# Patient Record
Sex: MALE | Race: WHITE | NOT HISPANIC OR LATINO | Employment: FULL TIME | URBAN - METROPOLITAN AREA
[De-identification: names, ages, dates, MRNs, and addresses within clinical notes are randomized per-mention and may not be internally consistent; named-entity substitution may affect disease eponyms.]

---

## 2017-04-14 ENCOUNTER — OFFICE VISIT (OUTPATIENT)
Dept: ENDOCRINOLOGY | Facility: CLINIC | Age: 57
End: 2017-04-14

## 2017-04-14 VITALS
WEIGHT: 188.3 LBS | HEIGHT: 69 IN | DIASTOLIC BLOOD PRESSURE: 76 MMHG | BODY MASS INDEX: 27.89 KG/M2 | HEART RATE: 84 BPM | SYSTOLIC BLOOD PRESSURE: 126 MMHG

## 2017-04-14 DIAGNOSIS — E11.9 TYPE 2 DIABETES MELLITUS WITHOUT COMPLICATION, WITHOUT LONG-TERM CURRENT USE OF INSULIN (H): ICD-10-CM

## 2017-04-14 DIAGNOSIS — M79.641 PAIN OF RIGHT HAND: Primary | ICD-10-CM

## 2017-04-14 LAB — HBA1C MFR BLD: 8.6 % (ref 4.3–6)

## 2017-04-14 NOTE — LETTER
"4/14/2017       RE: Chema Mccullough  [address] [facility] [address]     Dear Colleague,    Thank you for referring your patient, Chema Mccullough, to the [facility] ENDOCRINOLOGY at [facility]. Please see a copy of my visit note below.    #1 Diabetes.  States that he has been pre-diabetic for since 2004 He presented to his PCP who did routine blood work and found his fasting BS to be 184 with an A1c of 7.5.  This was started in the beginning of February 2009. On Metformin XR. In December 2011 it was 7.5.  In January 2013, it was 10.2. March 2013 is 9.3.  In March of 2013, I had the patient increase his metformin XR to 500 mg twice daily.June 2013 hemoglobin A1c of 8.3. January 2014 hemoglobin A1c of 8.2.  July 2014 hemoglobin A1c of 8.8.  With dietary and lifestyle changes, the patient's November 2014 hemoglobin A1c is 7.7.  April 2015 hemoglobin A1c of 8.7.  November 2015 hemoglobin A1c of 8.8.  November 2015 evaluation significant for detectable C-peptide at 3.2.  February 2016 hemoglobin A1c of 8.6.  June 2016 Hemoglobin A1c has remained stable at 8.6 so I increased the patient to metformin 2000 mg daily. August 2016 hemoglobin A1c of 8.8.  Patient now on metformin at 2000 mg daily as of July 2016.  November 2016 hemoglobin A1c of 7.6.      Review of systems related to diabetes  Cardiovascular: .  Currently on Lipitor.  November 2016 LDL was 57.  patient report, he had a stress test in 2016 that was \"clean\"  Ophthalmology: Eye exam negative in May 2015  Neuro: Patient denies  Nephrology: negative in November 2016  Infection: Patient denies  Immunizations: Patient reports flu shot in 2016  Dental: Patient reports up to date    Interval history since last visit:   November 2016 glucose was 194 with a C-peptide of 4.2.  April 2017 hemoglobin A1c of 8.6.    # 2 hyperlipidemia  On Lipitor 40 mg daily.  November 2015 LDL 75. stress test in 2016 that was " "unremarkable      Interval history since last visit: Patient continues on Lipitor    #3. Interest in weight loss   The patient has been working on lifestyle changes.    Interval history since last visit: The patient continues to exercise.  However he reports dietary indiscretion.  He has lost roughly half pound compared with his previous visit in November 2016.    #4 goiter  As noted during his evaluation in June 2016.  He was noted to have a multinodular goiter with several nodules roughly 1 cm in size. October 2016 formal neck ultrasound showed multinodular goiter, with  largest nodules on the right side at 1.1 cm in size (two nodules) and the largest nodule on the left side at 1.2 cm in size.  The patient had declined ultrasound-guided FNA in October 2016.    Interval history: Pt  denies any interim change.    Past medical history  #1 history of smoking-quit    Family history  Very strong family history of diabetes in mother, father, and sister    ROS:  Answers for HPI/ROS submitted by the patient on 4/14/2017   General Symptoms: No  Skin Symptoms: No  HENT Symptoms: No  EYE SYMPTOMS: No  HEART SYMPTOMS: No  LUNG SYMPTOMS: No  INTESTINAL SYMPTOMS: No  URINARY SYMPTOMS: No  REPRODUCTIVE SYMPTOMS: No  SKELETAL SYMPTOMS: No  BLOOD SYMPTOMS: No  NERVOUS SYSTEM SYMPTOMS: No  MENTAL HEALTH SYMPTOMS: No      EXAM:  Blood pressure 126/76, pulse 84, height 1.753 m (5' 9\"), weight 85.4 kg (188 lb 4.8 oz).     GENERAL APPEARANCE: Alert and no distress  NECK: No lymphadenopathy appreciated  Thyroid: Full thyroid appreciated, roughly 30 g.  Eval with floor ultrasound in April 2017 showed the following: Right anterior nodule 1.0 x 0.6 x 0.6 cm in size, right posterior/inferior nodule at 1.0 x 0.5 x 1.0 cm in size, left inferior nodule at 0.6 x 0.5 x 0.7 cm in size.  Comparison with formal ultrasound in 2016, these appear stable not slightly smaller.  CV: RRR without M/R/G  Lungs: CTA bilaterally  Abdomen: Soft, Nontender, non " distended, positive bowel sounds   Neuro:no focal deficits  Skin: No infection in feet   Mood: Normal   Lymph: neg in neck and supraclavicular area        Office Visit on 04/14/2017   Component Date Value Ref Range Status     Hemoglobin A1C 04/14/2017 8.6* 4.3 - 6.0 % Final         Assessment/Plan  #1 diabetes  His hemoglobin A1c has worsened to 8.6.  He continues on metformin at 2000 mg daily.  Given his November 2016 glucose of 194 and C-peptide of 4.2, I'm concerned that he may have a component of beta cell failure.  However, he does continue to make insulin as indicated by the positive C-peptide.      I discussed with him the possibility of Jardiance or another SGLT-2 inhibitor and he is reluctant to consider these options at this time.  He understands that when I see him again in three months, if his  hemoglobin A1c is not improved, we will need to consider these options.    #2 hyperlipidemia  Recheck lipid profile     #3 interest in weight loss  Pt to continue with dietary lifestyle changes.    #4 multinodular goiter  October 2016 neck ultrasound shows a multinodular goiter with the largest nodule roughly 1.1 cm on the right (two nodules at 1.1 cm)  and the largest nodule on the left side at 1.2 cm.  Patient had declined biopsy.    Repeat ultrasound today does not show any interval change in the nodule.  Will continue to observe for now.  We'll consider formal ultrasound maybe 1-2 years.    #5 right thumb pain  Suspect this is related to overuse.  However, this interferes with his ability to dress himself.  We'll have the patient take Naprosyn 375 mg daily and consider orthopedic evaluation.  He can also consider a wrist splint at the right hand at night.    Return visit planned in 4 months.    25 minutes spent with patient of which 20 minutes was spent in face-to-face discussion coordination of care    Again, thank you for allowing me to participate in the care of your patient.      Sincerely,    Cheri Urena  MD Walter

## 2017-04-14 NOTE — PROGRESS NOTES
"#1 Diabetes.  States that he has been pre-diabetic for since 2004 He presented to his PCP who did routine blood work and found his fasting BS to be 184 with an A1c of 7.5.  This was started in the beginning of February 2009. On Metformin XR. In December 2011 it was 7.5.  In January 2013, it was 10.2. March 2013 is 9.3.  In March of 2013, I had the patient increase his metformin XR to 500 mg twice daily.June 2013 hemoglobin A1c of 8.3. January 2014 hemoglobin A1c of 8.2.  July 2014 hemoglobin A1c of 8.8.  With dietary and lifestyle changes, the patient's November 2014 hemoglobin A1c is 7.7.  April 2015 hemoglobin A1c of 8.7.  November 2015 hemoglobin A1c of 8.8.  November 2015 evaluation significant for detectable C-peptide at 3.2.  February 2016 hemoglobin A1c of 8.6.  June 2016 Hemoglobin A1c has remained stable at 8.6 so I increased the patient to metformin 2000 mg daily. August 2016 hemoglobin A1c of 8.8.  Patient now on metformin at 2000 mg daily as of July 2016.  November 2016 hemoglobin A1c of 7.6.      Review of systems related to diabetes  Cardiovascular: .  Currently on Lipitor.  November 2016 LDL was 57.  patient report, he had a stress test in 2016 that was \"clean\"  Ophthalmology: Eye exam negative in May 2015  Neuro: Patient denies  Nephrology: negative in November 2016  Infection: Patient denies  Immunizations: Patient reports flu shot in 2016  Dental: Patient reports up to date    Interval history since last visit:   November 2016 glucose was 194 with a C-peptide of 4.2.  April 2017 hemoglobin A1c of 8.6.    # 2 hyperlipidemia  On Lipitor 40 mg daily.  November 2015 LDL 75. stress test in 2016 that was unremarkable      Interval history since last visit: Patient continues on Lipitor    #3. Interest in weight loss   The patient has been working on lifestyle changes.    Interval history since last visit: The patient continues to exercise.  However he reports dietary indiscretion.  He has lost roughly " "half pound compared with his previous visit in November 2016.    #4 goiter  As noted during his evaluation in June 2016.  He was noted to have a multinodular goiter with several nodules roughly 1 cm in size. October 2016 formal neck ultrasound showed multinodular goiter, with  largest nodules on the right side at 1.1 cm in size (two nodules) and the largest nodule on the left side at 1.2 cm in size.  The patient had declined ultrasound-guided FNA in October 2016.    Interval history: Pt  denies any interim change.    Past medical history  #1 history of smoking-quit    Family history  Very strong family history of diabetes in mother, father, and sister    ROS:  Answers for HPI/ROS submitted by the patient on 4/14/2017   General Symptoms: No  Skin Symptoms: No  HENT Symptoms: No  EYE SYMPTOMS: No  HEART SYMPTOMS: No  LUNG SYMPTOMS: No  INTESTINAL SYMPTOMS: No  URINARY SYMPTOMS: No  REPRODUCTIVE SYMPTOMS: No  SKELETAL SYMPTOMS: No  BLOOD SYMPTOMS: No  NERVOUS SYSTEM SYMPTOMS: No  MENTAL HEALTH SYMPTOMS: No      EXAM:  Blood pressure 126/76, pulse 84, height 1.753 m (5' 9\"), weight 85.4 kg (188 lb 4.8 oz).     GENERAL APPEARANCE: Alert and no distress  NECK: No lymphadenopathy appreciated  Thyroid: Full thyroid appreciated, roughly 30 g.  Eval with floor ultrasound in April 2017 showed the following: Right anterior nodule 1.0 x 0.6 x 0.6 cm in size, right posterior/inferior nodule at 1.0 x 0.5 x 1.0 cm in size, left inferior nodule at 0.6 x 0.5 x 0.7 cm in size.  Comparison with formal ultrasound in 2016, these appear stable not slightly smaller.  CV: RRR without M/R/G  Lungs: CTA bilaterally  Abdomen: Soft, Nontender, non distended, positive bowel sounds   Neuro:no focal deficits  Skin: No infection in feet   Mood: Normal   Lymph: neg in neck and supraclavicular area        Office Visit on 04/14/2017   Component Date Value Ref Range Status     Hemoglobin A1C 04/14/2017 8.6* 4.3 - 6.0 % Final         Assessment/Plan  #1 " diabetes  His hemoglobin A1c has worsened to 8.6.  He continues on metformin at 2000 mg daily.  Given his November 2016 glucose of 194 and C-peptide of 4.2, I'm concerned that he may have a component of beta cell failure.  However, he does continue to make insulin as indicated by the positive C-peptide.      I discussed with him the possibility of Jardiance or another SGLT-2 inhibitor and he is reluctant to consider these options at this time.  He understands that when I see him again in three months, if his  hemoglobin A1c is not improved, we will need to consider these options.    #2 hyperlipidemia  Recheck lipid profile     #3 interest in weight loss  Pt to continue with dietary lifestyle changes.    #4 multinodular goiter  October 2016 neck ultrasound shows a multinodular goiter with the largest nodule roughly 1.1 cm on the right (two nodules at 1.1 cm)  and the largest nodule on the left side at 1.2 cm.  Patient had declined biopsy.    Repeat ultrasound today does not show any interval change in the nodule.  Will continue to observe for now.  We'll consider formal ultrasound maybe 1-2 years.    #5 right thumb pain  Suspect this is related to overuse.  However, this interferes with his ability to dress himself.  We'll have the patient take Naprosyn 375 mg daily and consider orthopedic evaluation.  He can also consider a wrist splint at the right hand at night.    Return visit planned in 4 months.    25 minutes spent with patient of which 20 minutes was spent in face-to-face discussion coordination of care

## 2017-04-14 NOTE — MR AVS SNAPSHOT
After Visit Summary   4/14/2017    Chema Mccullough    MRN: [id number]           Patient Information     Date Of Birth          1960        Visit Information        Provider Department      4/14/2017 10:30 AM Cheri Watson MD [facility]        Today's Diagnoses     Pain of right hand    -  1      Care Instructions    Eat less carbs    Don't eat after  8 pm     Watch out for drinking the calories    Wrist splint to the right hand    Take naprosyn 375 in the evening can stop after 2 weeks then use as needed            Follow-ups after your visit        Additional Services     ORTHO  REFERRAL       Pt to see hand specialist for right hand pain                  Follow-up notes from your care team     Return in about 3 months (around 7/14/2017).      Your next 10 appointments already scheduled     May 09, 2017  1:00 PM CDT   (Arrive by 12:45 PM)   NEW HAND with Camila Baeza MD   [facility] ([facility])    [address] [place] [address]  [address] [facility] [address] [phone]            Jul 21, 2017  3:00 PM CDT   (Arrive by 2:45 PM)   RETURN DIABETES with Cheri Watson MD   [facility] ([facility])    [address] [place] [address]  [address] [facility] [address] [phone]              Who to contact     Please call your clinic at [phone] to:    Ask questions about your health    Make or cancel appointments    Discuss your medicines    Learn about your test results    Speak to your doctor   If you have compliments or concerns about an experience at your clinic, or if you wish to file a complaint, please contact [facility] Patient Relations at [phone] or email us at [email or web address]         Additional Information About Your Visit        MyChart Information     [email or web address] gives you secure access to your electronic health record. If you see  "a primary care provider, you can also send messages to your care team and make appointments. If you have questions, please call your primary care clinic.  If you do not have a primary care provider, please call [phone] and they will assist you.      [employer] is an electronic gateway that provides easy, online access to your medical records. With [employer], you can request a clinic appointment, read your test results, renew a prescription or communicate with your care team.     To access your existing account, please contact your [facility] or call [phone] for assistance.        Care EveryWhere ID     This is your Care EveryWhere ID. This could be used by other organizations to access your [facility] medical records  [id number]        Your Vitals Were     Pulse Height BMI (Body Mass Index)             84 1.753 m (5' 9\") 27.81 kg/m2          Blood Pressure from Last 3 Encounters:   04/14/17 126/76   11/18/16 109/72   08/19/16 122/67    Weight from Last 3 Encounters:   04/14/17 85.4 kg (188 lb 4.8 oz)   11/18/16 85.7 kg (189 lb)   08/19/16 87.1 kg (192 lb)              We Performed the Following     ORTHO  REFERRAL          Today's Medication Changes          These changes are accurate as of: 4/14/17 11:33 AM.  If you have any questions, ask your nurse or doctor.               Start taking these medicines.        Dose/Directions    naproxen 375 MG tablet   Commonly known as:  NAPROSYN   Used for:  Pain of right hand        1 tablet daily with supper   Quantity:  30 tablet   Refills:  0            Where to get your medicines      These medications were sent to [facility] [address] [place] [facility] [id number] [place] [address]  [id number] [place] [address]     Phone:  [phone]     naproxen 375 MG tablet                Primary Care Provider Office Phone # Fax #    Sharla Wu [phone]       [address]  [facility] [address]        Thank you!     Thank you for choosing [facility] ENDOCRINOLOGY  for your care. Our goal is always to provide you with excellent care. Hearing back from our patients is one way we can continue to improve our services. Please take a few minutes to complete the written survey that you may receive in the mail after your visit with us. Thank you!             Your Updated Medication List - Protect others around you: Learn how to safely use, store and throw away your medicines at www.disposemymeds.org.          This list is accurate as of: 4/14/17 11:33 AM.  Always use your most recent med list.                   Brand Name Dispense Instructions for use    aspirin 81 MG tablet     90 tablet    Take 1 tablet (81 mg) by mouth daily       atorvastatin 40 MG tablet    LIPITOR    90 tablet    Take 1 tablet (40 mg) by mouth daily Take one daily       metFORMIN 1000 MG tablet    GLUCOPHAGE    180 tablet    Take 1 tablet (1,000 mg) by mouth 2 times daily (with meals)       naproxen 375 MG tablet    NAPROSYN    30 tablet    1 tablet daily with supper       OMEGA 3 PO      Take by mouth daily

## 2017-04-14 NOTE — PATIENT INSTRUCTIONS
Eat less carbs    Don't eat after  8 pm     Watch out for drinking the calories    Wrist splint to the right hand    Take naprosyn 375 in the evening can stop after 2 weeks then use as needed

## 2017-04-18 ENCOUNTER — PRE VISIT (OUTPATIENT)
Dept: ORTHOPEDICS | Facility: CLINIC | Age: 57
End: 2017-04-18

## 2017-04-18 NOTE — TELEPHONE ENCOUNTER
1.  Date/reason for appt: 5/9/17 - Rt Hand Pain  2.  Referring provider: Dr. Cheri Watson  3.  Call to patient (Yes / No - short description): no, pt is referred  4.  Previous care at / records requested from: [facility] -- Records and referral in Epic

## 2017-05-02 DIAGNOSIS — M79.641 PAIN OF RIGHT HAND: Primary | ICD-10-CM

## 2017-05-09 ENCOUNTER — OFFICE VISIT (OUTPATIENT)
Dept: ORTHOPEDICS | Facility: CLINIC | Age: 57
End: 2017-05-09

## 2017-05-09 ENCOUNTER — THERAPY VISIT (OUTPATIENT)
Dept: OCCUPATIONAL THERAPY | Facility: CLINIC | Age: 57
End: 2017-05-09
Payer: COMMERCIAL

## 2017-05-09 VITALS — BODY MASS INDEX: 28.29 KG/M2 | WEIGHT: 191 LBS | HEIGHT: 69 IN

## 2017-05-09 DIAGNOSIS — M65.4 RADIAL STYLOID TENOSYNOVITIS: Primary | ICD-10-CM

## 2017-05-09 PROCEDURE — 29130 APPL FINGER SPLINT STATIC: CPT | Mod: GO | Performed by: OCCUPATIONAL THERAPIST

## 2017-05-09 PROCEDURE — 97165 OT EVAL LOW COMPLEX 30 MIN: CPT | Mod: GO | Performed by: OCCUPATIONAL THERAPIST

## 2017-05-09 NOTE — Clinical Note
5/9/2017       RE: Chema Mccullough  [address] [facility] [address]     Dear Colleague,    Thank you for referring your patient, Chema Mccullough, to the [facility] ORTHOPAEDIC CLINIC at [facility]. Please see a copy of my visit note below.    No notes on file    Again, thank you for allowing me to participate in the care of your patient.      Sincerely,    Camila Baeza MD

## 2017-05-09 NOTE — PROGRESS NOTES
Hand Therapy Initial Evaluation    Current Date:  5/9/2017    Diagnosis: R deQervain's Tenosynovitis  DOI:  October '16  Procedure:  none    Precautions: NA    Subjective:  Chema Mccullough is a 57 year old right hand dominant male.    Patient reports symptoms of pain of the right wrist and thumb which occurred due to not sure. Since onset symptoms are improved after injection  Special tests:  x-ray.  Previous treatment: cortisone injection.    General health as reported by patient is good.  Pertinent medical history includes:none  Medical allergies:none.  Surgical history: none.  Medication history: none.    Current occupation is Electric    Currently working in normal job without restrictions  Job Tasks: prolonged sitting, computer work  Barriers include:none  Prior functional level:  no limitations    Additional Occupational Profile Information (patterns of daily living, interests, values and needs): biking    Objective:  Pain Level Report  VAS(0-10) 5/9/2017   At Rest: 3/10   With Use: 7/10     Report of Pain:  Location:  wrist and thumb (radial side)  Pain Quality:  Burning, Dull and Sharp  Frequency: constant    Pain is worst:  daytime  Exacerbated by:  movement  Relieved by:  rest  Progression:  Improved after injection  ROM:  Pain Report:  - none    + mild    ++ moderate    +++ severe    5/9/2017   Thumb AROM  PROM rigt    MP -15/64    IP +/37    Rabd 70++    Pabd 67++   Wrist Ext 65    Flex 66    RD 10    UD 30    Sup 67+    Pro 90     Pain level report on scale of 0-10/10  Resisted Testing 5/9/2017   APL *   EPB *   RD *   UD *   Wrist Ext *   Wrist Flex *   *NOT TESTED, SECONDARY TO INJECTION TODAY  Strength:  Contraindicated, due to injection    Pain Report:  - none    + mild    ++ moderate    +++ severe   Tenderness 5/9/2017   Radial Styloid    SBRN      Special Tests 5/9/2017   Finkelsteins *   Radial Nerve Tinel's *   *NOT TESTED, SECONDARY TO INJECTION  TODAY    Assessment:  Patient presents with symptoms consistent with diagnosis of DeQuervains tenosynovitis, with conservative intervention.   Patient's limitations or Problem List includes:  Pain, Decreased ROM/motion, Weakness, Decreased  and pinch strength and tightness in musculature of the right wrist which interferes with the patient's ability to perform Self Care Tasks (dressing, eating, bathing, hygiene/toileting), Work Tasks, Sleep Patterns, Recreational Activities, Household Chores and Driving  as compared to previous level of function.    Rehab Potential:  Excellent - Return to full activity, no limitations    Patient will benefit from skilled Occupational Therapy to increase wrist and thumb ROM, flexibility,  strength and pinch strength and decrease pain to return to previous activity level and resume normal daily tasks and to reach their rehab potential.    Barriers to Learning:  No barrier    Communication Issues:  Patient appears to be able to clearly communicate and understand verbal and written communication and follow directions correctly.    Assessment of Occupational Performance:  1-3 Performance Deficits  Identified Performance Deficits: work and leisure activities      Clinical Decision Making (Complexity): Low complexity    Treatment Explanation:  The following has been discussed with the patient:    RX ordered/plan of care  Anticipated outcomes  Possible risks and side effects    Treatment Plan:    Frequency:  2 X a month, once daily  Duration:  for 2 months    Therapeutic Exercise:   AROM of wrist and thumb  PROM with stretch to wrist and thumb extensors   Strengthening  Manual Techniques:   Joint mobs  Friction massage over 1st dorsal compartment  MFR  Neuromuscular Reeducation   Nerve Gliding and Kinesiotaping  Orthotic Fabrication:  Forearm based thumb spica orthosis  Activity:   Avoid activities that exacerbate pain in the wrist or thumb.    Avoid  with twist, wide  grasp.    Discharge Plan:    Achieve all LTG.  Independent in home treatment program.  Reach maximal therapeutic benefit.    Home Exercise Program:  [place] orthosis, prn  Limiting radial and ulnar deviation    Next Visit:  PRN

## 2017-05-09 NOTE — MR AVS SNAPSHOT
After Visit Summary   5/9/2017    Chema Mccullough    MRN: [id number]           Patient Information     Date Of Birth          1960        Visit Information        Provider Department      5/9/2017 1:00 PM Camila Baeza MD [facility]        Today's Diagnoses     Radial styloid tenosynovitis    -  1       Follow-ups after your visit        Additional Services     HAND THERAPY       Hand Therapy Referral                  Your next 10 appointments already scheduled     Aug 11, 2017 11:30 AM CDT   (Arrive by 11:15 AM)   RETURN DIABETES with Cheri Watson MD   [facility] ([facility])    [address] [place] [id number] [phone]              Who to contact     Please call your clinic at [phone] to:    Ask questions about your health    Make or cancel appointments    Discuss your medicines    Learn about your test results    Speak to your doctor   If you have compliments or concerns about an experience at your clinic, or if you wish to file a complaint, please contact [facility] Patient Relations at [phone] or email us at [email or web address]         Additional Information About Your Visit        MyChart Information     [employer] gives you secure access to your electronic health record. If you see a primary care provider, you can also send messages to your care team and make appointments. If you have questions, please call your primary care clinic.  If you do not have a primary care provider, please call [phone] and they will assist you.      [employer] is an electronic gateway that provides easy, online access to your medical records. With [employer], you can request a clinic appointment, read your test results, renew a prescription or communicate with your care team.     To access your existing account, please contact your [facility]  "Clinic or call [phone] for assistance.        Care EveryWhere ID     This is your Care EveryWhere ID. This could be used by other organizations to access your [place] medical records  [id number]        Your Vitals Were     Height BMI (Body Mass Index)                1.753 m (5' 9\") 28.21 kg/m2           Blood Pressure from Last 3 Encounters:   04/14/17 126/76   11/18/16 109/72   08/19/16 122/67    Weight from Last 3 Encounters:   05/09/17 86.6 kg (191 lb)   04/14/17 85.4 kg (188 lb 4.8 oz)   11/18/16 85.7 kg (189 lb)              We Performed the Following     HAND THERAPY     INJECTION SINGLE TENDON SHEATH/LIGAMENT        Primary Care Provider Office Phone # Fax #    Sharla Wu [phone]       [place] [address]        Equal Access to Services     [facility] : Hadii aad ku hadasho Soomaali, waaxda luqadaha, qaybta kaalmada adeegyada, waxay idiin hayreymundon kofi mckenzie . So [facility] [phone].    ATENCIÓN: Si josh temple, tiene a castro disposición servicios gratuitos de asistencia lingüística. Llame al [phone].    We comply with applicable federal civil rights laws and [place] laws. We do not discriminate on the basis of race, color, national origin, age, disability sex, sexual orientation or gender identity.            Thank you!     Thank you for choosing [facility]  for your care. Our goal is always to provide you with excellent care. Hearing back from our patients is one way we can continue to improve our services. Please take a few minutes to complete the written survey that you may receive in the mail after your visit with us. Thank you!             Your Updated Medication List - Protect others around you: Learn how to safely use, store and throw away your medicines at www.disposemymeds.org.          This list is accurate as of: 5/9/17 11:59 PM.  Always use your most recent med list.          "          Brand Name Dispense Instructions for use Diagnosis    aspirin 81 MG tablet     90 tablet    Take 1 tablet (81 mg) by mouth daily    Type 2 diabetes mellitus without complication, without long-term current use of insulin (H)       atorvastatin 40 MG tablet    LIPITOR    90 tablet    Take 1 tablet (40 mg) by mouth daily Take one daily    Type 2 diabetes mellitus without complication, without long-term current use of insulin (H)       metFORMIN 1000 MG tablet    GLUCOPHAGE    180 tablet    Take 1 tablet (1,000 mg) by mouth 2 times daily (with meals)    Type 2 diabetes mellitus without complication, without long-term current use of insulin (H)       naproxen 375 MG tablet    NAPROSYN    30 tablet    1 tablet daily with supper    Pain of right hand       OMEGA 3 PO      Take by mouth daily

## 2017-05-09 NOTE — NURSING NOTE
[facility]  [address] [place] [id number]  Dept: [phone]  ______________________________________________________________________________    Patient: Chema Mccullough   : 1960   MRN: [id number]   May 9, 2017    INVASIVE PROCEDURE SAFETY CHECKLIST    Date: 2017   Procedure: Right first dorsal compartment steroid injection  Patient Name: Chema Mccullough  MRN: [id number]  YOB: 1960    Action: Complete sections as appropriate. Any discrepancy results in a HARD COPY until resolved.     PRE PROCEDURE:  Patient ID verified with 2 identifiers (name and  or MRN): Yes  Procedure and site verified with patient/designee (when able): Yes  Accurate consent documentation in medical record: Yes  H&P (or appropriate assessment) documented in medical record: Yes  H&P must be up to 20 days prior to procedure and updates within 24 hours of procedure as applicable: Yes  Relevant diagnostic and radiology test results appropriately labeled and displayed as applicable: Yes  Procedure site(s) marked with provider initials: Yes    TIMEOUT:  Time-Out performed immediately prior to starting procedure, including verbal and active participation of all team members addressing the following:Yes  * Correct patient identify  * Confirmed that the correct side and site are marked  * An accurate procedure consent form  * Agreement on the procedure to be done  * Correct patient position  * Relevant images and results are properly labeled and appropriately displayed  * The need to administer antibiotics or fluids for irrigation purposes during the procedure as applicable   * Safety precautions based on patient history or medication use    DURING PROCEDURE: Verification of correct person, site, and procedures any time the responsibility for care of the patient is transferred to another member of the care team.

## 2017-05-09 NOTE — MR AVS SNAPSHOT
After Visit Summary   5/9/2017    Chema Mccullough    MRN: [id number]           Patient Information     Date Of Birth          1960        Visit Information        Provider Department      5/9/2017 2:00 PM Lola Patel OT [facility] Hand Therapy        Today's Diagnoses     Radial styloid tenosynovitis    -  1       Follow-ups after your visit        Your next 10 appointments already scheduled     Aug 11, 2017 11:30 AM CDT   (Arrive by 11:15 AM)   RETURN DIABETES with Cheri Watson MD   [facility] ([facility])    [address] [place] [id number] [phone]              Who to contact     If you have questions or need follow up information about today's clinic visit or your schedule please contact [facility] HAND [facility] directly at [phone].  Normal or non-critical lab and imaging results will be communicated to you by MyChart, letter or phone within 4 business days after the clinic has received the results. If you do not hear from us within 7 days, please contact the clinic through [email or web address] or phone. If you have a critical or abnormal lab result, we will notify you by phone as soon as possible.  Submit refill requests through [employer] or call your pharmacy and they will forward the refill request to us. Please allow 3 business days for your refill to be completed.          Additional Information About Your Visit        MyChart Information     [employer] gives you secure access to your electronic health record. If you see a primary care provider, you can also send messages to your care team and make appointments. If you have questions, please call your primary care clinic.  If you do not have a primary care provider, please call [phone] and they will assist you.        Care EveryWhere ID     This is your Care EveryWhere ID. This could be used by other organizations to access your [facility]  records  [id number]         Blood Pressure from Last 3 Encounters:   04/14/17 126/76   11/18/16 109/72   08/19/16 122/67    Weight from Last 3 Encounters:   05/09/17 86.6 kg (191 lb)   04/14/17 85.4 kg (188 lb 4.8 oz)   11/18/16 85.7 kg (189 lb)              We Performed the Following     APPLY FINGER SPLINT STATIC     HC OT EVAL, LOW COMPLEXITY        Primary Care Provider Office Phone # Fax #    Sharla Wu [phone]       [place] [address]        Thank you!     Thank you for choosing [facility]  for your care. Our goal is always to provide you with excellent care. Hearing back from our patients is one way we can continue to improve our services. Please take a few minutes to complete the written survey that you may receive in the mail after your visit with us. Thank you!             Your Updated Medication List - Protect others around you: Learn how to safely use, store and throw away your medicines at www.disposemymeds.org.          This list is accurate as of: 5/9/17  8:07 PM.  Always use your most recent med list.                   Brand Name Dispense Instructions for use    aspirin 81 MG tablet     90 tablet    Take 1 tablet (81 mg) by mouth daily       atorvastatin 40 MG tablet    LIPITOR    90 tablet    Take 1 tablet (40 mg) by mouth daily Take one daily       metFORMIN 1000 MG tablet    GLUCOPHAGE    180 tablet    Take 1 tablet (1,000 mg) by mouth 2 times daily (with meals)       naproxen 375 MG tablet    NAPROSYN    30 tablet    1 tablet daily with supper       OMEGA 3 PO      Take by mouth daily

## 2017-05-09 NOTE — PROGRESS NOTES
[facility], Orthopaedic Hand Surgery    Chema Mccullough MRN# [id number]   Age: 57 year old YOB: 1960     Requesting physician: Cheri Watson   Primary care physician: Sharla Wu             History of Present Illness:   Chema Mccullough is a 57 year old year old male who presents today for evaluation and management of R base of thumb pain.     Pain Assessment  Patient Currently in Pain: Yes  0-10 Pain Scale: 3  Primary Pain Location: Finger (Comment which one)  Pain Orientation: Right  Pain Descriptors: Discomfort, Sore  Alleviating Factors: Rest  Aggravating Factors: Movement    Hand Dominance Evaluation  Hand Dominance: Right     The pain localizes to the R thumb base, moreso over 1st dorsal compartment.    The pain has been present for approximately 6mo.    There was not inciting event or trauma.    The pain has been Worsening over the more recent history.  Exacerbated by buttons and self cares. Operating mouse and computer worsen at the end of the day.    Thus far, the patient has tried NSAIDs and offloading brace.  With min relief of symptoms.             Past Medical History:     Patient Active Problem List   Diagnosis     Diabetes mellitus (H)     Goiter     Pain of right hand     Past Medical History:   Diagnosis Date     Diabetes (H)      Prior history of blood clot: none  Prior history of bleeding problems: none  Prior history of anesthetic complications: none  Currently on opioids:  none  History of Diabetes: type 2 DM, no insulin           Past Surgical History:   No past surgical history on file.         Social History:     Social History     Social History     Marital status:      Spouse name: N/A     Number of children: N/A     Years of education: N/A     Occupational History     Not on file.     Social History Main Topics     Smoking status: Former Smoker     Packs/day: 1.00     Types: Cigarettes, Cigars     Start date: 10/10/1978     Quit date: 1/1/2011  "    Smokeless tobacco: Former User     Alcohol use No     Drug use: No     Sexual activity: Yes     Partners: Female     Other Topics Concern     Not on file     Social History Narrative     Professor of electrical engineering  The patient lives locally with wife.         Family History:       Family History   Problem Relation Age of Onset     DIABETES Mother      DIABETES Father      CANCER No family hx of      no skin cancer     Glaucoma No family hx of      Macular Degeneration No family hx of               Medications:     Current Outpatient Prescriptions   Medication Sig     Omega-3 Fatty Acids (OMEGA 3 PO) Take by mouth daily     naproxen (NAPROSYN) 375 MG tablet 1 tablet daily with supper     metFORMIN (GLUCOPHAGE) 1000 MG tablet Take 1 tablet (1,000 mg) by mouth 2 times daily (with meals)     aspirin 81 MG tablet Take 1 tablet (81 mg) by mouth daily     atorvastatin (LIPITOR) 40 MG tablet Take 1 tablet (40 mg) by mouth daily Take one daily     No current facility-administered medications for this visit.             Review of Systems:   A comprehensive 10 point review of systems (constitutional, ENT, cardiac, peripheral vascular, lymphatic, respiratory, GI, , Musculoskeletal, skin, Neurological) was performed and documented in the intake form and at the end of this note      Answers for HPI/ROS submitted by the patient on 5/9/2017   General Symptoms: No  Skin Symptoms: No  HENT Symptoms: No  EYE SYMPTOMS: No  HEART SYMPTOMS: No  LUNG SYMPTOMS: No  INTESTINAL SYMPTOMS: No  URINARY SYMPTOMS: No  REPRODUCTIVE SYMPTOMS: No  SKELETAL SYMPTOMS: No  BLOOD SYMPTOMS: No  NERVOUS SYSTEM SYMPTOMS: No  MENTAL HEALTH SYMPTOMS: No             Physical Exam:     EXAMINATION pertinent findings:   VITAL SIGNS: Height 1.753 m (5' 9\"), weight 86.6 kg (191 lb).  Body mass index is 28.21 kg/(m^2).  GEN: AOx3, appears stated age, cooperative, no distress  HEENT: normocephalic, atraumatic, extraocular movements intact  RESP: non " labored breathing   SKIN: No rashes or open lesions   CV: Non-tachycardic   NEURO: CN2-12 grossly intact   VASCULAR: palpable radial pulse   MUSCULOSKELETAL:       RUE exam:   Tenderness to palpation: 1st dorsal compartment  Warmth and erythema: no  Prior incision: no  Sensation: intact in Med/uln/rad distributions  Motor: Fires EPL/FPL/AIN/IO  Deformity: no  Able to sublux CMC without pain.   + finkelstiens on R         Data:   Imaging:     XR of 3v R hand reviewed and the pertinent findings are as follows:   No degenerative change at CMC notable.     Pertinent Labs:      force  R hand pincher force: 5.443 kg (12 lb)  R hand  level 1 force: 29.5 kg (65 lb)  R hand  level 3 force: 45.4 kg (100 lb)  R hand  level 5 force: 36.3 kg (80 lb)  L hand pincher force: 6.804 kg (15 lb)  L hand   level 1 force: 34 kg (75 lb)  L hand  level 3 force: 45.4 kg (100 lb)  L hand  level 5 force: 36.3 kg (80 lb)      Lab Results   Component Value Date    A1C 8.3 06/07/2013    A1C 7.5 12/23/2011       QuickDASH Assessment  QuickDASH Main 5/9/2017   1.Open a tight or new jar. Moderate difficulty   2. Do heavy household chores (e.g., wash walls, floors) No difficulty   3. Carry a shopping bag or briefcase. No difficulty   4. Wash your back. Severe difficulty   5. Use a knife to cut food. Mild difficulty   6. Recreational activities in which you take some force or impact through your arm, shoulder or hand (e.g., golf, hammering, tennis, etc.). Mild difficulty   7. During the past week, to what extent has your arm, shoulder or hand problem interfered with your normal social activities with family, friends, neighbours or groups? Slightly   8. During the past week, were you limited in your work or other regular daily activities as a result of your arm, shoulder or hand problem? Slightly limited   9. Arm, shoulder or hand pain. Moderate   10.Tingling (pins and needles) in your arm,shoulder or hand. None   11.  During the past week, how much difficulty have you had sleeping because of the pain in your arm, shoulder or hand? ([place] number) Mild difficulty   Quickdash Ability Score 27.27               Assessment and Plan:   Assessment:  1. R [place] tenosynovitis     Plan:  1. R 1st dorsal compartment CSI  2. Thumb spica splint, hand therapy ordered    RTC PRN    After written informed consent was obtained, the patient's right 1st dorsal compartment was prepped with chloraprep.  A combination of 20 mg of Depo-Medrol and 1cc 1% lidocaine were injected into the 1st dorsal compartment through a retrograde approach.  There were no immediate complications.  Band-aid was applied.      The patient plan of care was formulated along with Dr. Felisha Gasca MD  Orthopedic Resident  05/09/2017    I have independently seen and evaluated the patient and agree with the findings and plan of care as documented by the resident and edited by me.

## 2017-05-09 NOTE — NURSING NOTE
"Reason For Visit:   Chief Complaint   Patient presents with     Consult     Right hand thumb pain. Started in october/ November and heard cracking in joint. Referred by Dr. Watson in Endocrinology       Primary MD: Sharla Wu  Ref. MD: Dr. Watson    Age: 57 year old    ?  No      Ht 1.753 m (5' 9\")  Wt 86.6 kg (191 lb)  BMI 28.21 kg/m2      Pain Assessment  Patient Currently in Pain: Yes  0-10 Pain Scale: 3  Primary Pain Location: Finger (Comment which one)  Pain Orientation: Right  Pain Descriptors: Discomfort, Sore  Alleviating Factors: Rest  Aggravating Factors: Movement    Hand Dominance Evaluation  Hand Dominance: Right      force  R hand pincher force: 5.443 kg (12 lb)  R hand  level 1 force: 29.5 kg (65 lb)  R hand  level 3 force: 45.4 kg (100 lb)  R hand  level 5 force: 36.3 kg (80 lb)  L hand pincher force: 6.804 kg (15 lb)  L hand   level 1 force: 34 kg (75 lb)  L hand  level 3 force: 45.4 kg (100 lb)  L hand  level 5 force: 36.3 kg (80 lb)    QuickDASH Assessment  QuickDASH Main 5/9/2017   1.Open a tight or new jar. Moderate difficulty   2. Do heavy household chores (e.g., wash walls, floors) No difficulty   3. Carry a shopping bag or briefcase. No difficulty   4. Wash your back. Severe difficulty   5. Use a knife to cut food. Mild difficulty   6. Recreational activities in which you take some force or impact through your arm, shoulder or hand (e.g., golf, hammering, tennis, etc.). Mild difficulty   7. During the past week, to what extent has your arm, shoulder or hand problem interfered with your normal social activities with family, friends, neighbours or groups? Slightly   8. During the past week, were you limited in your work or other regular daily activities as a result of your arm, shoulder or hand problem? Slightly limited   9. Arm, shoulder or hand pain. Moderate   10.Tingling (pins and needles) in your arm,shoulder or hand. None   11. During the " past week, how much difficulty have you had sleeping because of the pain in your arm, shoulder or hand? ([place] number) Mild difficulty   Quickdash Ability Score 27.27          Current Outpatient Prescriptions   Medication Sig Dispense Refill     Omega-3 Fatty Acids (OMEGA 3 PO) Take by mouth daily       naproxen (NAPROSYN) 375 MG tablet 1 tablet daily with supper 30 tablet 0     metFORMIN (GLUCOPHAGE) 1000 MG tablet Take 1 tablet (1,000 mg) by mouth 2 times daily (with meals) 180 tablet 3     aspirin 81 MG tablet Take 1 tablet (81 mg) by mouth daily 90 tablet 3     atorvastatin (LIPITOR) 40 MG tablet Take 1 tablet (40 mg) by mouth daily Take one daily 90 tablet 3       No Known Allergies

## 2017-06-21 RX ORDER — METHYLPREDNISOLONE ACETATE 40 MG/ML
40 INJECTION, SUSPENSION INTRA-ARTICULAR; INTRALESIONAL; INTRAMUSCULAR; SOFT TISSUE ONCE
Status: DISCONTINUED | OUTPATIENT
Start: 2017-06-21 | End: 2019-08-16

## 2017-06-21 RX ORDER — LIDOCAINE HYDROCHLORIDE 10 MG/ML
2 INJECTION, SOLUTION INFILTRATION; PERINEURAL ONCE
Status: ACTIVE | OUTPATIENT
Start: 2017-06-21

## 2017-08-11 ENCOUNTER — OFFICE VISIT (OUTPATIENT)
Dept: ENDOCRINOLOGY | Facility: CLINIC | Age: 57
End: 2017-08-11

## 2017-08-11 VITALS
HEIGHT: 69 IN | WEIGHT: 191.2 LBS | SYSTOLIC BLOOD PRESSURE: 110 MMHG | DIASTOLIC BLOOD PRESSURE: 73 MMHG | BODY MASS INDEX: 28.32 KG/M2

## 2017-08-11 DIAGNOSIS — E11.9 TYPE 2 DIABETES MELLITUS WITHOUT COMPLICATION, WITHOUT LONG-TERM CURRENT USE OF INSULIN (H): Primary | ICD-10-CM

## 2017-08-11 LAB — HBA1C MFR BLD: 8.6 % (ref 4.3–6)

## 2017-08-11 ASSESSMENT — PAIN SCALES - GENERAL: PAINLEVEL: NO PAIN (0)

## 2017-08-11 NOTE — MR AVS SNAPSHOT
After Visit Summary   8/11/2017    Chema Mccullough    MRN: [id number]           Patient Information     Date Of Birth          1960        Visit Information        Provider Department      8/11/2017 11:30 AM Cheri Watson MD [facility]        Care Instructions    Try concept 2 with goal of about 600 calories/hr - more leg than arm - make sure technique is ok          Follow-ups after your visit        Follow-up notes from your care team     Return in about 3 months (around 11/11/2017).      Your next 10 appointments already scheduled     Aug 23, 2017 10:00 AM CDT   Nurse Visit with  Med Nurse   [facility] ([facility])    [address]  3rd [address]  [facility] [address] [phone]            Dec 01, 2017 10:00 AM CST   (Arrive by 9:45 AM)   RETURN DIABETES with Cheri Watson MD   [facility] ([facility])    [address]  [address] [facility] [address] [phone]              Who to contact     Please call your clinic at [phone] to:    Ask questions about your health    Make or cancel appointments    Discuss your medicines    Learn about your test results    Speak to your doctor   If you have compliments or concerns about an experience at your clinic, or if you wish to file a complaint, please contact [facility] Patient Relations at [phone] or email us at [email or web address]         Additional Information About Your Visit        [employer] Information     [employer] gives you secure access to your electronic health record. If you see a primary care provider, you can also send messages to your care team and make appointments. If you have questions, please call your primary care clinic.  If you do not have a primary care provider, please call [phone] and they will assist you.      GoodPeople is an electronic gateway that provides  "easy, online access to your medical records. With ithinksport, you can request a clinic appointment, read your test results, renew a prescription or communicate with your care team.     To access your existing account, please contact your [facility] or call [phone] for assistance.        Care EveryWhere ID     This is your Care EveryWhere ID. This could be used by other organizations to access your [place] medical records  [id number]        Your Vitals Were     Height BMI (Body Mass Index)                1.753 m (5' 9\") 28.24 kg/m2           Blood Pressure from Last 3 Encounters:   08/11/17 110/73   04/14/17 126/76   11/18/16 109/72    Weight from Last 3 Encounters:   08/11/17 86.7 kg (191 lb 3.2 oz)   05/09/17 86.6 kg (191 lb)   04/14/17 85.4 kg (188 lb 4.8 oz)              Today, you had the following     No orders found for display         Today's Medication Changes          These changes are accurate as of: 8/11/17 12:33 PM.  If you have any questions, ask your nurse or doctor.               Stop taking these medicines if you haven't already. Please contact your care team if you have questions.     naproxen 375 MG tablet   Commonly known as:  NAPROSYN                    Primary Care Provider Office Phone # Fax #    Sharla Wu [phone]       [place] [address]        Equal Access to Services     ADRIENNE RESTREPO : Hadii binu freemano Sorod, waaxda luqadaha, qaybta kaalmada nupur, danny thomas. So [facility] [phone].    ATENCIÓN: Si habla español, tiene a castro disposición servicios gratuitos de asistencia lingüística. Llame al [phone].    We comply with applicable federal civil rights laws and [place] laws. We do not discriminate on the basis of race, color, national origin, age, disability sex, sexual orientation or gender identity.            Thank you!     " Thank you for choosing [facility] ENDOCRINOLOGY  for your care. Our goal is always to provide you with excellent care. Hearing back from our patients is one way we can continue to improve our services. Please take a few minutes to complete the written survey that you may receive in the mail after your visit with us. Thank you!             Your Updated Medication List - Protect others around you: Learn how to safely use, store and throw away your medicines at www.disposemymeds.org.          This list is accurate as of: 8/11/17 12:33 PM.  Always use your most recent med list.                   Brand Name Dispense Instructions for use Diagnosis    aspirin 81 MG tablet     90 tablet    Take 1 tablet (81 mg) by mouth daily    Type 2 diabetes mellitus without complication, without long-term current use of insulin (H)       atorvastatin 40 MG tablet    LIPITOR    90 tablet    Take 1 tablet (40 mg) by mouth daily Take one daily    Type 2 diabetes mellitus without complication, without long-term current use of insulin (H)       metFORMIN 1000 MG tablet    GLUCOPHAGE    180 tablet    Take 1 tablet (1,000 mg) by mouth 2 times daily (with meals)    Type 2 diabetes mellitus without complication, without long-term current use of insulin (H)       OMEGA 3 PO      Take by mouth daily

## 2017-08-11 NOTE — PROGRESS NOTES
"#1 Diabetes.  States that he has been pre-diabetic for since 2004 He presented to his PCP who did routine blood work and found his fasting BS to be 184 with an A1c of 7.5.  This was started in the beginning of February 2009. On Metformin XR. In December 2011 it was 7.5.  In January 2013, it was 10.2. March 2013 is 9.3.  In March of 2013, I had the patient increase his metformin XR to 500 mg twice daily.June 2013 hemoglobin A1c of 8.3. January 2014 hemoglobin A1c of 8.2.  July 2014 hemoglobin A1c of 8.8.  With dietary and lifestyle changes, the patient's November 2014 hemoglobin A1c is 7.7.  April 2015 hemoglobin A1c of 8.7.  November 2015 hemoglobin A1c of 8.8.  November 2015 evaluation significant for detectable C-peptide at 3.2.  February 2016 hemoglobin A1c of 8.6.  June 2016 Hemoglobin A1c has remained stable at 8.6 so I increased the patient to metformin 2000 mg daily. August 2016 hemoglobin A1c of 8.8.  Patient now on metformin at 2000 mg daily as of July 2016.  November 2016 hemoglobin A1c of 7.6. November 2016 glucose was 194 with a C-peptide of 4.2.  April 2017 hemoglobin A1c of 8.6.    Review of systems related to diabetes  Cardiovascular: .  Currently on Lipitor.  November 2016 LDL was 57.  patient report, he had a stress test in 2016 that was \"clean\"  Ophthalmology: Eye exam negative in May 2015  Neuro: Patient denies  Nephrology: negative in November 2016  Infection: Patient denies  Immunizations: Patient reports flu shot in 2016  Dental: Patient reports up to date    Interval history since last visit:   August 2017 hemoglobin A1c 8.6. The patient reports his weight is stable.  Continues to have some dietary indiscretion.  Continues on metformin 2000 mg daily.    # 2 hyperlipidemia  On Lipitor 40 mg daily.  November 2015 LDL 75. stress test in 2016 that was unremarkable      Interval history since last visit: Patient continues on Lipitor. November 2016 LDL was 57.    #3. Interest in weight loss   The " "patient has been working on lifestyle changes.    Interval history since last visit: The patient has now bought at  rowing machine and is exercising.    #4 goiter  As noted during his evaluation in June 2016.  He was noted to have a multinodular goiter with several nodules roughly 1 cm in size. October 2016 formal neck ultrasound showed multinodular goiter, with  largest nodules on the right side at 1.1 cm in size (two nodules) and the largest nodule on the left side at 1.2 cm in size.  The patient had declined ultrasound-guided FNA in October 2016.    Interval history: Pt  denies any interim change. Eval with floor ultrasound in April 2017 showed the following: Right anterior nodule 1.0 x 0.6 x 0.6 cm in size, right posterior/inferior nodule at 1.0 x 0.5 x 1.0 cm in size, left inferior nodule at 0.6 x 0.5 x 0.7 cm in size.     Past medical history  #1 history of smoking-quit  #2 de Quervain's tenosynovitis - improved with steroid injection in May 2017    Family history  Very strong family history of diabetes in mother, father, and sister    ROS:  Per HPI      EXAM:  Blood pressure 110/73, height 1.753 m (5' 9\"), weight 86.7 kg (191 lb 3.2 oz).     GENERAL APPEARANCE: Alert and no distress  NECK: No lymphadenopathy appreciated  Thyroid: Full thyroid appreciated, roughly 30 g.  Eval with floor ultrasound in April 2017 showed the following: Right anterior nodule 1.0 x 0.6 x 0.6 cm in size, right posterior/inferior nodule at 1.0 x 0.5 x 1.0 cm in size, left inferior nodule at 0.6 x 0.5 x 0.7 cm in size.  Comparison with formal ultrasound in 2016, these appear stable not slightly smaller.        August 2017 hemoglobin A1c of 8.6    Assessment/Plan  #1 diabetes  He continues on metformin at 2000 mg daily.  Given his November 2016 glucose of 194 and C-peptide of 4.2, I'm concerned that he may have a component of beta cell failure.  However, he does continue to make insulin as indicated by the positive C-peptide.     His " hemoglobin A1c continues to be higher at 8.6.  He may be amenable to the idea of Jardiance.  However, he would like to try the FreeStyle Amilcar Pro to identify his timing of hyperglycemia as he does not usually check his blood sugars. We will apply this today and will let him know the results.    I will remind him to repeat ophthalmology exam, if this is not done so    #2 hyperlipidemia  November 2016 LDL is 57.  Patient on Lipitor at 40 mg daily.     #3 interest in weight loss  Patient still working on dietary lifestyle changes    #4 multinodular goiter  October 2016 neck ultrasound shows a multinodular goiter with the largest nodule roughly 1.1 cm on the right (two nodules at 1.1 cm)  and the largest nodule on the left side at 1.2 cm.  Patient had declined biopsy.    We'll consider repeat formal neck ultrasound in October 2017 or October 2018    #5 right thumb pain due to de Quervain's tenosynovitis  Now resolved with recent steroid injection in May 2017.    Return visit planned in 3 months.    25 minutes spent with patient of which 20 minutes was spent in face-to-face discussion about diabetes mgmt and coordination of care

## 2017-08-11 NOTE — NURSING NOTE
"Chief Complaint   Patient presents with     RECHECK     DIABETES AND GOITER F/U        Initial /73 (BP Location: Right arm, Patient Position: Sitting, Cuff Size: Adult Regular)  Ht 1.753 m (5' 9\")  Wt 86.7 kg (191 lb 3.2 oz)  BMI 28.24 kg/m2 Estimated body mass index is 28.24 kg/(m^2) as calculated from the following:    Height as of this encounter: 1.753 m (5' 9\").    Weight as of this encounter: 86.7 kg (191 lb 3.2 oz).  Medication Reconciliation: complete       Performed A1C test - patient tolerated well.    Katerina Bautista, [facility]       "

## 2017-08-11 NOTE — NURSING NOTE
Placed Amilcar Sensor per . Patient identified by name and . Patient tolerated placement well.  Sensor was placed on patient's LEFT upper back part of arm. Patient was given an appointment to come back in 2 weeks for download and given instructions on how to care for the sensor.    AMILCAR PRO:  CODE: H76  SN: [id number]  EXP: 2018  LOT: [id number]  NDC: [address]-37    Katerina Bautista [facility]

## 2017-08-11 NOTE — LETTER
"8/11/2017       RE: Chema Mccullough  [address] [place] [address]     Dear Colleague,    Thank you for referring your patient, Chema Mccullough, to the [facility] ENDOCRINOLOGY at [facility]. Please see a copy of my visit note below.    #1 Diabetes.  States that he has been pre-diabetic for since 2004 He presented to his PCP who did routine blood work and found his fasting BS to be 184 with an A1c of 7.5.  This was started in the beginning of February 2009. On Metformin XR. In December 2011 it was 7.5.  In January 2013, it was 10.2. March 2013 is 9.3.  In March of 2013, I had the patient increase his metformin XR to 500 mg twice daily.June 2013 hemoglobin A1c of 8.3. January 2014 hemoglobin A1c of 8.2.  July 2014 hemoglobin A1c of 8.8.  With dietary and lifestyle changes, the patient's November 2014 hemoglobin A1c is 7.7.  April 2015 hemoglobin A1c of 8.7.  November 2015 hemoglobin A1c of 8.8.  November 2015 evaluation significant for detectable C-peptide at 3.2.  February 2016 hemoglobin A1c of 8.6.  June 2016 Hemoglobin A1c has remained stable at 8.6 so I increased the patient to metformin 2000 mg daily. August 2016 hemoglobin A1c of 8.8.  Patient now on metformin at 2000 mg daily as of July 2016.  November 2016 hemoglobin A1c of 7.6. November 2016 glucose was 194 with a C-peptide of 4.2.  April 2017 hemoglobin A1c of 8.6.    Review of systems related to diabetes  Cardiovascular: .  Currently on Lipitor.  November 2016 LDL was 57.  patient report, he had a stress test in 2016 that was \"clean\"  Ophthalmology: Eye exam negative in May 2015  Neuro: Patient denies  Nephrology: negative in November 2016  Infection: Patient denies  Immunizations: Patient reports flu shot in 2016  Dental: Patient reports up to date    Interval history since last visit:   August 2017 hemoglobin A1c 8.6. The patient reports his weight is stable.  Continues to have some dietary " "indiscretion.  Continues on metformin 2000 mg daily.    # 2 hyperlipidemia  On Lipitor 40 mg daily.  November 2015 LDL 75. stress test in 2016 that was unremarkable      Interval history since last visit: Patient continues on Lipitor. November 2016 LDL was 57.    #3. Interest in weight loss   The patient has been working on lifestyle changes.    Interval history since last visit: The patient has now bought at  rowing machine and is exercising.    #4 goiter  As noted during his evaluation in June 2016.  He was noted to have a multinodular goiter with several nodules roughly 1 cm in size. October 2016 formal neck ultrasound showed multinodular goiter, with  largest nodules on the right side at 1.1 cm in size (two nodules) and the largest nodule on the left side at 1.2 cm in size.  The patient had declined ultrasound-guided FNA in October 2016.    Interval history: Pt  denies any interim change. Eval with floor ultrasound in April 2017 showed the following: Right anterior nodule 1.0 x 0.6 x 0.6 cm in size, right posterior/inferior nodule at 1.0 x 0.5 x 1.0 cm in size, left inferior nodule at 0.6 x 0.5 x 0.7 cm in size.     Past medical history  #1 history of smoking-quit  #2 de Quervain's tenosynovitis - improved with steroid injection in May 2017    Family history  Very strong family history of diabetes in mother, father, and sister    ROS:  Per HPI      EXAM:  Blood pressure 110/73, height 1.753 m (5' 9\"), weight 86.7 kg (191 lb 3.2 oz).     GENERAL APPEARANCE: Alert and no distress  NECK: No lymphadenopathy appreciated  Thyroid: Full thyroid appreciated, roughly 30 g.  Eval with floor ultrasound in April 2017 showed the following: Right anterior nodule 1.0 x 0.6 x 0.6 cm in size, right posterior/inferior nodule at 1.0 x 0.5 x 1.0 cm in size, left inferior nodule at 0.6 x 0.5 x 0.7 cm in size.  Comparison with formal ultrasound in 2016, these appear stable not slightly smaller.        August 2017 hemoglobin A1c of " 8.6    Assessment/Plan  #1 diabetes  He continues on metformin at 2000 mg daily.  Given his November 2016 glucose of 194 and C-peptide of 4.2, I'm concerned that he may have a component of beta cell failure.  However, he does continue to make insulin as indicated by the positive C-peptide.     His hemoglobin A1c continues to be higher at 8.6.  He may be amenable to the idea of Jardiance.  However, he would like to try the FreeStyle Amilcar Pro to identify his timing of hyperglycemia as he does not usually check his blood sugars. We will apply this today and will let him know the results.    I will remind him to repeat ophthalmology exam, if this is not done so    #2 hyperlipidemia  November 2016 LDL is 57.  Patient on Lipitor at 40 mg daily.     #3 interest in weight loss  Patient still working on dietary lifestyle changes    #4 multinodular goiter  October 2016 neck ultrasound shows a multinodular goiter with the largest nodule roughly 1.1 cm on the right (two nodules at 1.1 cm)  and the largest nodule on the left side at 1.2 cm.  Patient had declined biopsy.    We'll consider repeat formal neck ultrasound in October 2017 or October 2018    #5 right thumb pain due to de Quervain's tenosynovitis  Now resolved with recent steroid injection in May 2017.    Return visit planned in 3 months.    25 minutes spent with patient of which 20 minutes was spent in face-to-face discussion about diabetes mgmt and coordination of care    Again, thank you for allowing me to participate in the care of your patient.      Sincerely,    Cheri Watson MD

## 2017-08-23 ENCOUNTER — ALLIED HEALTH/NURSE VISIT (OUTPATIENT)
Dept: ENDOCRINOLOGY | Facility: CLINIC | Age: 57
End: 2017-08-23

## 2017-08-23 DIAGNOSIS — E11.9 TYPE 2 DIABETES MELLITUS WITHOUT COMPLICATION, WITHOUT LONG-TERM CURRENT USE OF INSULIN (H): Primary | ICD-10-CM

## 2017-08-23 NOTE — MR AVS SNAPSHOT
After Visit Summary   8/23/2017    Chema Mccullough    MRN: [id number]           Patient Information     Date Of Birth          1960        Visit Information        Provider Department      8/23/2017 10:00 AM Nurse, [facility] End[facility]        Today's Diagnoses     Type 2 diabetes mellitus without complication, without long-term current use of insulin (H)    -  1       Follow-ups after your visit        Your next 10 appointments already scheduled     Dec 01, 2017 10:00 AM CST   (Arrive by 9:45 AM)   RETURN DIABETES with MD BELKIS Moran [facility] ([facility] [place])    [address] [place] [address] [phone]              Who to contact     Please call your clinic at [phone] to:    Ask questions about your health    Make or cancel appointments    Discuss your medicines    Learn about your test results    Speak to your doctor   If you have compliments or concerns about an experience at your clinic, or if you wish to file a complaint, please contact [facility] Patient Relations at [phone] or email us at [email or web address]         Additional Information About Your Visit        MyChart Information     [employer] gives you secure access to your electronic health record. If you see a primary care provider, you can also send messages to your care team and make appointments. If you have questions, please call your primary care clinic.  If you do not have a primary care provider, please call [phone] and they will assist you.      Needium is an electronic gateway that provides easy, online access to your medical records. With [employer], you can request a clinic appointment, read your test results, renew a prescription or communicate with your care team.     To access your existing account, please contact your [facility] or call [phone] for  assistance.        Care EveryWhere ID     This is your Care EveryWhere ID. This could be used by other organizations to access your [facility] medical records  [id number]         Blood Pressure from Last 3 Encounters:   08/11/17 110/73   04/14/17 126/76   11/18/16 109/72    Weight from Last 3 Encounters:   08/11/17 86.7 kg (191 lb 3.2 oz)   05/09/17 86.6 kg (191 lb)   04/14/17 85.4 kg (188 lb 4.8 oz)              Today, you had the following     No orders found for display       Primary Care Provider Office Phone # Fax #    Sharla Wu [phone]       [place] [address]        Equal Access to Services     ADRIENNE RESTREPO : Hadii binu woods hadasho Soomaali, waaxda luqadaha, qaybta kaalmada adeegyada, danny mckenzie . So [facility] [phone].    ATENCIÓN: Si habla español, tiene a castro disposición servicios gratuitos de asistencia lingüística. Llame al [phone].    We comply with applicable federal civil rights laws and [place] laws. We do not discriminate on the basis of race, color, national origin, age, disability sex, sexual orientation or gender identity.            Thank you!     Thank you for choosing [facility]  for your care. Our goal is always to provide you with excellent care. Hearing back from our patients is one way we can continue to improve our services. Please take a few minutes to complete the written survey that you may receive in the mail after your visit with us. Thank you!             Your Updated Medication List - Protect others around you: Learn how to safely use, store and throw away your medicines at www.disposemymeds.org.          This list is accurate as of: 8/23/17 10:42 AM.  Always use your most recent med list.                   Brand Name Dispense Instructions for use Diagnosis    aspirin 81 MG tablet     90 tablet    Take 1 tablet (81 mg) by mouth daily    Type 2 diabetes  mellitus without complication, without long-term current use of insulin (H)       atorvastatin 40 MG tablet    LIPITOR    90 tablet    Take 1 tablet (40 mg) by mouth daily Take one daily    Type 2 diabetes mellitus without complication, without long-term current use of insulin (H)       metFORMIN 1000 MG tablet    GLUCOPHAGE    180 tablet    Take 1 tablet (1,000 mg) by mouth 2 times daily (with meals)    Type 2 diabetes mellitus without complication, without long-term current use of insulin (H)       OMEGA 3 PO      Take by mouth daily

## 2017-08-23 NOTE — NURSING NOTE
REMOVED MAXIM SENSOR AND DOWNLOADED.  PATIENT DID NOT BRING HIS GLUCOSE METER. REPORT WAS PRINTED AND PLACED IN DR. COUCH'S BOX.      Katerina Bautista CMA

## 2017-08-25 PROBLEM — M65.4 RADIAL STYLOID TENOSYNOVITIS: Status: RESOLVED | Noted: 2017-05-09 | Resolved: 2017-08-25

## 2017-08-29 ENCOUNTER — TELEPHONE (OUTPATIENT)
Dept: ENDOCRINOLOGY | Facility: CLINIC | Age: 57
End: 2017-08-29

## 2017-08-29 NOTE — TELEPHONE ENCOUNTER
CGMS results reviewed. Fasting blood sugar is generally in the 180s.  The patient has a peak in his blood sugar around 11 AM, 3 PM, and midnight. These blood sugar peaks are easily in the 250s. Average blood sugar 218. Patient away on a trip. We would discuss when he returns. We will scan in.

## 2017-12-04 ENCOUNTER — TELEPHONE (OUTPATIENT)
Dept: ENDOCRINOLOGY | Facility: CLINIC | Age: 57
End: 2017-12-04

## 2017-12-04 NOTE — TELEPHONE ENCOUNTER
----- Message from Jordyn Luque RN sent at 12/4/2017 12:55 PM CST -----  Regarding: FW: Pt of Dr. Watson, pt missed appt on 12/01/17 and per wife Theoni he needs to get in  Contact: [phone]  He wants to see you sooner and you have nothing open until March, he will be unavailable  12/06/17 until 12/15/17  ----- Message -----     From: Yady Golden     Sent: 12/4/2017  12:43 PM       To: [facility] Triage-  Subject: Pt of Dr. Watson, pt missed appt on 12/01/17 a#    Pt of Dr. Watson, pt missed appt on 12/01/17 and per wife Theoni he needs to get in soon.  Pt will be leaving 12/06/17 until 12/15/17, Dr. Watson's next available is not until March.  Pt's wife would like a call today at the number listed above or her cell phone at [phone] please.    Thank you,    Silvia PINA  [facility]    Please DO NOT send this message and/or reply back to sender.  Call Center Representatives DO NOT respond to messages.

## 2017-12-04 NOTE — TELEPHONE ENCOUNTER
APPT INFO    Date /Time: 12/18/17 10AM   Reason for Appt: Rt Hand Pain   Ref Provider/Clinic: Dr. Baeza   Are there internal records? If yes, list: YES -     [facility] -- Dr. Cheri Watson  [facility]   Patient Contact (Y/N) & Call Details: No - internal referral   Action: --

## 2017-12-08 DIAGNOSIS — Z53.9 ERRONEOUS ENCOUNTER--DISREGARD: Primary | ICD-10-CM

## 2017-12-18 ENCOUNTER — OFFICE VISIT (OUTPATIENT)
Dept: ORTHOPEDICS | Facility: CLINIC | Age: 57
End: 2017-12-18
Payer: COMMERCIAL

## 2017-12-18 ENCOUNTER — PRE VISIT (OUTPATIENT)
Dept: ORTHOPEDICS | Facility: CLINIC | Age: 57
End: 2017-12-18

## 2017-12-18 VITALS — BODY MASS INDEX: 27.06 KG/M2 | HEIGHT: 70 IN | WEIGHT: 189 LBS

## 2017-12-18 DIAGNOSIS — M65.4 RADIAL STYLOID TENOSYNOVITIS: Primary | ICD-10-CM

## 2017-12-18 NOTE — MR AVS SNAPSHOT
After Visit Summary   12/18/2017    Chema Mccullough    MRN: [id number]           Patient Information     Date Of Birth          1960        Visit Information        Provider Department      12/18/2017 10:00 AM Jason Narvaez MD [facility]        Today's Diagnoses     Radial styloid tenosynovitis    -  1       Follow-ups after your visit        Your next 10 appointments already scheduled     Dec 22, 2017  4:30 PM CST   (Arrive by 4:15 PM)   RETURN DIABETES with Cheri Watson MD   [facility] ([facility])    [address] [place] [id number] [phone]            Feb 28, 2018  9:45 AM CST   (Arrive by 9:30 AM)   New Patient Visit with Cassandra Garcia MD   [facility] ([facility])    [address] [place] [id number] [phone]              Who to contact     Please call your clinic at [phone] to:    Ask questions about your health    Make or cancel appointments    Discuss your medicines    Learn about your test results    Speak to your doctor   If you have compliments or concerns about an experience at your clinic, or if you wish to file a complaint, please contact [facility] Patient Relations at [phone] or email us at [email or web address]         Additional Information About Your Visit        MyChart Information     Before the Callt gives you secure access to your electronic health record. If you see a primary care provider, you can also send messages to your care team and make appointments. If you have questions, please call your primary care clinic.  If you do not have a primary care provider, please call [phone] and they will assist you.      ClipCard is an electronic gateway that provides easy, online access to your medical records. With ClipCard, you can request a clinic appointment, read  "your test results, renew a prescription or communicate with your care team.     To access your existing account, please contact your [facility] Clinic or call [phone] for assistance.        Care EveryWhere ID     This is your Care EveryWhere ID. This could be used by other organizations to access your [place] medical records  [id number]        Your Vitals Were     Height BMI (Body Mass Index)                1.778 m (5' 10\") 27.12 kg/m2           Blood Pressure from Last 3 Encounters:   08/11/17 110/73   04/14/17 126/76   11/18/16 109/72    Weight from Last 3 Encounters:   12/18/17 85.7 kg (189 lb)   08/11/17 86.7 kg (191 lb 3.2 oz)   05/09/17 86.6 kg (191 lb)              Today, you had the following     No orders found for display       Primary Care Provider Office Phone # Fax #    Sharla Wu [phone]       [place] [address]        Equal Access to Services     [facility]: Hadii aad ku hadasho Soomaali, waaxda luqadaha, qaybta kaalmada adeegyalubna, danny mckenzie . So [facility] [phone].    ATENCIÓN: Si habla español, tiene a castro disposición servicios gratuitos de asistencia lingüística. [facility] [phone].    We comply with applicable federal civil rights laws and [place] laws. We do not discriminate on the basis of race, color, national origin, age, disability, sex, sexual orientation, or gender identity.            Thank you!     Thank you for choosing [facility]  for your care. Our goal is always to provide you with excellent care. Hearing back from our patients is one way we can continue to improve our services. Please take a few minutes to complete the written survey that you may receive in the mail after your visit with us. Thank you!             Your Updated Medication List - Protect others around you: Learn how to safely use, store and throw away " your medicines at www.disposemymeds.org.          This list is accurate as of: 12/18/17 10:58 AM.  Always use your most recent med list.                   Brand Name Dispense Instructions for use Diagnosis    aspirin 81 MG tablet     90 tablet    Take 1 tablet (81 mg) by mouth daily    Type 2 diabetes mellitus without complication, without long-term current use of insulin (H)       atorvastatin 40 MG tablet    LIPITOR    90 tablet    Take 1 tablet (40 mg) by mouth daily Take one daily    Type 2 diabetes mellitus without complication, without long-term current use of insulin (H)       metFORMIN 1000 MG tablet    GLUCOPHAGE    180 tablet    Take 1 tablet (1,000 mg) by mouth 2 times daily (with meals)    Type 2 diabetes mellitus without complication, without long-term current use of insulin (H)       OMEGA 3 PO      Take by mouth daily

## 2017-12-18 NOTE — NURSING NOTE
"Reason For Visit:   Chief Complaint   Patient presents with     Hand Pain     pt states he is here to follow up on his right De Quarvains tenosynovitis he states there is very minor pain from time to time       Primary MD: Sharla Wu  Ref. 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  Work status?  Full time.  Smoker: No    Ht 1.778 m (5' 10\")  Wt 85.7 kg (189 lb)  BMI 27.12 kg/m2      Pain Assessment  Patient Currently in Pain: No    Hand Dominance Evaluation  Hand Dominance: Right  Pinch force  R hand key force: 7.711 kg (17 lb)  L hand key force: 13.6 kg (30 lb)   force  R hand  level 2 force: 45.4 kg (100 lb)  L hand  level  2 force: 40.8 kg (90 lb)    QuickDASH Assessment  QuickDASH Main 5/9/2017   1.Open a tight or new jar. Moderate difficulty   2. Do heavy household chores (e.g., wash walls, floors) No difficulty   3. Carry a shopping bag or briefcase. No difficulty   4. Wash your back. Severe difficulty   5. Use a knife to cut food. Mild difficulty   6. Recreational activities in which you take some force or impact through your arm, shoulder or hand (e.g., golf, hammering, tennis, etc.). Mild difficulty   7. During the past week, to what extent has your arm, shoulder or hand problem interfered with your normal social activities with family, friends, neighbours or groups? Slightly   8. During the past week, were you limited in your work or other regular daily activities as a result of your arm, shoulder or hand problem? Slightly limited   9. Arm, shoulder or hand pain. Moderate   10.Tingling (pins and needles) in your arm,shoulder or hand. None   11. During the past week, how much difficulty have you had sleeping because of the pain in your arm, shoulder or hand? ([place] number) Mild difficulty   Quickdash Ability Score 27.27          No Known Allergies    Pete Stanford, THUAN    "

## 2017-12-18 NOTE — LETTER
"12/18/2017       RE: Chema Mccullough  [address] [place] [address]     Dear Colleague,    Thank you for referring your patient, Chema Mccullough, to the [facility] at [facility]. Please see a copy of my visit note below.    Date of Service: Dec 18, 2017    Chief Complaint:   Chief Complaint   Patient presents with     Hand Pain     pt states he is here to follow up on his right De Quarvains tenosynovitis he states there is very minor pain from time to time       History of Present Illness: Chema Mccullough is a 57 year old, male who presents today for further evaluation of right  DeQuervain tenosynovitis. He is seen by Dr. Baeza on May 9, at which point he received an injection. He reports that the injection was very helpful. It improved his symptoms markedly and they have not recurred. At this point he just has very intermittent symptoms, only once or twice a month. The pain is not interfering with his activities at all. He has a brace but he has not been wearing it because he doesn't feel it is necessary at this point.    Review of Systems: Positive for intermittent right wrist pain. No numbness and tingling. No pain in the left hand or wrist.    Physical examination:  Height 1.778 m (5' 10\"), weight 85.7 kg (189 lb).     Well-developed, well-nourished and in no acute distress.  Alert and oriented to surroundings.    On examination of the right upper extremity, there is mild tenderness and swelling in the first dorsal compartment. There is a negative Finkelstein's test. There is no pain with thumb extension against resistance. Sensation is intact in median, radial, and ulnar nerve distributions. No tenderness of some CMC joint.    Assessment: Improving right thumb DeQuervain's tenosynovitis following injection in May.    Plan:   There is some residual inflammation and soreness on my exam. However, the patient reports that the pain only " occurs once or twice a month and is not interfering with his activities whatsoever. Therefore I recommend continued conservative management. We discussed that if his symptoms do worsen, he should come back and we can discuss either repeat injection, resumption of splinting, or surgery.      Sincerely,    Jason Narvaez MD

## 2017-12-18 NOTE — PROGRESS NOTES
"Date of Service: Dec 18, 2017    Chief Complaint:   Chief Complaint   Patient presents with     Hand Pain     pt states he is here to follow up on his right De Quarvains tenosynovitis he states there is very minor pain from time to time       History of Present Illness: Chema Mccullough is a 57 year old, male who presents today for further evaluation of right  DeQuervain tenosynovitis. He is seen by Dr. Baeza on May 9, at which point he received an injection. He reports that the injection was very helpful. It improved his symptoms markedly and they have not recurred. At this point he just has very intermittent symptoms, only once or twice a month. The pain is not interfering with his activities at all. He has a brace but he has not been wearing it because he doesn't feel it is necessary at this point.    Review of Systems: Positive for intermittent right wrist pain. No numbness and tingling. No pain in the left hand or wrist.    Physical examination:  Height 1.778 m (5' 10\"), weight 85.7 kg (189 lb).     Well-developed, well-nourished and in no acute distress.  Alert and oriented to surroundings.    On examination of the right upper extremity, there is mild tenderness and swelling in the first dorsal compartment. There is a negative Finkelstein's test. There is no pain with thumb extension against resistance. Sensation is intact in median, radial, and ulnar nerve distributions. No tenderness of some CMC joint.    Assessment: Improving right thumb DeQuervain's tenosynovitis following injection in May.    Plan:   There is some residual inflammation and soreness on my exam. However, the patient reports that the pain only occurs once or twice a month and is not interfering with his activities whatsoever. Therefore I recommend continued conservative management. We discussed that if his symptoms do worsen, he should come back and we can discuss either repeat injection, resumption of splinting, or surgery.  "

## 2017-12-22 ENCOUNTER — OFFICE VISIT (OUTPATIENT)
Dept: ENDOCRINOLOGY | Facility: CLINIC | Age: 57
End: 2017-12-22
Payer: COMMERCIAL

## 2017-12-22 VITALS
SYSTOLIC BLOOD PRESSURE: 109 MMHG | BODY MASS INDEX: 27.96 KG/M2 | DIASTOLIC BLOOD PRESSURE: 72 MMHG | HEART RATE: 80 BPM | WEIGHT: 188.8 LBS | HEIGHT: 69 IN

## 2017-12-22 DIAGNOSIS — E11.9 TYPE 2 DIABETES MELLITUS WITHOUT COMPLICATION, WITHOUT LONG-TERM CURRENT USE OF INSULIN (H): ICD-10-CM

## 2017-12-22 DIAGNOSIS — E04.9 GOITER: ICD-10-CM

## 2017-12-22 DIAGNOSIS — E11.9 TYPE 2 DIABETES MELLITUS WITHOUT COMPLICATION, WITHOUT LONG-TERM CURRENT USE OF INSULIN (H): Primary | ICD-10-CM

## 2017-12-22 LAB
ANION GAP SERPL CALCULATED.3IONS-SCNC: 8 MMOL/L (ref 3–14)
BUN SERPL-MCNC: 10 MG/DL (ref 7–30)
CALCIUM SERPL-MCNC: 9.3 MG/DL (ref 8.5–10.1)
CHLORIDE SERPL-SCNC: 104 MMOL/L (ref 94–109)
CHOLEST SERPL-MCNC: 120 MG/DL
CO2 SERPL-SCNC: 27 MMOL/L (ref 20–32)
CREAT SERPL-MCNC: 0.71 MG/DL (ref 0.66–1.25)
CREAT UR-MCNC: 35 MG/DL
GFR SERPL CREATININE-BSD FRML MDRD: >90 ML/MIN/1.7M2
GLUCOSE SERPL-MCNC: 204 MG/DL (ref 70–99)
HBA1C MFR BLD: 7.8 % (ref 4.3–6)
HDLC SERPL-MCNC: 37 MG/DL
LDLC SERPL CALC-MCNC: 44 MG/DL
MICROALBUMIN UR-MCNC: <5 MG/L
MICROALBUMIN/CREAT UR: NORMAL MG/G CR (ref 0–17)
NONHDLC SERPL-MCNC: 84 MG/DL
POTASSIUM SERPL-SCNC: 4.1 MMOL/L (ref 3.4–5.3)
SODIUM SERPL-SCNC: 138 MMOL/L (ref 133–144)
TRIGL SERPL-MCNC: 197 MG/DL
TSH SERPL DL<=0.005 MIU/L-ACNC: 0.72 MU/L (ref 0.4–4)

## 2017-12-22 RX ORDER — GLIMEPIRIDE 1 MG/1
1 TABLET ORAL
Qty: 90 TABLET | Refills: 3 | Status: SHIPPED | OUTPATIENT
Start: 2017-12-22 | End: 2018-10-26

## 2017-12-22 NOTE — PROGRESS NOTES
Dear Chema    Here are your labs so far.  The triglycerides and glucose will improve when we start the glimepiride.  Your thyroid function is normal.     If you have any questions, please feel free to contact my nurse at [phone] select option #3 for triage nurse  or  option #1 for scheduling related questions.    Regards    Cheri Watson MD

## 2017-12-22 NOTE — LETTER
Patient:  Chema Mccullough  :   1960  MRN:     [id number]        Mr.Georgios Mccullough  [address] [place] [address]        2017    Dear Chema    Here are your labs. We will work on reducing your sugar which will help. Your thyroid, urine tests, and vitamin D were all normal.    If you have any questions, please feel free to contact my nurse at [phone] select option #3 for triage nurse  or  option #1 for scheduling related questions.    Regards    Cheri Watson MD        Resulted Orders   25 Hydroxyvitamin D2 and D3   Result Value Ref Range    25 OH Vit D2 <5 ug/L    25 OH Vit D3 20 ug/L    25 OH Vit D total <25 20 - 75 ug/L      Comment:      Season, race, dietary intake, and treatment affect the concentration of   25-hydroxy-Vitamin D. Values may decrease during winter months and increase   during summer months. Values 20-29 ug/L may indicate Vitamin D insufficiency   and values <20 ug/L may indicate Vitamin D deficiency.  This test was developed and its performance characteristics determined by the   [facility],  Special [facility] Laboratory. It has   not been cleared or approved by the FDA. The laboratory is regulated under   CLIA as qualified to perform high-complexity testing. This test is used for   clinical purposes. It should not be regarded as investigational or for   research.     TSH   Result Value Ref Range    TSH 0.72 0.40 - 4.00 mU/L   Basic metabolic panel   Result Value Ref Range    Sodium 138 133 - 144 mmol/L    Potassium 4.1 3.4 - 5.3 mmol/L    Chloride 104 94 - 109 mmol/L    Carbon Dioxide 27 20 - 32 mmol/L    Anion Gap 8 3 - 14 mmol/L    Glucose 204 (H) 70 - 99 mg/dL    Urea Nitrogen 10 7 - 30 mg/dL    Creatinine 0.71 0.66 - 1.25 mg/dL    GFR Estimate >90 >60 mL/min/1.7m2      Comment:      Non  GFR Calc    GFR Estimate If Black >90 >60 mL/min/1.7m2      Comment:       GFR Calc    Calcium 9.3  8.5 - 10.1 mg/dL   Albumin Random Urine Quantitative with Creat Ratio   Result Value Ref Range    Creatinine Urine 35 mg/dL    Albumin Urine mg/L <5 mg/L    Albumin Urine mg/g Cr Unable to calculate due to low value 0 - 17 mg/g Cr   Lipid Profile   Result Value Ref Range    Cholesterol 120 <200 mg/dL    Triglycerides 197 (H) <150 mg/dL      Comment:      Borderline high:  150-199 mg/dl  High:             200-499 mg/dl  Very high:       >499 mg/dl      HDL Cholesterol 37 (L) >39 mg/dL    LDL Cholesterol Calculated 44 <100 mg/dL      Comment:      Desirable:       <100 mg/dl    Non HDL Cholesterol 84 <130 mg/dL       Lab

## 2017-12-22 NOTE — LETTER
"12/22/2017       RE: Chema Mccullough  [address] [place] [address]     Dear Colleague,    Thank you for referring your patient, Chema Mccullough, to the [facility] at [facility]. Please see a copy of my visit note below.    #1 Diabetes.  States that he has been pre-diabetic for since 2004 He presented to his PCP who did routine blood work and found his fasting BS to be 184 with an A1c of 7.5.  This was started in the beginning of February 2009. On Metformin XR. In December 2011 it was 7.5.  In January 2013, it was 10.2. March 2013 is 9.3.  In March of 2013, I had the patient increase his metformin XR to 500 mg twice daily.June 2013 hemoglobin A1c of 8.3. January 2014 hemoglobin A1c of 8.2.  July 2014 hemoglobin A1c of 8.8.  With dietary and lifestyle changes, the patient's November 2014 hemoglobin A1c is 7.7.  April 2015 hemoglobin A1c of 8.7.  November 2015 hemoglobin A1c of 8.8.  November 2015 evaluation significant for detectable C-peptide at 3.2.  February 2016 hemoglobin A1c of 8.6.  June 2016 Hemoglobin A1c has remained stable at 8.6 so I increased the patient to metformin 2000 mg daily. August 2016 hemoglobin A1c of 8.8.  Patient now on metformin at 2000 mg daily as of July 2016.  November 2016 hemoglobin A1c of 7.6. November 2016 glucose was 194 with a C-peptide of 4.2.  April 2017 hemoglobin A1c of 8.6. August 2017 hemoglobin A1c 8.6.    Review of systems related to diabetes  Cardiovascular: .  Currently on Lipitor.   patient report, he had a stress test in 2016 that was \"clean\" December 2017 LDL is 44.  Ophthalmology: Eye exam negative in May 2015  Neuro: Patient denies  Nephrology: negative in November 2016  Infection: Patient denies  Immunizations: Patient reports flu shot in 2016  Dental: Patient reports up to date    Interval history since last visit: Continues on metformin 2000 mg daily.  Reviewing his CG MS from August 2017, the " "patient is noted to have particularly high blood sugars associated postprandially.    # 2 hyperlipidemia  On Lipitor 40 mg daily.  November 2015 LDL 75. stress test in 2016 that was unremarkable. November 2016 LDL was 57.    Interval history since last visit: Patient continues on Lipitor.     #3. Interest in weight loss   The patient has been working on lifestyle changes.    Interval history since last visit: The patient has now bought at  rowing machine and is exercising.    #4 goiter  As noted during his evaluation in June 2016.  He was noted to have a multinodular goiter with several nodules roughly 1 cm in size. October 2016 formal neck ultrasound showed multinodular goiter, with  largest nodules on the right side at 1.1 cm in size (two nodules) and the largest nodule on the left side at 1.2 cm in size.  The patient had declined ultrasound-guided FNA in October 2016.Eval with floor ultrasound in April 2017 showed the following: Right anterior nodule 1.0 x 0.6 x 0.6 cm in size, right posterior/inferior nodule at 1.0 x 0.5 x 1.0 cm in size, left inferior nodule at 0.6 x 0.5 x 0.7 cm in size.     Interval history: Pt  denies any interim change.     Past medical history  #1 history of smoking-quit  #2 de Quervain's tenosynovitis - improved with steroid injection in May 2017    Family history  Very strong family history of diabetes in mother, father, and sister    ROS:  Per HPI    EXAM:  Blood pressure 109/72, pulse 80, height 1.753 m (5' 9\"), weight 85.6 kg (188 lb 12.8 oz).     GENERAL APPEARANCE: Alert and no distress  Thyroid: Eval with floor ultrasound in April 2017 showed the following: Right anterior nodule 1.0 x 0.6 x 0.6 cm in size, right posterior/inferior nodule at 1.0 x 0.5 x 1.0 cm in size, left inferior nodule at 0.6 x 0.5 x 0.7 cm in size.  Comparison with formal ultrasound in 2016, these appear stable not slightly smaller.    December 2017 hemoglobin A1c of 7.8.    Assessment/Plan  #1 diabetes  He " continues on metformin at 2000 mg daily.  Given his November 2016 glucose of 194 and C-peptide of 4.2, I'm concerned that he may have a component of beta cell failure.  However, he does continue to make insulin as indicated by the positive C-peptide.  We will schedule patient for eye exam.    Of note, his  Hgba1c is better at 7.8. Reviewing his August 2017 CGMS results, it is clear that he has high blood sugars postprandially.  However the patient is not interested in any injectable medication including insulin or GLP ones.  He is also not very interested in SGLT2 inhibitors.  Given that much of his blood sugars is higher postprandially, we will have the patient try a sulfonylurea in addition to the Metformin.  Will have pt add glimepiride at 1 mg daily.  He will continue with dietary lifestyle changes.    We will check vitamin D, TSH, basic metabolic panel, urine for microalbumin, lipid profile today.    Orders Only on 12/22/2017   Component Date Value Ref Range Status     TSH 12/22/2017 0.72  0.40 - 4.00 mU/L Final     Sodium 12/22/2017 138  133 - 144 mmol/L Final     Potassium 12/22/2017 4.1  3.4 - 5.3 mmol/L Final     Chloride 12/22/2017 104  94 - 109 mmol/L Final     Carbon Dioxide 12/22/2017 27  20 - 32 mmol/L Final     Anion Gap 12/22/2017 8  3 - 14 mmol/L Final     Glucose 12/22/2017 204* 70 - 99 mg/dL Final     Urea Nitrogen 12/22/2017 10  7 - 30 mg/dL Final     Creatinine 12/22/2017 0.71  0.66 - 1.25 mg/dL Final     GFR Estimate 12/22/2017 >90  >60 mL/min/1.7m2 Final    Non  GFR Calc     GFR Estimate If Black 12/22/2017 >90  >60 mL/min/1.7m2 Final    African American GFR Calc     Calcium 12/22/2017 9.3  8.5 - 10.1 mg/dL Final     Creatinine Urine 12/22/2017 35  mg/dL Final     Albumin Urine mg/L 12/22/2017 <5  mg/L Final     Albumin Urine mg/g Cr 12/22/2017 Unable to calculate due to low value  0 - 17 mg/g Cr Final     Cholesterol 12/22/2017 120  <200 mg/dL Final     Triglycerides  12/22/2017 197* <150 mg/dL Final    Comment: Borderline high:  150-199 mg/dl  High:             200-499 mg/dl  Very high:       >499 mg/dl       HDL Cholesterol 12/22/2017 37* >39 mg/dL Final     LDL Cholesterol Calculated 12/22/2017 44  <100 mg/dL Final    Desirable:       <100 mg/dl     Non HDL Cholesterol 12/22/2017 84  <130 mg/dL Final   Office Visit on 12/22/2017   Component Date Value Ref Range Status     Hemoglobin A1C 12/22/2017 7.8* 4.3 - 6.0 % Final     #2 hyperlipidemia  Patient on Lipitor at 40 mg daily. Will check lipid profile.      #3 interest in weight loss  Patient still working on dietary lifestyle changes    #4 multinodular goiter  October 2016 neck ultrasound shows a multinodular goiter with the largest nodule roughly 1.1 cm on the right (two nodules at 1.1 cm)  and the largest nodule on the left side at 1.2 cm.  Patient had declined biopsy.    Will repeat US in April 2018 - ordered    #5 right thumb pain due to de Quervain's tenosynovitis  Now resolved with recent steroid injection in May 2017.    Return visit planned in 3 months.    25 minutes spent with patient of which 20 minutes was spent in face-to-face discussion about diabetes mgmt and coordination of care    Again, thank you for allowing me to participate in the care of your patient.      Sincerely,  Cheri Watson MD

## 2017-12-22 NOTE — NURSING NOTE
"Chief Complaint   Patient presents with     RECHECK     DIABETES F/U        Initial /72 (BP Location: Right arm, Patient Position: Sitting, Cuff Size: Adult Large)  Pulse 80  Ht 1.753 m (5' 9\")  Wt 85.6 kg (188 lb 12.8 oz)  BMI 27.88 kg/m2 Estimated body mass index is 27.88 kg/(m^2) as calculated from the following:    Height as of this encounter: 1.753 m (5' 9\").    Weight as of this encounter: 85.6 kg (188 lb 12.8 oz).  Medication Reconciliation: complete     Performed A1C test - patient tolerated well.    Katerina Bautista, [facility]       "

## 2017-12-22 NOTE — PATIENT INSTRUCTIONS
Less carbs, less portion side    Do just rowing on alternate days      [facility]: [phone]

## 2017-12-22 NOTE — MR AVS SNAPSHOT
After Visit Summary   12/22/2017    Chema Mccullough    MRN: [id number]           Patient Information     Date Of Birth          1960        Visit Information        Provider Department      12/22/2017 4:30 PM Cheri Watson MD [facility]        Today's Diagnoses     Type 2 diabetes mellitus without complication, without long-term current use of insulin (H)    -  1    Goiter          Care Instructions    Less carbs, less portion side    Do just rowing on alternate days      [facility]: [phone]          Follow-ups after your visit        Additional Services     OPHTHALMOLOGY ADULT REFERRAL       Pt has diabetes                  Follow-up notes from your care team     Return in about 4 months (around 4/22/2018).      Your next 10 appointments already scheduled     Dec 22, 2017  5:00 PM CST   LAB with  LAB   [facility] ([facility])    [address] [place] [address] [id number] [phone]           Please do not eat 10-12 hours before your appointment if you are coming in fasting for labs on lipids, cholesterol, or glucose (sugar). This does not apply to pregnant women. Water, hot tea and black coffee (with nothing added) are okay. Do not drink other fluids, diet soda or chew gum.            Feb 28, 2018  9:45 AM CST   (Arrive by 9:30 AM)   New Patient Visit with Cassandra Garcia MD   [facility] ([facility])    [address] [place] [address] [id number] [phone]            Apr 27, 2018 11:00 AM CDT   (Arrive by 10:45 AM)   RETURN DIABETES with Cheri Watson MD   [facility] ([facility])    [address] [place] [address] [id number] [phone]              Future tests that were ordered for you today     Open Future Orders        Priority Expected Expires Ordered    US Thyroid Routine  7/20/2018  "12/22/2017    25 Hydroxyvitamin D2 and D3 Routine  12/22/2018 12/22/2017    TSH Routine  12/22/2018 12/22/2017    Basic metabolic panel Routine  12/22/2018 12/22/2017    Albumin Random Urine Quantitative with Creat Ratio Routine  12/22/2018 12/22/2017    Lipid Profile Routine  12/22/2018 12/22/2017            Who to contact     Please call your clinic at [phone] to:    Ask questions about your health    Make or cancel appointments    Discuss your medicines    Learn about your test results    Speak to your doctor   If you have compliments or concerns about an experience at your clinic, or if you wish to file a complaint, please contact [facility] Patient Relations at [phone] or email us at [email or web address]         Additional Information About Your Visit        InternetVistahart Information     [email or web address] gives you secure access to your electronic health record. If you see a primary care provider, you can also send messages to your care team and make appointments. If you have questions, please call your primary care clinic.  If you do not have a primary care provider, please call [phone] and they will assist you.      Payveris is an electronic gateway that provides easy, online access to your medical records. With Payveris, you can request a clinic appointment, read your test results, renew a prescription or communicate with your care team.     To access your existing account, please contact your [facility] or call [phone] for assistance.        Care EveryWhere ID     This is your Care EveryWhere ID. This could be used by other organizations to access your [place] medical records  [id number]        Your Vitals Were     Pulse Height BMI (Body Mass Index)             80 1.753 m (5' 9\") 27.88 kg/m2          Blood Pressure from Last 3 Encounters:   12/22/17 109/72   08/11/17 110/73   04/14/17 126/76    Weight from Last 3 Encounters: "   12/22/17 85.6 kg (188 lb 12.8 oz)   12/18/17 85.7 kg (189 lb)   08/11/17 86.7 kg (191 lb 3.2 oz)              We Performed the Following     Hemoglobin A1c POCT     OPHTHALMOLOGY ADULT REFERRAL          Today's Medication Changes          These changes are accurate as of: 12/22/17  4:53 PM.  If you have any questions, ask your nurse or doctor.               Start taking these medicines.        Dose/Directions    glimepiride 1 MG tablet   Commonly known as:  AMARYL   Used for:  Type 2 diabetes mellitus without complication, without long-term current use of insulin (H)   Started by:  Cheri Watson MD        Dose:  1 mg   Take 1 tablet (1 mg) by mouth every morning (before breakfast)   Quantity:  90 tablet   Refills:  3            Where to get your medicines      These medications were sent to [place] [address] [place] [facility] [place], [place] - [address]  [address] [facility] [address]     Phone:  [phone]     glimepiride 1 MG tablet                Primary Care Provider Office Phone # Fax #    Sharla Wu [phone]       [address] [address]        Equal Access to Services     ADRIENNE RESTREPO AH: Hadii binu woods hadasho Soomaali, waaxda luqadaha, qaybta kaalmada adeegyada, danny thomas. So [facility] [phone].    ATENCIÓN: Si habla español, tiene a castro disposición servicios gratuitos de asistencia lingüística. Llame al [phone].    We comply with applicable federal civil rights laws and [place] laws. We do not discriminate on the basis of race, color, national origin, age, disability, sex, sexual orientation, or gender identity.            Thank you!     Thank you for choosing [facility]  for your care. Our goal is always to provide you with excellent care. Hearing back from our patients is one way we can continue to improve our services. Please take a few minutes to complete the written  survey that you may receive in the mail after your visit with us. Thank you!             Your Updated Medication List - Protect others around you: Learn how to safely use, store and throw away your medicines at www.disposemymeds.org.          This list is accurate as of: 12/22/17  4:53 PM.  Always use your most recent med list.                   Brand Name Dispense Instructions for use Diagnosis    aspirin 81 MG tablet     90 tablet    Take 1 tablet (81 mg) by mouth daily    Type 2 diabetes mellitus without complication, without long-term current use of insulin (H)       atorvastatin 40 MG tablet    LIPITOR    90 tablet    Take 1 tablet (40 mg) by mouth daily Take one daily    Type 2 diabetes mellitus without complication, without long-term current use of insulin (H)       glimepiride 1 MG tablet    AMARYL    90 tablet    Take 1 tablet (1 mg) by mouth every morning (before breakfast)    Type 2 diabetes mellitus without complication, without long-term current use of insulin (H)       metFORMIN 1000 MG tablet    GLUCOPHAGE    180 tablet    Take 1 tablet (1,000 mg) by mouth 2 times daily (with meals)    Type 2 diabetes mellitus without complication, without long-term current use of insulin (H)       OMEGA 3 PO      Take by mouth daily

## 2017-12-22 NOTE — PROGRESS NOTES
"#1 Diabetes.  States that he has been pre-diabetic for since 2004 He presented to his PCP who did routine blood work and found his fasting BS to be 184 with an A1c of 7.5.  This was started in the beginning of February 2009. On Metformin XR. In December 2011 it was 7.5.  In January 2013, it was 10.2. March 2013 is 9.3.  In March of 2013, I had the patient increase his metformin XR to 500 mg twice daily.June 2013 hemoglobin A1c of 8.3. January 2014 hemoglobin A1c of 8.2.  July 2014 hemoglobin A1c of 8.8.  With dietary and lifestyle changes, the patient's November 2014 hemoglobin A1c is 7.7.  April 2015 hemoglobin A1c of 8.7.  November 2015 hemoglobin A1c of 8.8.  November 2015 evaluation significant for detectable C-peptide at 3.2.  February 2016 hemoglobin A1c of 8.6.  June 2016 Hemoglobin A1c has remained stable at 8.6 so I increased the patient to metformin 2000 mg daily. August 2016 hemoglobin A1c of 8.8.  Patient now on metformin at 2000 mg daily as of July 2016.  November 2016 hemoglobin A1c of 7.6. November 2016 glucose was 194 with a C-peptide of 4.2.  April 2017 hemoglobin A1c of 8.6. August 2017 hemoglobin A1c 8.6.    Review of systems related to diabetes  Cardiovascular: .  Currently on Lipitor.   patient report, he had a stress test in 2016 that was \"clean\" December 2017 LDL is 44.  Ophthalmology: Eye exam negative in May 2015  Neuro: Patient denies  Nephrology: negative in November 2016  Infection: Patient denies  Immunizations: Patient reports flu shot in 2016  Dental: Patient reports up to date    Interval history since last visit: Continues on metformin 2000 mg daily.  Reviewing his CG MS from August 2017, the patient is noted to have particularly high blood sugars associated postprandially.    # 2 hyperlipidemia  On Lipitor 40 mg daily.  November 2015 LDL 75. stress test in 2016 that was unremarkable. November 2016 LDL was 57.    Interval history since last visit: Patient continues on Lipitor. " "    #3. Interest in weight loss   The patient has been working on lifestyle changes.    Interval history since last visit: The patient has now bought at  rowing machine and is exercising.    #4 goiter  As noted during his evaluation in June 2016.  He was noted to have a multinodular goiter with several nodules roughly 1 cm in size. October 2016 formal neck ultrasound showed multinodular goiter, with  largest nodules on the right side at 1.1 cm in size (two nodules) and the largest nodule on the left side at 1.2 cm in size.  The patient had declined ultrasound-guided FNA in October 2016.Eval with floor ultrasound in April 2017 showed the following: Right anterior nodule 1.0 x 0.6 x 0.6 cm in size, right posterior/inferior nodule at 1.0 x 0.5 x 1.0 cm in size, left inferior nodule at 0.6 x 0.5 x 0.7 cm in size.     Interval history: Pt  denies any interim change.     Past medical history  #1 history of smoking-quit  #2 de Quervain's tenosynovitis - improved with steroid injection in May 2017    Family history  Very strong family history of diabetes in mother, father, and sister    ROS:  Per HPI      EXAM:  Blood pressure 109/72, pulse 80, height 1.753 m (5' 9\"), weight 85.6 kg (188 lb 12.8 oz).     GENERAL APPEARANCE: Alert and no distress  Thyroid: Eval with floor ultrasound in April 2017 showed the following: Right anterior nodule 1.0 x 0.6 x 0.6 cm in size, right posterior/inferior nodule at 1.0 x 0.5 x 1.0 cm in size, left inferior nodule at 0.6 x 0.5 x 0.7 cm in size.  Comparison with formal ultrasound in 2016, these appear stable not slightly smaller.    December 2017 hemoglobin A1c of 7.8.    Assessment/Plan  #1 diabetes  He continues on metformin at 2000 mg daily.  Given his November 2016 glucose of 194 and C-peptide of 4.2, I'm concerned that he may have a component of beta cell failure.  However, he does continue to make insulin as indicated by the positive C-peptide.  We will schedule patient for eye " exam.    Of note, his  Hgba1c is better at 7.8. Reviewing his August 2017 CGMS results, it is clear that he has high blood sugars postprandially.  However the patient is not interested in any injectable medication including insulin or GLP ones.  He is also not very interested in SGLT2 inhibitors.  Given that much of his blood sugars is higher postprandially, we will have the patient try a sulfonylurea in addition to the Metformin.  Will have pt add glimepiride at 1 mg daily.  He will continue with dietary lifestyle changes.    We will check vitamin D, TSH, basic metabolic panel, urine for microalbumin, lipid profile today.    Orders Only on 12/22/2017   Component Date Value Ref Range Status     TSH 12/22/2017 0.72  0.40 - 4.00 mU/L Final     Sodium 12/22/2017 138  133 - 144 mmol/L Final     Potassium 12/22/2017 4.1  3.4 - 5.3 mmol/L Final     Chloride 12/22/2017 104  94 - 109 mmol/L Final     Carbon Dioxide 12/22/2017 27  20 - 32 mmol/L Final     Anion Gap 12/22/2017 8  3 - 14 mmol/L Final     Glucose 12/22/2017 204* 70 - 99 mg/dL Final     Urea Nitrogen 12/22/2017 10  7 - 30 mg/dL Final     Creatinine 12/22/2017 0.71  0.66 - 1.25 mg/dL Final     GFR Estimate 12/22/2017 >90  >60 mL/min/1.7m2 Final    Non  GFR Calc     GFR Estimate If Black 12/22/2017 >90  >60 mL/min/1.7m2 Final    African American GFR Calc     Calcium 12/22/2017 9.3  8.5 - 10.1 mg/dL Final     Creatinine Urine 12/22/2017 35  mg/dL Final     Albumin Urine mg/L 12/22/2017 <5  mg/L Final     Albumin Urine mg/g Cr 12/22/2017 Unable to calculate due to low value  0 - 17 mg/g Cr Final     Cholesterol 12/22/2017 120  <200 mg/dL Final     Triglycerides 12/22/2017 197* <150 mg/dL Final    Comment: Borderline high:  150-199 mg/dl  High:             200-499 mg/dl  Very high:       >499 mg/dl       HDL Cholesterol 12/22/2017 37* >39 mg/dL Final     LDL Cholesterol Calculated 12/22/2017 44  <100 mg/dL Final    Desirable:       <100 mg/dl     Non  HDL Cholesterol 12/22/2017 84  <130 mg/dL Final   Office Visit on 12/22/2017   Component Date Value Ref Range Status     Hemoglobin A1C 12/22/2017 7.8* 4.3 - 6.0 % Final           #2 hyperlipidemia  Patient on Lipitor at 40 mg daily. Will check lipid profile.      #3 interest in weight loss  Patient still working on dietary lifestyle changes    #4 multinodular goiter  October 2016 neck ultrasound shows a multinodular goiter with the largest nodule roughly 1.1 cm on the right (two nodules at 1.1 cm)  and the largest nodule on the left side at 1.2 cm.  Patient had declined biopsy.    Will repeat US in April 2018 - ordered    #5 right thumb pain due to de Quervain's tenosynovitis  Now resolved with recent steroid injection in May 2017.    Return visit planned in 3 months.    25 minutes spent with patient of which 20 minutes was spent in face-to-face discussion about diabetes mgmt and coordination of care

## 2017-12-26 LAB
DEPRECATED CALCIDIOL+CALCIFEROL SERPL-MC: <25 UG/L (ref 20–75)
VITAMIN D2 SERPL-MCNC: <5 UG/L
VITAMIN D3 SERPL-MCNC: 20 UG/L

## 2017-12-28 NOTE — PROGRESS NOTES
Dear Chema    Here are your labs. We will work on reducing your sugar which will help. Your thyroid, urine tests, and vitamin D were all normal.    If you have any questions, please feel free to contact my nurse at [phone] select option #3 for triage nurse  or  option #1 for scheduling related questions.    Regards    Cheri Watson MD

## 2018-01-03 DIAGNOSIS — E11.9 TYPE 2 DIABETES MELLITUS WITHOUT COMPLICATION, WITHOUT LONG-TERM CURRENT USE OF INSULIN (H): ICD-10-CM

## 2018-01-04 ENCOUNTER — OFFICE VISIT (OUTPATIENT)
Dept: OPHTHALMOLOGY | Facility: CLINIC | Age: 58
End: 2018-01-04
Payer: COMMERCIAL

## 2018-01-04 DIAGNOSIS — H52.4 MYOPIA OF BOTH EYES WITH ASTIGMATISM AND PRESBYOPIA: ICD-10-CM

## 2018-01-04 DIAGNOSIS — H52.13 MYOPIA OF BOTH EYES WITH ASTIGMATISM AND PRESBYOPIA: ICD-10-CM

## 2018-01-04 DIAGNOSIS — H52.203 MYOPIA OF BOTH EYES WITH ASTIGMATISM AND PRESBYOPIA: ICD-10-CM

## 2018-01-04 DIAGNOSIS — E11.9 TYPE 2 DIABETES MELLITUS WITHOUT COMPLICATION, WITHOUT LONG-TERM CURRENT USE OF INSULIN (H): Primary | ICD-10-CM

## 2018-01-04 ASSESSMENT — REFRACTION_MANIFEST
OS_SPHERE: -9.00
OS_CYLINDER: +1.50
OD_SPHERE: -5.00
OD_CYLINDER: +1.25
OD_AXIS: 125
OS_AXIS: 070

## 2018-01-04 ASSESSMENT — TONOMETRY
OD_IOP_MMHG: 18
OS_IOP_MMHG: 16
IOP_METHOD: ICARE

## 2018-01-04 ASSESSMENT — REFRACTION_WEARINGRX
OS_AXIS: 080
OD_ADD: +2.50
OD_AXIS: 118
OS_CYLINDER: +1.00
OS_ADD: +2.50
OS_SPHERE: -8.75
OD_SPHERE: -5.00
OD_CYLINDER: +1.00

## 2018-01-04 ASSESSMENT — VISUAL ACUITY
OD_CC: 20/20-2
CORRECTION_TYPE: GLASSES
OS_CC: 20/20-1
METHOD: SNELLEN - LINEAR

## 2018-01-04 ASSESSMENT — EXTERNAL EXAM - RIGHT EYE: OD_EXAM: NORMAL

## 2018-01-04 ASSESSMENT — CUP TO DISC RATIO
OS_RATIO: 0.3
OD_RATIO: 0.4

## 2018-01-04 ASSESSMENT — CONF VISUAL FIELD: OS_NORMAL: 1

## 2018-01-04 ASSESSMENT — SLIT LAMP EXAM - LIDS
COMMENTS: MILD DERMATOCHALASIS
COMMENTS: MILD DERMATOCHALASIS

## 2018-01-04 ASSESSMENT — EXTERNAL EXAM - LEFT EYE: OS_EXAM: NORMAL

## 2018-01-04 NOTE — LETTER
January 5, 2018       DIABETIC EYE EXAMINATION REPORT:    Cheri Watson MD  [address] [facility] [address]  [place] [address]   Sharla Wu       Name: Chema Mccullough   MRN: [id number]   Date of Service: 1/4/2018       Dear Dr. Watson,    Thank you for referring your patient Chema for his diabetic eye exam. He has type 2 diabetes with a recent A1c of 7.8. He does not check his blood sugar. He has no known history of diabetic retinopathy.    VISUAL ACUITY:    Visual Acuity (Snellen - Linear)      Right Left   Dist cc 20/20-2 20/20-1       Correction:  Glasses           Detailed Retinal Examination:  250.00 NIDDM without Eye Complications - No diabetic retinopathy either eye.     Follow up recommendations: in 1 year for reevaluation.    Thank you again for the kind referral.  If I can provide you with any additional information or be of further assistance in the future, please feel free to contact me at any time.    Sincerely,    THEODORE RichardsonO  Instructor of Ophthalmology  Dept of Ophthalmology & Visual Neuroscience  [facility]  P: [phone]  F: [phone]

## 2018-01-04 NOTE — MR AVS SNAPSHOT
After Visit Summary   1/4/2018    Chema Mccullough    MRN: [id number]           Patient Information     Date Of Birth          1960        Visit Information        Provider Department      1/4/2018 5:00 PM Lashae Hansen OD [facility]        Today's Diagnoses     Type 2 diabetes mellitus without complication, without long-term current use of insulin (H)    -  1    Myopia of both eyes with astigmatism and presbyopia           Follow-ups after your visit        Your next 10 appointments already scheduled     Feb 28, 2018  9:45 AM CST   (Arrive by 9:30 AM)   New Patient Visit with Cassandra Garcia MD   [facility] ([facility])    [address] [place] [id number] [phone]            Apr 27, 2018  9:45 AM CDT   US THYROID with UCUS2   [facility] ([facility])    [address] [place] [id number] [phone]           Please bring a list of your medicines (including vitamins, minerals and over-the-counter drugs). Also, tell your doctor about any allergies you may have. Wear comfortable clothes and leave your valuables at home.  You do not need to do anything special to prepare for your exam.  Please call the Imaging Department at your exam site with any questions.            Apr 27, 2018 11:00 AM CDT   (Arrive by 10:45 AM)   RETURN DIABETES with Cheri Watson MD   [facility] End[facility] ([facility])    [address] [place] [id number] [phone]              Who to contact     Please call your clinic at [phone] to:    Ask questions about your health    Make or cancel appointments    Discuss your medicines    Learn about your test results    Speak to your doctor   If you have compliments or concerns about an experience at your clinic, or if you wish to file a complaint, please  contact [facility] Patient Relations at [phone] or email us at [email or web address]         Additional Information About Your Visit        Sun[employer] Infoharbrianna Information     [employer] Infohart gives you secure access to your electronic health record. If you see a primary care provider, you can also send messages to your care team and make appointments. If you have questions, please call your primary care clinic.  If you do not have a primary care provider, please call [phone] and they will assist you.      Floodlight is an electronic gateway that provides easy, online access to your medical records. With Floodlight, you can request a clinic appointment, read your test results, renew a prescription or communicate with your care team.     To access your existing account, please contact your [facility] or call [phone] for assistance.        Care EveryWhere ID     This is your Care EveryWhere ID. This could be used by other organizations to access your [place] medical records  [id number]         Blood Pressure from Last 3 Encounters:   12/22/17 109/72   08/11/17 110/73   04/14/17 126/76    Weight from Last 3 Encounters:   12/22/17 85.6 kg (188 lb 12.8 oz)   12/18/17 85.7 kg (189 lb)   08/11/17 86.7 kg (191 lb 3.2 oz)              We Performed the Following     REFRACTION [[id number]]        Primary Care Provider Office Phone # Fax #    Sharla Wu [phone]       [place] [address]        Equal Access to Services     [facility] : Hadii aad ku hadasho Soomaali, waaxda luqadaha, qaybta kaalmada adeegyalubna, danny mckenzie . So [facility] [phone].    ATENCIÓN: Si habla español, tiene a castro disposición servicios gratuitos de asistencia lingüística. Llame al [phone].    We comply with applicable federal civil rights laws and [place] laws. We do not discriminate  on the basis of race, color, national origin, age, disability, sex, sexual orientation, or gender identity.            Thank you!     Thank you for choosing [facility] OPHTHALMOLOG[facility]  for your care. Our goal is always to provide you with excellent care. Hearing back from our patients is one way we can continue to improve our services. Please take a few minutes to complete the written survey that you may receive in the mail after your visit with us. Thank you!             Your Updated Medication List - Protect others around you: Learn how to safely use, store and throw away your medicines at www.disposemymeds.org.          This list is accurate as of: 1/4/18 11:59 PM.  Always use your most recent med list.                   Brand Name Dispense Instructions for use Diagnosis    aspirin 81 MG tablet     90 tablet    Take 1 tablet (81 mg) by mouth daily    Type 2 diabetes mellitus without complication, without long-term current use of insulin (H)       atorvastatin 40 MG tablet    LIPITOR    90 tablet    Take 1 tablet (40 mg) by mouth daily Take one daily    Type 2 diabetes mellitus without complication, without long-term current use of insulin (H)       glimepiride 1 MG tablet    AMARYL    90 tablet    Take 1 tablet (1 mg) by mouth every morning (before breakfast)    Type 2 diabetes mellitus without complication, without long-term current use of insulin (H)       metFORMIN 1000 MG tablet    GLUCOPHAGE    180 tablet    Take 1 tablet (1,000 mg) by mouth 2 times daily (with meals)    Type 2 diabetes mellitus without complication, without long-term current use of insulin (H)       OMEGA 3 PO      Take by mouth daily

## 2018-01-05 RX ORDER — ATORVASTATIN CALCIUM 40 MG/1
40 TABLET, FILM COATED ORAL DAILY
Qty: 90 TABLET | Refills: 3 | Status: SHIPPED | OUTPATIENT
Start: 2018-01-05 | End: 2018-04-27

## 2018-01-05 NOTE — PROGRESS NOTES
A/P  1.) Type 2 DM without ophthalmic manifestation  -Last A1c 7.8  -No previous history of retinopathy  -Reviewed effects of DM on the eyes and importance of good blood sugar control  -Monitor annually    2.) Myopia/Astig/Presbyopia OU  -Minor change in spec Rx, updated today    Monitor 1 year diabetic eye exam    I have confirmed the patient's CC, HPI and reviewed Past Medical History, Past Surgical History, Social History, Family History, Problem List, Medication List and agree with Tech note.     Lashae Hansen, THEODORE FAAO

## 2018-01-05 NOTE — TELEPHONE ENCOUNTER
metformin  Last Written Prescription Date:  11/18/16  Last Fill Quantity: 180,   # refills: 3  lipitor      Last Written Prescription Date:  11/18/16  Last Fill Quantity: 90,   # refills: 3  asa      Last Written Prescription Date:  11/18/19  Last Fill Quantity: 90,   # refills: 3  Last Office Visit : 12/22/17  Future Office visit:  4/27/18    Creatinine   Date Value Ref Range Status   12/22/2017 0.71 0.66 - 1.25 mg/dL Final   ]

## 2018-02-21 ENCOUNTER — TELEPHONE (OUTPATIENT)
Dept: DERMATOLOGY | Facility: CLINIC | Age: 58
End: 2018-02-21

## 2018-02-21 NOTE — TELEPHONE ENCOUNTER
Left message for Pt regarding upcoming ppointment on 2/28 at 9:45 am with Dr. Garcia .Reminded Pt of appointment time, and location. Also, gave Pt our clinic number to call in case he needs to cancel.

## 2018-04-27 ENCOUNTER — OFFICE VISIT (OUTPATIENT)
Dept: ENDOCRINOLOGY | Facility: CLINIC | Age: 58
End: 2018-04-27
Payer: COMMERCIAL

## 2018-04-27 ENCOUNTER — RADIANT APPOINTMENT (OUTPATIENT)
Dept: ULTRASOUND IMAGING | Facility: CLINIC | Age: 58
End: 2018-04-27
Attending: INTERNAL MEDICINE
Payer: COMMERCIAL

## 2018-04-27 VITALS
DIASTOLIC BLOOD PRESSURE: 79 MMHG | BODY MASS INDEX: 28.32 KG/M2 | SYSTOLIC BLOOD PRESSURE: 129 MMHG | WEIGHT: 191.2 LBS | HEIGHT: 69 IN | HEART RATE: 102 BPM

## 2018-04-27 DIAGNOSIS — J30.0 CHRONIC VASOMOTOR RHINITIS: Primary | ICD-10-CM

## 2018-04-27 DIAGNOSIS — E04.9 GOITER: ICD-10-CM

## 2018-04-27 DIAGNOSIS — E11.9 TYPE 2 DIABETES MELLITUS WITHOUT COMPLICATION, WITHOUT LONG-TERM CURRENT USE OF INSULIN (H): ICD-10-CM

## 2018-04-27 LAB — HBA1C MFR BLD: 6.5 % (ref 4.3–6)

## 2018-04-27 RX ORDER — ATORVASTATIN CALCIUM 40 MG/1
40 TABLET, FILM COATED ORAL DAILY
Qty: 90 TABLET | Refills: 3 | Status: SHIPPED | OUTPATIENT
Start: 2018-04-27 | End: 2018-10-26

## 2018-04-27 RX ORDER — IPRATROPIUM BROMIDE 21 UG/1
2 SPRAY, METERED NASAL 2 TIMES DAILY
Qty: 1 BOX | Refills: 3 | Status: SHIPPED | OUTPATIENT
Start: 2018-04-27 | End: 2019-08-16

## 2018-04-27 NOTE — LETTER
"4/27/2018       RE: Chema Mccullough  [address] [place] [address]     Dear Colleague,    Thank you for referring your patient, Chema Mccullough, to the [facility] ENDOCRINOLOGY at [facility]. Please see a copy of my visit note below.    #1 Diabetes.  States that he has been pre-diabetic for since 2004 He presented to his PCP who did routine blood work and found his fasting BS to be 184 with an A1c of 7.5.  This was started in the beginning of February 2009. On Metformin XR. In December 2011 it was 7.5.  In January 2013, it was 10.2. March 2013 is 9.3.  In March of 2013, I had the patient increase his metformin XR to 500 mg twice daily.June 2013 hemoglobin A1c of 8.3. January 2014 hemoglobin A1c of 8.2.  July 2014 hemoglobin A1c of 8.8.  With dietary and lifestyle changes, the patient's November 2014 hemoglobin A1c is 7.7.  April 2015 hemoglobin A1c of 8.7.  November 2015 hemoglobin A1c of 8.8.  November 2015 evaluation significant for detectable C-peptide at 3.2.  February 2016 hemoglobin A1c of 8.6.  June 2016 Hemoglobin A1c has remained stable at 8.6 so I increased the patient to metformin 2000 mg daily. August 2016 hemoglobin A1c of 8.8.  Patient now on metformin at 2000 mg daily as of July 2016.  November 2016 hemoglobin A1c of 7.6. November 2016 glucose was 194 with a C-peptide of 4.2.  April 2017 hemoglobin A1c of 8.6. August 2017 hemoglobin A1c 8.6. August 2017, the patient is noted to have particularly high blood sugars associated postprandially.    Review of systems related to diabetes  Cardiovascular: .  Currently on Lipitor.   patient report, he had a stress test in 2016 that was \"clean\" December 2017 LDL is 44.  Ophthalmology: Negative in January 2018  Neuro: Patient denies  Nephrology: Negative in December 2017  Infection: Patient denies  Immunizations: Did not discuss at current visit  Dental: Did not discuss at current visit    Interval history " "since last visit: In December 2017, because he was noted to have high blood sugars postprandially, I started the patient on Amaryl 1 mg daily in addition to his metformin 1000 milligrams twice daily.  The patient reports feeling overall better.  April 2018 hemoglobin A1c is 6.5.  He reports tolerance of the Amaryl.  January 2089 exam was unremarkable.    # 2 hyperlipidemia  On Lipitor 40 mg daily.  November 2015 LDL 75. stress test in 2016 that was unremarkable. November 2016 LDL was 57.    Interval history since last visit: Patient on Lipitor 40 mg daily.  December 2017 LDL was 44.    #3. Interest in weight loss   The patient has been working on lifestyle changes.    Interval history since last visit: Patient continuing to exercise.    #4 goiter  As noted during his evaluation in June 2016.  He was noted to have a multinodular goiter with several nodules roughly 1 cm in size. October 2016 formal neck ultrasound showed multinodular goiter, with  largest nodules on the right side at 1.1 cm in size (two nodules) and the largest nodule on the left side at 1.2 cm in size.  The patient had declined ultrasound-guided FNA in October 2016.Eval with floor ultrasound in April 2017 showed the following: Right anterior nodule 1.0 x 0.6 x 0.6 cm in size, right posterior/inferior nodule at 1.0 x 0.5 x 1.0 cm in size, left inferior nodule at 0.6 x 0.5 x 0.7 cm in size.     Interval history: April 2018 neck ultrasound shows no interval change in thyroid nodules.    #5 rhinitis  The patient reports \"runny nose' especially during the spring and summer.  He denies any fever or chills.  He reports that Sudafed helps but is interested in alternative options.    Past medical history  #1 history of smoking-quit  #2 de Quervain's tenosynovitis - improved with steroid injection in May 2017    Family history  Very strong family history of diabetes in mother, father, and sister    ROS:  General: no change in weight, noted rhinitis  CV: no " "complaints  Pulmonary: No complaints  GI: No complaints  : Reports recent rash for which he is working with urology  MsK: No complaints  Infection: Pt denies  Skin: Reports dry skin  Heme: Pt denies  Neuro: Pt denies        EXAM:  Blood pressure 129/79, pulse 102, height 1.753 m (5' 9\"), weight 86.7 kg (191 lb 3.2 oz).     GENERAL APPEARANCE: Alert and no distress  Thyroid: Palpation shows a slightly enlarged thyroid on the right side without discrete nodules.      Eval with floor ultrasound in April 2017 showed the following: Right anterior nodule 1.0 x 0.6 x 0.6 cm in size, right posterior/inferior nodule at 1.0 x 0.5 x 1.0 cm in size, left inferior nodule at 0.6 x 0.5 x 0.7 cm in size.  Comparison with formal ultrasound in 2016, these appear stable not slightly smaller.  CV: RRR without M/R/G  Lungs: CTA bilaterally  Abdomen: Soft, Nontender, non distended, positive bowel sounds   Neuro:  no focal deficits  Skin: No infection in feet   Mood: Normal   Lymph: neg in neck and supraclavicular area       EXAMINATION: Thyroid ultrasound, 4/27/2018 10:27 AM     COMPARISON: 10/10/2016.    HISTORY: Goiter    Technique: Grayscale and color ultrasound imaging of the thyroid was  performed.    Findings:    Thyroid parenchyma: Slightly heterogeneous throughout.    The right lobe of the thyroid measures: 2.5 x 2.0 x 5.9 cm     The thyroid isthmus measures: 0.3 cm     The left lobe of the thyroid measures: 2.0 x 1.9 x 5.5 cm     Right lobe:  Nodule 1:  Nodule measurement: 0.9 x 0.6 x 1.5 cm , mid pole, previously  measuring 1.0 x 0.7 x 1.2 cm  Echogenicity: Isoechoic  Consistency: Solid/spongiform  Calcifications: no  Hypervascular: Peripheral vascularity  Interval growth (>20%): no    Nodule 2:  Nodule measurement: 0.8 x 0.5 x 0.9 cm , mid pole, previously  measuring 0.6 x 0.4 x 0.9 cm  Echogenicity: Hypoechoic  Consistency: Solid/spongiform  Calcifications: no  Hypervascular: Peripheral vascularity  Interval growth (>20%): " no    Nodule 3:  Nodule measurement: 0.9 x 0.7 x 0.8 , mid/inferior pole, previously  measuring 1.1 x 0.6 x 0.8 cm  Echogenicity: Isoechoic  Consistency: Solid/spongiform  Calcifications: no  Hypervascular: no  Interval growth (>20%): no    Isthmus: No nodules.    Left Lobe:   Nodule 1:  Nodule measurement: 0.8 x 0.5 x 0.8 cm , mid pole, previously  measuring 0.8 x 0.5 x 0.8 cm  Echogenicity: Hypoechoic  Consistency: spongiform  Calcifications: no  Hypervascular: Mild, predominantly peripheral  Interval growth (>20%): no    Nodule 2:  Nodule measurement: 0.8 x 0.6 x 1.0 cm , mid pole, previously  measuring 0.6 x 0.4 x 0.9 cm  Echogenicity: Hypoechoic  Consistency: spongiform  Calcifications: no  Hypervascular: no  Interval growth (>20%): no    Nodule 3:  Nodule measurement: 0.8 x 0.6 x 0.9 cm , inferior pole, previously  measuring 1.2 x 0.6 x 1.2 cm  Echogenicity: Hypoechoic  Consistency: Solid/spongiform  Calcifications: no  Hypervascular: no  Interval growth (>20%): no             Impression             Impression: No significant change in small bilateral predominantly  spongiform thyroid nodules.    I have personally reviewed the examination and initial interpretation  and I agree with the findings.    DERRICK ADLER MD      EXAMINATION: Thyroid ultrasound, 4/27/2018 10:27 AM     COMPARISON: 10/10/2016.    HISTORY: Goiter    Technique: Grayscale and color ultrasound imaging of the thyroid was  performed.    Findings:    Thyroid parenchyma: Slightly heterogeneous throughout.    The right lobe of the thyroid measures: 2.5 x 2.0 x 5.9 cm     The thyroid isthmus measures: 0.3 cm     The left lobe of the thyroid measures: 2.0 x 1.9 x 5.5 cm     Right lobe:  Nodule 1:  Nodule measurement: 0.9 x 0.6 x 1.5 cm , mid pole, previously  measuring 1.0 x 0.7 x 1.2 cm  Echogenicity: Isoechoic  Consistency: Solid/spongiform  Calcifications: no  Hypervascular: Peripheral vascularity  Interval growth (>20%): no    Nodule  2:  Nodule measurement: 0.8 x 0.5 x 0.9 cm , mid pole, previously  measuring 0.6 x 0.4 x 0.9 cm  Echogenicity: Hypoechoic  Consistency: Solid/spongiform  Calcifications: no  Hypervascular: Peripheral vascularity  Interval growth (>20%): no    Nodule 3:  Nodule measurement: 0.9 x 0.7 x 0.8 , mid/inferior pole, previously  measuring 1.1 x 0.6 x 0.8 cm  Echogenicity: Isoechoic  Consistency: Solid/spongiform  Calcifications: no  Hypervascular: no  Interval growth (>20%): no    Isthmus: No nodules.    Left Lobe:   Nodule 1:  Nodule measurement: 0.8 x 0.5 x 0.8 cm , mid pole, previously  measuring 0.8 x 0.5 x 0.8 cm  Echogenicity: Hypoechoic  Consistency: spongiform  Calcifications: no  Hypervascular: Mild, predominantly peripheral  Interval growth (>20%): no    Nodule 2:  Nodule measurement: 0.8 x 0.6 x 1.0 cm , mid pole, previously  measuring 0.6 x 0.4 x 0.9 cm  Echogenicity: Hypoechoic  Consistency: spongiform  Calcifications: no  Hypervascular: no  Interval growth (>20%): no    Nodule 3:  Nodule measurement: 0.8 x 0.6 x 0.9 cm , inferior pole, previously  measuring 1.2 x 0.6 x 1.2 cm  Echogenicity: Hypoechoic  Consistency: Solid/spongiform  Calcifications: no  Hypervascular: no  Interval growth (>20%): no             Impression             Impression: No significant change in small bilateral predominantly  spongiform thyroid nodules.    I have personally reviewed the examination and initial interpretation  and I agree with the findings.    DERRICK ADLER MD          Assessment/Plan  #1 diabetes  He continues on metformin at 2000 mg daily.  Given his November 2016 glucose of 194 and C-peptide of 4.2, I'm concerned that he may have a component of beta cell failure.  However, he does continue to make insulin as indicated by the positive C-peptide.  Due to August 2017 CG MS results showing high sugars postprandially, I started the patient on Amaryl 1 mg daily in December 2017.    Patient is currently on Amaryl 1 mg daily  along with metformin 1000 mg twice daily.  HbA1c has dramatically improved to 6.5.  He feels fantastic.  We will continue with current program.    #2 hyperlipidemia  Patient on Lipitor at 40 mg daily.      #3 interest in weight loss  Patient to continue with dietary lifestyle changes.    #4 multinodular goiter  October 2016 neck ultrasound shows a multinodular goiter with the largest nodule roughly 1.1 cm on the right (two nodules at 1.1 cm)  and the largest nodule on the left side at 1.2 cm.  April 2018 ultrasound of neck personally reviewed with patient.  This essentially show no interval change.    #5 rhinitis  Patient given prescription for Atrovent nasal spray.    Patient doing well.  Return visit planned in 6 months.      Sincerely,    Cheri Watson MD

## 2018-04-27 NOTE — LETTER
Patient:  Chema Mccullough  :   1960  MRN:     [id number]        Mr.Georgios Mccullough  [address] [place] [address]        2018    Dear Chema    Here is your neck US. You continue to have some small nodules which are unchanged in size compared with the previous imaging. We will continue to watch.    If you have any questions, please feel free to contact my nurse at [phone] select option #3 for triage nurse  or  option #1 for scheduling related questions.    Regards    Cheri Watson MD        Resulted Orders   US Thyroid    Narrative    EXAMINATION: Thyroid ultrasound, 2018 10:27 AM     COMPARISON: 10/10/2016.    HISTORY: Goiter    Technique: Grayscale and color ultrasound imaging of the thyroid was  performed.    Findings:    Thyroid parenchyma: Slightly heterogeneous throughout.    The right lobe of the thyroid measures: 2.5 x 2.0 x 5.9 cm     The thyroid isthmus measures: 0.3 cm     The left lobe of the thyroid measures: 2.0 x 1.9 x 5.5 cm     Right lobe:  Nodule 1:  Nodule measurement: 0.9 x 0.6 x 1.5 cm , mid pole, previously  measuring 1.0 x 0.7 x 1.2 cm  Echogenicity: Isoechoic  Consistency: Solid/spongiform  Calcifications: no  Hypervascular: Peripheral vascularity  Interval growth (>20%): no    Nodule 2:  Nodule measurement: 0.8 x 0.5 x 0.9 cm , mid pole, previously  measuring 0.6 x 0.4 x 0.9 cm  Echogenicity: Hypoechoic  Consistency: Solid/spongiform  Calcifications: no  Hypervascular: Peripheral vascularity  Interval growth (>20%): no    Nodule 3:  Nodule measurement: 0.9 x 0.7 x 0.8 , mid/inferior pole, previously  measuring 1.1 x 0.6 x 0.8 cm  Echogenicity: Isoechoic  Consistency: Solid/spongiform  Calcifications: no  Hypervascular: no  Interval growth (>20%): no    Isthmus: No nodules.    Left Lobe:   Nodule 1:  Nodule measurement: 0.8 x 0.5 x 0.8 cm , mid pole, previously  measuring 0.8 x 0.5 x 0.8 cm  Echogenicity: Hypoechoic  Consistency:  spongiform  Calcifications: no  Hypervascular: Mild, predominantly peripheral  Interval growth (>20%): no    Nodule 2:  Nodule measurement: 0.8 x 0.6 x 1.0 cm , mid pole, previously  measuring 0.6 x 0.4 x 0.9 cm  Echogenicity: Hypoechoic  Consistency: spongiform  Calcifications: no  Hypervascular: no  Interval growth (>20%): no    Nodule 3:  Nodule measurement: 0.8 x 0.6 x 0.9 cm , inferior pole, previously  measuring 1.2 x 0.6 x 1.2 cm  Echogenicity: Hypoechoic  Consistency: Solid/spongiform  Calcifications: no  Hypervascular: no  Interval growth (>20%): no      Impression    Impression: No significant change in small bilateral predominantly  spongiform thyroid nodules.    I have personally reviewed the examination and initial interpretation  and I agree with the findings.    MD BELKIS JOHNSON [facility] IMAGING [place] ULTRASOUND ROOM 2

## 2018-04-27 NOTE — NURSING NOTE
"Chief Complaint   Patient presents with     RECHECK     DIABETES F/U        Initial /79 (BP Location: Right arm)  Pulse 102  Ht 1.753 m (5' 9\")  Wt 86.7 kg (191 lb 3.2 oz)  BMI 28.24 kg/m2 Estimated body mass index is 28.24 kg/(m^2) as calculated from the following:    Height as of this encounter: 1.753 m (5' 9\").    Weight as of this encounter: 86.7 kg (191 lb 3.2 oz).  Medication Reconciliation: completecomplete    Performed A1C test - patient tolerated well.    Katerina Bautista, CMA       "

## 2018-04-27 NOTE — MR AVS SNAPSHOT
After Visit Summary   4/27/2018    Chema Mccullough    MRN: [id number]           Patient Information     Date Of Birth          1960        Visit Information        Provider Department      4/27/2018 11:00 AM Cheri Watson MD [facility]        Today's Diagnoses     Chronic vasomotor rhinitis    -  1    Type 2 diabetes mellitus without complication, without long-term current use of insulin (H)          Care Instructions    Pt to consider vanicream lite to skin for itching    Consider rowing          Follow-ups after your visit        Follow-up notes from your care team     Return in about 6 months (around 10/27/2018).      Your next 10 appointments already scheduled     Apr 30, 2018  8:30 AM CDT   (Arrive by 8:15 AM)   New Patient Visit with Aida Coelho PA-C   [facility] ([facility])    [address]  [address] [place] [address] [phone]            Oct 26, 2018  9:30 AM CDT   (Arrive by 9:15 AM)   RETURN DIABETES with Cheri Watson MD   [facility] ([facility])    [address]  [address] [place] [address] [phone]              Who to contact     Please call your clinic at [phone] to:    Ask questions about your health    Make or cancel appointments    Discuss your medicines    Learn about your test results    Speak to your doctor            Additional Information About Your Visit        MyChart Information     [employer] gives you secure access to your electronic health record. If you see a primary care provider, you can also send messages to your care team and make appointments. If you have questions, please call your primary care clinic.  If you do not have a primary care provider, please call [phone] and they will assist you.      FAD ? IO is an electronic gateway that provides easy, online access to your  "medical records. With Spring, you can request a clinic appointment, read your test results, renew a prescription or communicate with your care team.     To access your existing account, please contact your [facility] or call [phone] for assistance.        Care EveryWhere ID     This is your Care EveryWhere ID. This could be used by other organizations to access your [facility] medical records  [id number]        Your Vitals Were     Pulse Height BMI (Body Mass Index)             102 1.753 m (5' 9\") 28.24 kg/m2          Blood Pressure from Last 3 Encounters:   04/27/18 129/79   12/22/17 109/72   08/11/17 110/73    Weight from Last 3 Encounters:   04/27/18 86.7 kg (191 lb 3.2 oz)   12/22/17 85.6 kg (188 lb 12.8 oz)   12/18/17 85.7 kg (189 lb)              Today, you had the following     No orders found for display         Today's Medication Changes          These changes are accurate as of 4/27/18  2:06 PM.  If you have any questions, ask your nurse or doctor.               Start taking these medicines.        Dose/Directions    ipratropium 0.03 % spray   Commonly known as:  ATROVENT   Used for:  Chronic vasomotor rhinitis   Started by:  Cheri Watson MD        Dose:  2 spray   Spray 2 sprays into both nostrils 2 times daily   Quantity:  1 Box   Refills:  3         These medicines have changed or have updated prescriptions.        Dose/Directions    metFORMIN 1000 MG tablet   Commonly known as:  GLUCOPHAGE   This may have changed:  See the new instructions.   Used for:  Type 2 diabetes mellitus without complication, without long-term current use of insulin (H)   Changed by:  Cheri Watson MD        Dose:  1000 mg   Take 1 tablet (1,000 mg) by mouth 2 times daily (with meals)   Quantity:  180 tablet   Refills:  3            Where to get your medicines      These medications were sent to [facility] [address] [place], [place] - [address]  [address] " [place] [address]     Phone:  [phone]     aspirin 81 MG tablet    atorvastatin 40 MG tablet    ipratropium 0.03 % spray    metFORMIN 1000 MG tablet                Primary Care Provider Office Phone # Fax #    Sharla Wu [phone]       [address] [address]        Equal Access to Services     [facility]: Hadii aad ku hadasho Soomaali, waaxda luqadaha, qaybta kaalmada adeegyada, waxay idiin hayaan adeeg khflorecitash la'aan ah. So [facility] [phone].    ATENCIÓN: Si habla español, tiene a castro disposición servicios gratuitos de asistencia lingüística. Kaleb al [phone].    We comply with applicable federal civil rights laws and [place] laws. We do not discriminate on the basis of race, color, national origin, age, disability, sex, sexual orientation, or gender identity.            Thank you!     Thank you for choosing [facility] ENDOCRINOLOGY  for your care. Our goal is always to provide you with excellent care. Hearing back from our patients is one way we can continue to improve our services. Please take a few minutes to complete the written survey that you may receive in the mail after your visit with us. Thank you!             Your Updated Medication List - Protect others around you: Learn how to safely use, store and throw away your medicines at www.disposemymeds.org.          This list is accurate as of 4/27/18  2:06 PM.  Always use your most recent med list.                   Brand Name Dispense Instructions for use Diagnosis    aspirin 81 MG tablet     90 tablet    Take 1 tablet (81 mg) by mouth daily    Type 2 diabetes mellitus without complication, without long-term current use of insulin (H)       atorvastatin 40 MG tablet    LIPITOR    90 tablet    Take 1 tablet (40 mg) by mouth daily Take one daily    Type 2 diabetes mellitus without complication, without long-term current use of insulin (H)       glimepiride 1 MG tablet    AMARYL     90 tablet    Take 1 tablet (1 mg) by mouth every morning (before breakfast)    Type 2 diabetes mellitus without complication, without long-term current use of insulin (H)       ipratropium 0.03 % spray    ATROVENT    1 Box    Spray 2 sprays into both nostrils 2 times daily    Chronic vasomotor rhinitis       MAGNESIUM PO      Pt. Unsure of dosage        metFORMIN 1000 MG tablet    GLUCOPHAGE    180 tablet    Take 1 tablet (1,000 mg) by mouth 2 times daily (with meals)    Type 2 diabetes mellitus without complication, without long-term current use of insulin (H)       OMEGA 3 PO      Take by mouth daily

## 2018-04-27 NOTE — PROGRESS NOTES
Dear Chema    Here is your neck US. You continue to have some small nodules which are unchanged in size compared with the previous imaging. We will continue to watch.    If you have any questions, please feel free to contact my nurse at [phone] select option #3 for triage nurse  or  option #1 for scheduling related questions.    Regards    Cheri Watson MD

## 2018-04-27 NOTE — PROGRESS NOTES
"#1 Diabetes.  States that he has been pre-diabetic for since 2004 He presented to his PCP who did routine blood work and found his fasting BS to be 184 with an A1c of 7.5.  This was started in the beginning of February 2009. On Metformin XR. In December 2011 it was 7.5.  In January 2013, it was 10.2. March 2013 is 9.3.  In March of 2013, I had the patient increase his metformin XR to 500 mg twice daily.June 2013 hemoglobin A1c of 8.3. January 2014 hemoglobin A1c of 8.2.  July 2014 hemoglobin A1c of 8.8.  With dietary and lifestyle changes, the patient's November 2014 hemoglobin A1c is 7.7.  April 2015 hemoglobin A1c of 8.7.  November 2015 hemoglobin A1c of 8.8.  November 2015 evaluation significant for detectable C-peptide at 3.2.  February 2016 hemoglobin A1c of 8.6.  June 2016 Hemoglobin A1c has remained stable at 8.6 so I increased the patient to metformin 2000 mg daily. August 2016 hemoglobin A1c of 8.8.  Patient now on metformin at 2000 mg daily as of July 2016.  November 2016 hemoglobin A1c of 7.6. November 2016 glucose was 194 with a C-peptide of 4.2.  April 2017 hemoglobin A1c of 8.6. August 2017 hemoglobin A1c 8.6. August 2017, the patient is noted to have particularly high blood sugars associated postprandially.    Review of systems related to diabetes  Cardiovascular: .  Currently on Lipitor.   patient report, he had a stress test in 2016 that was \"clean\" December 2017 LDL is 44.  Ophthalmology: Negative in January 2018  Neuro: Patient denies  Nephrology: Negative in December 2017  Infection: Patient denies  Immunizations: Did not discuss at current visit  Dental: Did not discuss at current visit    Interval history since last visit: In December 2017, because he was noted to have high blood sugars postprandially, I started the patient on Amaryl 1 mg daily in addition to his metformin 1000 milligrams twice daily.  The patient reports feeling overall better.  April 2018 hemoglobin A1c is 6.5.  He reports " "tolerance of the Amaryl.  January 2089 exam was unremarkable.    # 2 hyperlipidemia  On Lipitor 40 mg daily.  November 2015 LDL 75. stress test in 2016 that was unremarkable. November 2016 LDL was 57.    Interval history since last visit: Patient on Lipitor 40 mg daily.  December 2017 LDL was 44.    #3. Interest in weight loss   The patient has been working on lifestyle changes.    Interval history since last visit: Patient continuing to exercise.    #4 goiter  As noted during his evaluation in June 2016.  He was noted to have a multinodular goiter with several nodules roughly 1 cm in size. October 2016 formal neck ultrasound showed multinodular goiter, with  largest nodules on the right side at 1.1 cm in size (two nodules) and the largest nodule on the left side at 1.2 cm in size.  The patient had declined ultrasound-guided FNA in October 2016.Eval with floor ultrasound in April 2017 showed the following: Right anterior nodule 1.0 x 0.6 x 0.6 cm in size, right posterior/inferior nodule at 1.0 x 0.5 x 1.0 cm in size, left inferior nodule at 0.6 x 0.5 x 0.7 cm in size.     Interval history: April 2018 neck ultrasound shows no interval change in thyroid nodules.    #5 rhinitis  The patient reports \"runny nose' especially during the spring and summer.  He denies any fever or chills.  He reports that Sudafed helps but is interested in alternative options.    Past medical history  #1 history of smoking-quit  #2 de Quervain's tenosynovitis - improved with steroid injection in May 2017    Family history  Very strong family history of diabetes in mother, father, and sister    ROS:  General: no change in weight, noted rhinitis  CV: no complaints  Pulmonary: No complaints  GI: No complaints  : Reports recent rash for which he is working with urology  MsK: No complaints  Infection: Pt denies  Skin: Reports dry skin  Heme: Pt denies  Neuro: Pt denies        EXAM:  Blood pressure 129/79, pulse 102, height 1.753 m (5' 9\"), " weight 86.7 kg (191 lb 3.2 oz).     GENERAL APPEARANCE: Alert and no distress  Thyroid: Palpation shows a slightly enlarged thyroid on the right side without discrete nodules.      Eval with floor ultrasound in April 2017 showed the following: Right anterior nodule 1.0 x 0.6 x 0.6 cm in size, right posterior/inferior nodule at 1.0 x 0.5 x 1.0 cm in size, left inferior nodule at 0.6 x 0.5 x 0.7 cm in size.  Comparison with formal ultrasound in 2016, these appear stable not slightly smaller.  CV: RRR without M/R/G  Lungs: CTA bilaterally  Abdomen: Soft, Nontender, non distended, positive bowel sounds   Neuro:  no focal deficits  Skin: No infection in feet   Mood: Normal   Lymph: neg in neck and supraclavicular area       EXAMINATION: Thyroid ultrasound, 4/27/2018 10:27 AM     COMPARISON: 10/10/2016.    HISTORY: Goiter    Technique: Grayscale and color ultrasound imaging of the thyroid was  performed.    Findings:    Thyroid parenchyma: Slightly heterogeneous throughout.    The right lobe of the thyroid measures: 2.5 x 2.0 x 5.9 cm     The thyroid isthmus measures: 0.3 cm     The left lobe of the thyroid measures: 2.0 x 1.9 x 5.5 cm     Right lobe:  Nodule 1:  Nodule measurement: 0.9 x 0.6 x 1.5 cm , mid pole, previously  measuring 1.0 x 0.7 x 1.2 cm  Echogenicity: Isoechoic  Consistency: Solid/spongiform  Calcifications: no  Hypervascular: Peripheral vascularity  Interval growth (>20%): no    Nodule 2:  Nodule measurement: 0.8 x 0.5 x 0.9 cm , mid pole, previously  measuring 0.6 x 0.4 x 0.9 cm  Echogenicity: Hypoechoic  Consistency: Solid/spongiform  Calcifications: no  Hypervascular: Peripheral vascularity  Interval growth (>20%): no    Nodule 3:  Nodule measurement: 0.9 x 0.7 x 0.8 , mid/inferior pole, previously  measuring 1.1 x 0.6 x 0.8 cm  Echogenicity: Isoechoic  Consistency: Solid/spongiform  Calcifications: no  Hypervascular: no  Interval growth (>20%): no    Isthmus: No nodules.    Left Lobe:   Nodule  1:  Nodule measurement: 0.8 x 0.5 x 0.8 cm , mid pole, previously  measuring 0.8 x 0.5 x 0.8 cm  Echogenicity: Hypoechoic  Consistency: spongiform  Calcifications: no  Hypervascular: Mild, predominantly peripheral  Interval growth (>20%): no    Nodule 2:  Nodule measurement: 0.8 x 0.6 x 1.0 cm , mid pole, previously  measuring 0.6 x 0.4 x 0.9 cm  Echogenicity: Hypoechoic  Consistency: spongiform  Calcifications: no  Hypervascular: no  Interval growth (>20%): no    Nodule 3:  Nodule measurement: 0.8 x 0.6 x 0.9 cm , inferior pole, previously  measuring 1.2 x 0.6 x 1.2 cm  Echogenicity: Hypoechoic  Consistency: Solid/spongiform  Calcifications: no  Hypervascular: no  Interval growth (>20%): no             Impression             Impression: No significant change in small bilateral predominantly  spongiform thyroid nodules.    I have personally reviewed the examination and initial interpretation  and I agree with the findings.    DERRICK ADLER MD      EXAMINATION: Thyroid ultrasound, 4/27/2018 10:27 AM     COMPARISON: 10/10/2016.    HISTORY: Goiter    Technique: Grayscale and color ultrasound imaging of the thyroid was  performed.    Findings:    Thyroid parenchyma: Slightly heterogeneous throughout.    The right lobe of the thyroid measures: 2.5 x 2.0 x 5.9 cm     The thyroid isthmus measures: 0.3 cm     The left lobe of the thyroid measures: 2.0 x 1.9 x 5.5 cm     Right lobe:  Nodule 1:  Nodule measurement: 0.9 x 0.6 x 1.5 cm , mid pole, previously  measuring 1.0 x 0.7 x 1.2 cm  Echogenicity: Isoechoic  Consistency: Solid/spongiform  Calcifications: no  Hypervascular: Peripheral vascularity  Interval growth (>20%): no    Nodule 2:  Nodule measurement: 0.8 x 0.5 x 0.9 cm , mid pole, previously  measuring 0.6 x 0.4 x 0.9 cm  Echogenicity: Hypoechoic  Consistency: Solid/spongiform  Calcifications: no  Hypervascular: Peripheral vascularity  Interval growth (>20%): no    Nodule 3:  Nodule measurement: 0.9 x 0.7 x 0.8 ,  mid/inferior pole, previously  measuring 1.1 x 0.6 x 0.8 cm  Echogenicity: Isoechoic  Consistency: Solid/spongiform  Calcifications: no  Hypervascular: no  Interval growth (>20%): no    Isthmus: No nodules.    Left Lobe:   Nodule 1:  Nodule measurement: 0.8 x 0.5 x 0.8 cm , mid pole, previously  measuring 0.8 x 0.5 x 0.8 cm  Echogenicity: Hypoechoic  Consistency: spongiform  Calcifications: no  Hypervascular: Mild, predominantly peripheral  Interval growth (>20%): no    Nodule 2:  Nodule measurement: 0.8 x 0.6 x 1.0 cm , mid pole, previously  measuring 0.6 x 0.4 x 0.9 cm  Echogenicity: Hypoechoic  Consistency: spongiform  Calcifications: no  Hypervascular: no  Interval growth (>20%): no    Nodule 3:  Nodule measurement: 0.8 x 0.6 x 0.9 cm , inferior pole, previously  measuring 1.2 x 0.6 x 1.2 cm  Echogenicity: Hypoechoic  Consistency: Solid/spongiform  Calcifications: no  Hypervascular: no  Interval growth (>20%): no             Impression             Impression: No significant change in small bilateral predominantly  spongiform thyroid nodules.    I have personally reviewed the examination and initial interpretation  and I agree with the findings.    DERRICK ADLER MD          Assessment/Plan  #1 diabetes  He continues on metformin at 2000 mg daily.  Given his November 2016 glucose of 194 and C-peptide of 4.2, I'm concerned that he may have a component of beta cell failure.  However, he does continue to make insulin as indicated by the positive C-peptide.  Due to August 2017 CG MS results showing high sugars postprandially, I started the patient on Amaryl 1 mg daily in December 2017.    Patient is currently on Amaryl 1 mg daily along with metformin 1000 mg twice daily.  HbA1c has dramatically improved to 6.5.  He feels fantastic.  We will continue with current program.    #2 hyperlipidemia  Patient on Lipitor at 40 mg daily.      #3 interest in weight loss  Patient to continue with dietary lifestyle changes.    #4  multinodular goiter  October 2016 neck ultrasound shows a multinodular goiter with the largest nodule roughly 1.1 cm on the right (two nodules at 1.1 cm)  and the largest nodule on the left side at 1.2 cm.  April 2018 ultrasound of neck personally reviewed with patient.  This essentially show no interval change.    #5 rhinitis  Patient given prescription for Atrovent nasal spray.    Patient doing well.  Return visit planned in 6 months.

## 2018-10-23 ENCOUNTER — PATIENT OUTREACH (OUTPATIENT)
Dept: CARE COORDINATION | Facility: CLINIC | Age: 58
End: 2018-10-23

## 2018-10-26 ENCOUNTER — OFFICE VISIT (OUTPATIENT)
Dept: ENDOCRINOLOGY | Facility: CLINIC | Age: 58
End: 2018-10-26
Payer: COMMERCIAL

## 2018-10-26 VITALS
DIASTOLIC BLOOD PRESSURE: 74 MMHG | HEIGHT: 69 IN | SYSTOLIC BLOOD PRESSURE: 115 MMHG | BODY MASS INDEX: 28.29 KG/M2 | HEART RATE: 88 BPM | WEIGHT: 191 LBS

## 2018-10-26 DIAGNOSIS — E11.9 TYPE 2 DIABETES MELLITUS WITHOUT COMPLICATION, WITHOUT LONG-TERM CURRENT USE OF INSULIN (H): Primary | ICD-10-CM

## 2018-10-26 LAB — HBA1C MFR BLD: 6.4 % (ref 4.3–6)

## 2018-10-26 RX ORDER — GLIMEPIRIDE 1 MG/1
1 TABLET ORAL
Qty: 90 TABLET | Refills: 3 | Status: SHIPPED | OUTPATIENT
Start: 2018-10-26 | End: 2019-08-16

## 2018-10-26 RX ORDER — ATORVASTATIN CALCIUM 40 MG/1
TABLET, FILM COATED ORAL
Qty: 90 TABLET | Refills: 3 | COMMUNITY
Start: 2018-10-26 | End: 2019-08-16

## 2018-10-26 ASSESSMENT — PAIN SCALES - GENERAL: PAINLEVEL: NO PAIN (0)

## 2018-10-26 NOTE — LETTER
10/26/2018       RE: Chema Mccullough  [address] [place] [address]     Dear Colleague,    Thank you for referring your patient, Chema Mccullough, to the [facility] ENDOCRINOLOGY at [facility]. Please see a copy of my visit note below.    [facility]  Endocrinology  Cheri Watson MD  10/26/2018      Chief Complaint:   Diabetes    History of Present Illness:   Chema Mccullough is a 58 year old male with a history of type 2 diabetes who presents for follow up.    Type 2 diabetes  The patient was diagnosed with prediabetes in 2004 and then in 2009 found to have fasting blood sugar of 184 with A1c of 7.5%.  He was started on Metformin XR in February 2009, dose increased to 500 mg twice daily in March 2013.  A1c from 2013 - 2016 was consistently in the 8% range.  November 2015 evaluation significant for detectable C-peptide of 3.2.  June 2016 his Metformin was increased to 2000 mg daily.  December 2017 he was started on Amaryl 1 mg daily as he was experiencing postprandial hyperglycemia.  April 2018 hemoglobin A1c was 6.5%.    Interval history:  August 2018 hemoglobin A1c at his primary care physicians office was 6.6%.  October 2018 hemoglobin A1c here is 6.4%.  He feels well.  He has stopped eating later a night and is exercising 35 - 50 minutes per day, either treadmill, reclining bike, or rowing.  He consistently hits 10,000 steps in the summer.  In winter months, he usually gets around 8,000 steps.  He is often traveling and will be leaving for [place] soon.  August 2018 PSA was 0.8, creatinine was 0.75, hemoglobin A1c of 6.6, urine for microalbumin was negative, and LDL was 78.    Diabetes monitoring and complications:  CAD: No, on Lipitor, stress test in 2016 was unremarkable   Last eye exam results: 01/04/2018, no diabetic retinopathy   Last dental exam: not discussed  Microalbuminuria: negative in August 2018  HTN: No  On Statin: Yes  On Aspirin: Yes    #2  Hyperlipidemia  On Lipitor 40 mg daily.  Stress test in 2016 was unremarkable.  December 2017 LDL was 44.    Interval history:  He has switched his Lipitor to every other day.  August 2018 LDL was 78    #3 Goiter  Patient was noted to have a multinodular goiter with several nodules roughly 1 cm in size during his evaluation in June 2016.  Formal neck ultrasound October 2016 showed multinodular goiter with largest nodules on the right side at 1.1 in size (2 nodules) and the largest nodule on the left side at 1.2 cm.  Patient declined ultrasound-guided FNA in October 2016.  Floor ultrasound in April 2017 showed right anterior nodule 1 x 0.6 x 0.6 cm in size, right posterior/inferior nodule at 1 x 0.5 x 1 cm in size, left inferior nodule 0.6 x 0.5 x 0.7 cm in size.  April 2018 neck ultrasound showed no interval change.  December 2017 TSH was 0.72    Review of Systems:   Pertinent items are noted in HPI.  All other systems are negative.    Active Medications:      aspirin 81 MG tablet, Take 1 tablet (81 mg) by mouth daily, Disp: 90 tablet, Rfl: 3     atorvastatin (LIPITOR) 40 MG tablet, Take 1 tablet (40 mg) by mouth daily Take one daily, Disp: 90 tablet, Rfl: 3     glimepiride (AMARYL) 1 MG tablet, Take 1 tablet (1 mg) by mouth every morning (before breakfast), Disp: 90 tablet, Rfl: 3     ipratropium (ATROVENT) 0.03 % spray, Spray 2 sprays into both nostrils 2 times daily, Disp: 1 Box, Rfl: 3     MAGNESIUM PO, Pt. Unsure of dosage, Disp: , Rfl:      metFORMIN (GLUCOPHAGE) 1000 MG tablet, Take 1 tablet (1,000 mg) by mouth 2 times daily (with meals), Disp: 180 tablet, Rfl: 3     Omega-3 Fatty Acids (OMEGA 3 PO), Take by mouth daily, Disp: , Rfl:      Allergies:   No known drug allergies.       Past Medical History:  Diabetes mellitus   Goiter      Past Surgical History:  History reviewed. No pertinent past surgical history.     Family History:   Diabetes - mother, father, sister     Social History:   Marital Status:  "   Presents to clinic alone  Tobacco Use: Former smoker, quit 1978.   Alcohol Use: No alcohol use.   PCP: Sharla Wu      Physical Exam:   /74  Pulse 88  Ht 1.753 m (5' 9\")  Wt 86.6 kg (191 lb)  BMI 28.21 kg/m2     Wt Readings from Last 10 Encounters:   10/26/18 86.6 kg (191 lb)   04/27/18 86.7 kg (191 lb 3.2 oz)   12/22/17 85.6 kg (188 lb 12.8 oz)   12/18/17 85.7 kg (189 lb)   08/11/17 86.7 kg (191 lb 3.2 oz)   05/09/17 86.6 kg (191 lb)   04/14/17 85.4 kg (188 lb 4.8 oz)   11/18/16 85.7 kg (189 lb)   08/19/16 87.1 kg (192 lb)   06/02/16 87.5 kg (193 lb)      GENERAL APPEARANCE: Alert and no distress  NECK: No lymphadenopathy appreciated  Thyroid: No obvious nodules palpated   CV: RRR without M/R/G  Lungs: CTA bilaterally  Abdomen: Soft, Nontender, non distended, positive bowel sounds   Neuro: no focal deficits  Skin: No infection in feet   Diabetic foot exam: normal DP and PT pulses, no trophic changes or ulcerative lesions and normal monofilament exam  Mood: Normal   Lymph: neg in neck and supraclavicular area       Data:  Lab Results   Component Value Date     12/22/2017    POTASSIUM 4.1 12/22/2017    CHLORIDE 104 12/22/2017    CO2 27 12/22/2017    ANIONGAP 8 12/22/2017     (H) 12/22/2017    BUN 10 12/22/2017    CR 0.71 12/22/2017    MOUNIKA 9.3 12/22/2017     Lab Results   Component Value Date    GFRESTIMATED >90 12/22/2017    GFRESTIMATED >90  Non  GFR Calc   11/18/2016    GFRESTIMATED >90  Non  GFR Calc   11/12/2015    GFRESTBLACK >90 12/22/2017    GFRESTBLACK >90   GFR Calc   11/18/2016    GFRESTBLACK >90   GFR Calc   11/12/2015      Lab Results   Component Value Date    MICROL <5 12/22/2017    UMALCR Unable to calculate due to low value 12/22/2017      Lab Results   Component Value Date    A1C 8.3 (A) 06/07/2013    A1C 7.5 (A) 12/23/2011    HEMOGLOBINA1 6.4 (A) 10/26/2018    HEMOGLOBINA1 6.5 (A) 04/27/2018    " HEMOGLOBINA1 7.8 (A) 12/22/2017    HEMOGLOBINA1 8.6 (A) 08/11/2017    HEMOGLOBINA1 8.6 (A) 04/14/2017     Lab Results   Component Value Date    CPEPT 4.2 11/18/2016     Lab Results   Component Value Date    CHOL 120 12/22/2017    CHOL 118 11/18/2016    TRIG 197 (H) 12/22/2017    TRIG 129 11/18/2016    HDL 37 (L) 12/22/2017    HDL 35 (L) 11/18/2016    LDL 44 12/22/2017    LDL 57 11/18/2016    NHDL 84 12/22/2017    NHDL 83 11/18/2016     Assessment and Plan:  #1 Type 2 diabetes mellitus without complication, without long-term current use of insulin (H)  Hemoglobin A1c now excellent at 6.4%.  Continue Metformin 2000 mg daily and Glimepiride 1 mg daily.  He has made good lifestyle changes.  Discussed intermittent fasting when traveling for continued weight loss.  He is due for an eye exam in February 2019 and this has been ordered.  - Hemoglobin A1c POCT  - atorvastatin (LIPITOR) 40 MG tablet  Dispense: 90 tablet; Refill: 3  - glimepiride (AMARYL) 1 MG tablet  Dispense: 90 tablet; Refill: 3  - metFORMIN (GLUCOPHAGE) 1000 MG tablet  Dispense: 180 tablet; Refill: 3  - aspirin 81 MG tablet  Dispense: 90 tablet; Refill: 3      #2 Hyperlipidemia   Patient is on Lipitor 40 mg every other day.  Refills given.  August 2018 LDL was reasonable at 78.  Patient is not interested in Lipitor 20 mg daily.    #3 Multinodular Goiter  No obvious nodules appreciated today.  December 2017 TSH was normal.  Most recent neck ultrasound in April 2018 showed no significant interval change.  Will plan to repeat this next year.     Follow up: in 6 months    >50% of 30 minute visit spent in face to face counseling, education and coordination of care related to options for better glycemic control as well as preventing, detecting, and treating hypoglycemia.         Scribe Disclosure:   I, Lulu Ibarra, am serving as a scribe to document services personally performed by Cheri Watson MD at this visit, based upon the provider's statements to me. All  documentation has been reviewed by the aforementioned provider prior to being entered into the official medical record.     Portions of this medical record were completed by a scribe. UPON MY REVIEW AND AUTHENTICATION BY ELECTRONIC SIGNATURE, this confirms (a) I performed the applicable clinical services, and (b) the record is accurate.       Again, thank you for allowing me to participate in the care of your patient.      Sincerely,    Cheri Watson MD

## 2018-10-26 NOTE — PROGRESS NOTES
[facility]  Endocrinology  Cheri Watson MD  10/26/2018      Chief Complaint:   Diabetes    History of Present Illness:   Chema Mccullough is a 58 year old male with a history of type 2 diabetes who presents for follow up.    Type 2 diabetes  The patient was diagnosed with prediabetes in 2004 and then in 2009 found to have fasting blood sugar of 184 with A1c of 7.5%.  He was started on Metformin XR in February 2009, dose increased to 500 mg twice daily in March 2013.  A1c from 2013 - 2016 was consistently in the 8% range.  November 2015 evaluation significant for detectable C-peptide of 3.2.  June 2016 his Metformin was increased to 2000 mg daily.  December 2017 he was started on Amaryl 1 mg daily as he was experiencing postprandial hyperglycemia.  April 2018 hemoglobin A1c was 6.5%.    Interval history:  August 2018 hemoglobin A1c at his primary care physicians office was 6.6%.  October 2018 hemoglobin A1c here is 6.4%.  He feels well.  He has stopped eating later a night and is exercising 35 - 50 minutes per day, either treadmill, reclining bike, or rowing.  He consistently hits 10,000 steps in the summer.  In winter months, he usually gets around 8,000 steps.  He is often traveling and will be leaving for [place] [employer] soon.  August 2018 PSA was 0.8, creatinine was 0.75, hemoglobin A1c of 6.6, urine for microalbumin was negative, and LDL was 78.    Diabetes monitoring and complications:  CAD: No, on Lipitor, stress test in 2016 was unremarkable   Last eye exam results: 01/04/2018, no diabetic retinopathy   Last dental exam: not discussed  Microalbuminuria: negative in August 2018  HTN: No  On Statin: Yes  On Aspirin: Yes    #2 Hyperlipidemia  On Lipitor 40 mg daily.  Stress test in 2016 was unremarkable.  December 2017 LDL was 44.    Interval history:  He has switched his Lipitor to every other day.  August 2018 LDL was 78    #3 Goiter  Patient was noted to have a multinodular goiter with several nodules roughly 1 cm  "in size during his evaluation in June 2016.  Formal neck ultrasound October 2016 showed multinodular goiter with largest nodules on the right side at 1.1 in size (2 nodules) and the largest nodule on the left side at 1.2 cm.  Patient declined ultrasound-guided FNA in October 2016.  Floor ultrasound in April 2017 showed right anterior nodule 1 x 0.6 x 0.6 cm in size, right posterior/inferior nodule at 1 x 0.5 x 1 cm in size, left inferior nodule 0.6 x 0.5 x 0.7 cm in size.  April 2018 neck ultrasound showed no interval change.  December 2017 TSH was 0.72    Review of Systems:   Pertinent items are noted in HPI.  All other systems are negative.    Active Medications:      aspirin 81 MG tablet, Take 1 tablet (81 mg) by mouth daily, Disp: 90 tablet, Rfl: 3     atorvastatin (LIPITOR) 40 MG tablet, Take 1 tablet (40 mg) by mouth daily Take one daily, Disp: 90 tablet, Rfl: 3     glimepiride (AMARYL) 1 MG tablet, Take 1 tablet (1 mg) by mouth every morning (before breakfast), Disp: 90 tablet, Rfl: 3     ipratropium (ATROVENT) 0.03 % spray, Spray 2 sprays into both nostrils 2 times daily, Disp: 1 Box, Rfl: 3     MAGNESIUM PO, Pt. Unsure of dosage, Disp: , Rfl:      metFORMIN (GLUCOPHAGE) 1000 MG tablet, Take 1 tablet (1,000 mg) by mouth 2 times daily (with meals), Disp: 180 tablet, Rfl: 3     Omega-3 Fatty Acids (OMEGA 3 PO), Take by mouth daily, Disp: , Rfl:      Allergies:   No known drug allergies.       Past Medical History:  Diabetes mellitus   Goiter      Past Surgical History:  History reviewed. No pertinent past surgical history.     Family History:   Diabetes - mother, father, sister     Social History:   Marital Status:    Presents to clinic alone  Tobacco Use: Former smoker, quit 1978.   Alcohol Use: No alcohol use.   PCP: Sharla Wu      Physical Exam:   /74  Pulse 88  Ht 1.753 m (5' 9\")  Wt 86.6 kg (191 lb)  BMI 28.21 kg/m2     Wt Readings from Last 10 Encounters:   10/26/18 86.6 kg " (191 lb)   04/27/18 86.7 kg (191 lb 3.2 oz)   12/22/17 85.6 kg (188 lb 12.8 oz)   12/18/17 85.7 kg (189 lb)   08/11/17 86.7 kg (191 lb 3.2 oz)   05/09/17 86.6 kg (191 lb)   04/14/17 85.4 kg (188 lb 4.8 oz)   11/18/16 85.7 kg (189 lb)   08/19/16 87.1 kg (192 lb)   06/02/16 87.5 kg (193 lb)      GENERAL APPEARANCE: Alert and no distress  NECK: No lymphadenopathy appreciated  Thyroid: No obvious nodules palpated   CV: RRR without M/R/G  Lungs: CTA bilaterally  Abdomen: Soft, Nontender, non distended, positive bowel sounds   Neuro: no focal deficits  Skin: No infection in feet   Diabetic foot exam: normal DP and PT pulses, no trophic changes or ulcerative lesions and normal monofilament exam  Mood: Normal   Lymph: neg in neck and supraclavicular area       Data:  Lab Results   Component Value Date     12/22/2017    POTASSIUM 4.1 12/22/2017    CHLORIDE 104 12/22/2017    CO2 27 12/22/2017    ANIONGAP 8 12/22/2017     (H) 12/22/2017    BUN 10 12/22/2017    CR 0.71 12/22/2017    MOUNIKA 9.3 12/22/2017     Lab Results   Component Value Date    GFRESTIMATED >90 12/22/2017    GFRESTIMATED >90  Non  GFR Calc   11/18/2016    GFRESTIMATED >90  Non  GFR Calc   11/12/2015    GFRESTBLACK >90 12/22/2017    GFRESTBLACK >90   GFR Calc   11/18/2016    GFRESTBLACK >90   GFR Calc   11/12/2015      Lab Results   Component Value Date    MICROL <5 12/22/2017    UMALCR Unable to calculate due to low value 12/22/2017      Lab Results   Component Value Date    A1C 8.3 (A) 06/07/2013    A1C 7.5 (A) 12/23/2011    HEMOGLOBINA1 6.4 (A) 10/26/2018    HEMOGLOBINA1 6.5 (A) 04/27/2018    HEMOGLOBINA1 7.8 (A) 12/22/2017    HEMOGLOBINA1 8.6 (A) 08/11/2017    HEMOGLOBINA1 8.6 (A) 04/14/2017     Lab Results   Component Value Date    CPEPT 4.2 11/18/2016     Lab Results   Component Value Date    CHOL 120 12/22/2017    CHOL 118 11/18/2016    TRIG 197 (H) 12/22/2017    TRIG 129  11/18/2016    HDL 37 (L) 12/22/2017    HDL 35 (L) 11/18/2016    LDL 44 12/22/2017    LDL 57 11/18/2016    NHDL 84 12/22/2017    NHDL 83 11/18/2016     Assessment and Plan:  #1 Type 2 diabetes mellitus without complication, without long-term current use of insulin (H)  Hemoglobin A1c now excellent at 6.4%.  Continue Metformin 2000 mg daily and Glimepiride 1 mg daily.  He has made good lifestyle changes.  Discussed intermittent fasting when traveling for continued weight loss.  He is due for an eye exam in February 2019 and this has been ordered.  - Hemoglobin A1c POCT  - atorvastatin (LIPITOR) 40 MG tablet  Dispense: 90 tablet; Refill: 3  - glimepiride (AMARYL) 1 MG tablet  Dispense: 90 tablet; Refill: 3  - metFORMIN (GLUCOPHAGE) 1000 MG tablet  Dispense: 180 tablet; Refill: 3  - aspirin 81 MG tablet  Dispense: 90 tablet; Refill: 3      #2 Hyperlipidemia   Patient is on Lipitor 40 mg every other day.  Refills given.  August 2018 LDL was reasonable at 78.  Patient is not interested in Lipitor 20 mg daily.    #3 Multinodular Goiter  No obvious nodules appreciated today.  December 2017 TSH was normal.  Most recent neck ultrasound in April 2018 showed no significant interval change.  Will plan to repeat this next year.     Follow up: in 6 months    >50% of 30 minute visit spent in face to face counseling, education and coordination of care related to options for better glycemic control as well as preventing, detecting, and treating hypoglycemia.         Scribe Disclosure:   I, Lulu Ibarra, am serving as a scribe to document services personally performed by Cheri Watson MD at this visit, based upon the provider's statements to me. All documentation has been reviewed by the aforementioned provider prior to being entered into the official medical record.     Portions of this medical record were completed by a scribe. UPON MY REVIEW AND AUTHENTICATION BY ELECTRONIC SIGNATURE, this confirms (a) I performed the applicable  clinical services, and (b) the record is accurate.

## 2018-10-26 NOTE — MR AVS SNAPSHOT
"              After Visit Summary   10/26/2018    Chema Mccullough    MRN: [id number]           Patient Information     Date Of Birth          1960        Visit Information        Provider Department      10/26/2018 9:30 AM Cheri Watson MD [facility] Endocrinology        Today's Diagnoses     Type 2 diabetes mellitus without complication, without long-term current use of insulin (H)    -  1      Care Instructions    Minimize eating on the plane          Follow-ups after your visit        Additional Services     OPHTHALMOLOGY ADULT REFERRAL       Do in feb 2019                  Follow-up notes from your care team     Return in about 6 months (around 4/26/2019).      Who to contact     Please call your clinic at [phone] to:    Ask questions about your health    Make or cancel appointments    Discuss your medicines    Learn about your test results    Speak to your doctor            Additional Information About Your Visit        [employer] Information     [employer] gives you secure access to your electronic health record. If you see a primary care provider, you can also send messages to your care team and make appointments. If you have questions, please call your primary care clinic.  If you do not have a primary care provider, please call [phone] and they will assist you.      Blottr is an electronic gateway that provides easy, online access to your medical records. With [employer], you can request a clinic appointment, read your test results, renew a prescription or communicate with your care team.     To access your existing account, please contact your [facility] or call [phone] for assistance.        Care EveryWhere ID     This is your Care EveryWhere ID. This could be used by other organizations to access your [facility] medical records  [id number]        Your Vitals Were     Pulse Height BMI (Body Mass Index)             88 1.753 m (5' 9\") 28.21 kg/m2          " Blood Pressure from Last 3 Encounters:   10/26/18 115/74   04/27/18 129/79   12/22/17 109/72    Weight from Last 3 Encounters:   10/26/18 86.6 kg (191 lb)   04/27/18 86.7 kg (191 lb 3.2 oz)   12/22/17 85.6 kg (188 lb 12.8 oz)              We Performed the Following     Hemoglobin A1c POCT     OPHTHALMOLOGY ADULT REFERRAL          Today's Medication Changes          These changes are accurate as of 10/26/18 10:01 AM.  If you have any questions, ask your nurse or doctor.               These medicines have changed or have updated prescriptions.        Dose/Directions    LIPITOR 40 MG tablet   This may have changed:    - how much to take  - how to take this  - when to take this  - additional instructions   Used for:  Type 2 diabetes mellitus without complication, without long-term current use of insulin (H)   Generic drug:  atorvastatin        Take one QOD   Quantity:  90 tablet   Refills:  3            Where to get your medicines      These medications were sent to [id number] - [place] - [address]  [address] [facility] [address]     Phone:  [phone]     aspirin 81 MG tablet    glimepiride 1 MG tablet    metFORMIN 1000 MG tablet                Primary Care Provider Office Phone # Fax #    Sharla Wu [phone]       [address] [address]        Equal Access to Services     [facility]: Hadii binu freemano Sorod, waaxda luqadaha, qaybta kaalmada aderose marieda, danny mckenzie . So [facility] [phone].    ATENCIÓN: Si habla español, tiene a castro disposición servicios gratuitos de asistencia lingüística. Kaleb al [phone].    We comply with applicable federal civil rights laws and [place] laws. We do not discriminate on the basis of race, color, national origin, age, disability, sex, sexual orientation, or gender identity.            Thank you!     Thank you for choosing [facility] ENDO[facility]IN[facility]  for your care. Our goal is always to provide you with excellent care. Hearing back from our patients is one way we can continue to improve our services. Please take a few minutes to complete the written survey that you may receive in the mail after your visit with us. Thank you!             Your Updated Medication List - Protect others around you: Learn how to safely use, store and throw away your medicines at www.disposemymeds.org.          This list is accurate as of 10/26/18 10:01 AM.  Always use your most recent med list.                   Brand Name Dispense Instructions for use Diagnosis    aspirin 81 MG tablet     90 tablet    Take 1 tablet (81 mg) by mouth daily    Type 2 diabetes mellitus without complication, without long-term current use of insulin (H)       glimepiride 1 MG tablet    AMARYL    90 tablet    Take 1 tablet (1 mg) by mouth every morning (before breakfast)    Type 2 diabetes mellitus without complication, without long-term current use of insulin (H)       ipratropium 0.03 % spray    ATROVENT    1 Box    Spray 2 sprays into both nostrils 2 times daily    Chronic vasomotor rhinitis       LIPITOR 40 MG tablet   Generic drug:  atorvastatin     90 tablet    Take one QOD    Type 2 diabetes mellitus without complication, without long-term current use of insulin (H)       MAGNESIUM PO      Pt. Unsure of dosage        metFORMIN 1000 MG tablet    GLUCOPHAGE    180 tablet    Take 1 tablet (1,000 mg) by mouth 2 times daily (with meals)    Type 2 diabetes mellitus without complication, without long-term current use of insulin (H)       OMEGA 3 PO      Take by mouth daily

## 2019-02-28 ENCOUNTER — OFFICE VISIT (OUTPATIENT)
Dept: OPHTHALMOLOGY | Facility: CLINIC | Age: 59
End: 2019-02-28
Payer: COMMERCIAL

## 2019-02-28 DIAGNOSIS — H52.203 MYOPIA OF BOTH EYES WITH ASTIGMATISM AND PRESBYOPIA: ICD-10-CM

## 2019-02-28 DIAGNOSIS — E11.9 TYPE 2 DIABETES MELLITUS WITHOUT COMPLICATION, WITHOUT LONG-TERM CURRENT USE OF INSULIN (H): Primary | ICD-10-CM

## 2019-02-28 DIAGNOSIS — H52.13 MYOPIA OF BOTH EYES WITH ASTIGMATISM AND PRESBYOPIA: ICD-10-CM

## 2019-02-28 DIAGNOSIS — H52.4 MYOPIA OF BOTH EYES WITH ASTIGMATISM AND PRESBYOPIA: ICD-10-CM

## 2019-02-28 ASSESSMENT — SLIT LAMP EXAM - LIDS
COMMENTS: MILD DERMATOCHALASIS
COMMENTS: MILD DERMATOCHALASIS

## 2019-02-28 ASSESSMENT — VISUAL ACUITY
OS_CC: J1
OD_CC+: -2
OS_CC+: -2
CORRECTION_TYPE: GLASSES
OD_CC: J1
OS_CC: 20/25
METHOD: SNELLEN - LINEAR
OD_CC: 20/25

## 2019-02-28 ASSESSMENT — REFRACTION_MANIFEST
OS_ADD: +2.50
OS_CYLINDER: +1.50
OD_SPHERE: -4.50
OS_SPHERE: -8.50
OS_AXIS: 070
OD_ADD: +2.50
OD_CYLINDER: +1.25
OD_AXIS: 125

## 2019-02-28 ASSESSMENT — EXTERNAL EXAM - RIGHT EYE: OD_EXAM: NORMAL

## 2019-02-28 ASSESSMENT — TONOMETRY
OD_IOP_MMHG: 17
OS_IOP_MMHG: 15
IOP_METHOD: ICARE

## 2019-02-28 ASSESSMENT — REFRACTION_WEARINGRX
OD_ADD: +2.50
OS_ADD: +2.50
OS_AXIS: 080
OD_AXIS: 118
OD_SPHERE: -5.00
OD_CYLINDER: +1.00
OS_SPHERE: -8.75
OS_CYLINDER: +1.00

## 2019-02-28 ASSESSMENT — CONF VISUAL FIELD
OD_NORMAL: 1
METHOD: COUNTING FINGERS
OS_NORMAL: 1

## 2019-02-28 ASSESSMENT — EXTERNAL EXAM - LEFT EYE: OS_EXAM: NORMAL

## 2019-02-28 ASSESSMENT — CUP TO DISC RATIO
OS_RATIO: 0.3
OD_RATIO: 0.4

## 2019-02-28 NOTE — PROGRESS NOTES
A/P  1.) Type 2 DM without ophthalmic manifestation  -Last A1c 6.4  -No previous history of retinopathy  -Reviewed effects of DM on the eyes and importance of good blood sugar control  -Monitor annually    2.) Myopia/Astig/Presbyopia OU  -Doing well with current glasses, continue    Monitor 1 year diabetic eye exam    I have confirmed the patient's CC, HPI and reviewed Past Medical History, Past Surgical History, Social History, Family History, Problem List, Medication List and agree with Tech note.     Lashae Hansen, THEODORE FAAO

## 2019-02-28 NOTE — LETTER
February 28, 2019       DIABETIC EYE EXAMINATION REPORT:    Cheri Watson MD  [address] [facility] [address]  [place] [address]   Sharla Wu       Name: Chema Mccullough   MRN: [id number]   Date of Service: 2/28/2019       Dear Dr. Watson,    Thank you for referring your patient Chema for diabetic eye exam. He has type 2 diabetes with a recent A1c of 6.4. He has no known history of diabetic retinopathy.    VISUAL ACUITY:    Visual Acuity (Snellen - Linear)       Right Left    Dist cc 20/25 -2 20/25 -2    Near cc J1 J1    Correction:  Glasses           Detailed Retinal Examination:  250.00 NIDDM without Eye Complications - No diabetic retinopathy    Follow up recommendations: in 1 year for reevaluation.    Thank you again for the kind referral.  If I can provide you with any additional information or be of further assistance in the future, please feel free to contact me at any time.    Sincerely,  Lashae Hansen OD FAAO SHARAS  Adjunct   Dept of Ophthalmology & Visual Neuroscience  [facility]  P: [phone]  F: [phone]

## 2019-02-28 NOTE — NURSING NOTE
Chief Complaints and History of Present Illnesses   Patient presents with     Diabetic Eye Exam     Chief Complaint(s) and History of Present Illness(es)     Diabetic Eye Exam     Vision: is stable    Associated symptoms: Negative for blurred vision    Diabetes Type: Type 2    Duration: 12 years    Blood Sugars: is controlled              Comments     Diabetic eye exam today. No problems with vision. No pain. No drops.  Lab Results       Component                Value               Date                       A1C                      8.3                 06/07/2013                 A1C                      7.5                 12/23/2011              Dorothy Aburto COT 2:22 PM February 28, 2019

## 2019-07-19 DIAGNOSIS — E11.9 TYPE 2 DIABETES MELLITUS WITHOUT COMPLICATION, WITHOUT LONG-TERM CURRENT USE OF INSULIN (H): ICD-10-CM

## 2019-07-21 NOTE — TELEPHONE ENCOUNTER
atorvastatin (LIPITOR) 40 MG tablet  Last Written Prescription Date:  10/26/18  Last Fill Quantity: 90,   # refills: 3  Last Office Visit : 10/26/18  Future Office visit: none    Routing refill request to provider for review/approval because: med ordered every other day 10/26/18. [facility] never got that order. Pt has been dosing daily. Do you want every other day or daily? Please send to pharmacy correct order. thanks

## 2019-07-22 RX ORDER — ATORVASTATIN CALCIUM 40 MG/1
40 TABLET, FILM COATED ORAL DAILY
Qty: 90 TABLET | Refills: 3 | Status: SHIPPED | OUTPATIENT
Start: 2019-07-22 | End: 2019-08-16

## 2019-08-02 ENCOUNTER — TELEPHONE (OUTPATIENT)
Dept: ENDOCRINOLOGY | Facility: CLINIC | Age: 59
End: 2019-08-02

## 2019-08-02 NOTE — TELEPHONE ENCOUNTER
[facility]    Phone Message    May a detailed message be left on voicemail: no    Reason for Call: Other: Patient is wanting to get a sooner appointment, and stated that Dr. Watson has helped schedule him in sooner before. Please follow up with patient.     Action Taken: Message routed to:  [facility] ([facility] Lara

## 2019-08-12 ENCOUNTER — OFFICE VISIT (OUTPATIENT)
Dept: ORTHOPEDICS | Facility: CLINIC | Age: 59
End: 2019-08-12
Payer: COMMERCIAL

## 2019-08-12 DIAGNOSIS — L03.032 PARONYCHIA OF GREAT TOE OF LEFT FOOT: Primary | ICD-10-CM

## 2019-08-12 RX ORDER — CEPHALEXIN 500 MG/1
500 CAPSULE ORAL 3 TIMES DAILY
Qty: 21 CAPSULE | Refills: 0 | Status: SHIPPED | OUTPATIENT
Start: 2019-08-12 | End: 2019-08-16

## 2019-08-12 NOTE — PROGRESS NOTES
SPORTS & ORTHOPEDIC WALK-IN VISIT 8/12/2019    Primary Care Physician: Dr. Wu     Reason for visit:   The patient is here for bilateral great toe pain.  Was hiking last week and began to notice soreness and redness under his bilateral toenails.  He noticed a darker discoloration of his toenails.  There is some redness around the nail margins.  Yesterday the left great toe drained blood and pus from underneath the nail.  Symptoms seem to have improved slightly since that time.  No spreading redness.  Patient otherwise feeling well.  Has history of diabetes but no known neuropathy.      What part of your body is injured / painful?  bilateral foot- toes     What caused the injury /pain? Hiking     How long ago did your injury occur or pain begin? 5 days ago     What are your most bothersome symptoms? Other: Toes nails are turning darker     How would you characterize your symptom?  No pain     What makes your symptoms better? Nothing    What makes your symptoms worse?     Have you been previously seen for this problem? No    Medical History:    Any recent changes to your medical history? No    Any new medication prescribed since last visit? No    Have you had surgery on this body part before? No        Review of Systems:    Do you have fever, chills, weight loss? No    Do you have any vision problems? No    Do you have any chest pain or edema? No    Do you have any shortness of breath or wheezing?  No    Do you have stomach problems? No    Do you have any numbness or focal weakness? No    Do you have diabetes? Yes, type 2     Do you have problems with bleeding or clotting? No    Do you have an rashes or other skin lesions? No         Past Medical History, Current Medications, and Allergies are reviewed in the electronic medical record as appropriate.       EXAM:There were no vitals taken for this visit.    General: Alert, pleasant, no distress  Bilateral great toes: There is mild erythema surrounding the  nail plate in the bilateral toes, left greater than right.  The left toenail is slightly loose though well adhered at the proximal nail fold.  There is no palpable fluctuance or induration.  There is no drainage from underneath either nail currently.  The nails are not significantly tender or warm to the touch.  Patient is able to flex and extend at the IP and MTP freely and without significant discomfort.  Sensation is intact light touch.  Cap refill is brisk.    Imaging: No imaging this visit.      Assessment: Patient is a 59 year old male with bilateral great toe injury, suspect mostly mechanical though have some concern for paronychia of the left toe which seem to be improving after spontaneous drainage yesterday.    Recommendations:   Reviewed assessment with the patient in detail  Recommended continued warm soaks to encourage drainage  Discussed that the nail plate may need to be removed and that this could be done in the podiatry clinic.  Referral was given.  Lastly as an added precaution given the patient's diabetes will initiate course of Keflex for antimicrobial coverage for 1 week.    Gabriel Oliveira MD

## 2019-08-15 NOTE — PROGRESS NOTES
[facility]  Endocrinology  Cheri Watson MD  08/16/2019      Chief Complaint:   Diabetes    History of Present Illness:   Chema Mccullough is a 59 year old male with a history of diabetes mellitus type 2, hyperlipidemia, obesity and goiter who presents for follow up on diabetes.     #1 Diabetes mellitus type 2   The patient was diagnosed with prediabetes in 2004 and then in 2009 found to have fasting blood sugar of 184 with A1c of 7.5%.  He was started on Metformin XR in February 2009, dose increased to 500 mg twice daily in March 2013.  A1c from 2013 - 2016 was consistently in the 8% range.  November 2015 evaluation significant for detectable C-peptide of 3.2.  June 2016 his Metformin was increased to 2000 mg daily.  December 2017 he was started on Amaryl 1 mg daily as he was experiencing postprandial hyperglycemia. A1c between April 2018-October 2018 consistently in 6% range. August 2018 PSA was 0.8, creatinine was 0.75, hemoglobin A1c of 6.6, urine for microalbumin was negative, and LDL was 78.     Interval history: A1c at 6.9% in 8/2019. He is still exercising 45 minutes daily. He feels odd if he goes without exercising for a day. His weight was up 2 months ago but it is gradually going down. Reports he has been drinking a lot of wine as he has been traveling significantly over the summer with his most recent trip to [place]. Denies numbness or tingling in feet.     Diabetes monitoring and complications:  CAD: No, on Lipitor, stress test in 2016 was unremarkable   Last eye exam results: 02/2019, no diabetic retinopathy   Last dental exam: not discussed  Microalbuminuria: negative in August 2018  HTN: No  On Statin: Yes  On Aspirin: Yes    #2 Hyperlipidemia  Stress test in 2016 was unremarkable. December 2017 LDL was 44. At that time he was taking Lipitor 40 mg daily. August LDL was 78.      Interval history: Patient on Lipitor 40 mg.      #3 Goiter   June 2016, he was noted to have a multinodular goiter with  several nodules roughly 1 cm in size. October 2016 formal neck ultrasound showed multinodular goiter, with  largest nodules on the right side at 1.1 cm in size (two nodules) and the largest nodule on the left side at 1.2 cm in size.  The patient had declined ultrasound-guided FNA in October 2016. Eval with floor ultrasound in April 2017 showed the following: Right anterior nodule 1.0 x 0.6 x 0.6 cm in size, right posterior/inferior nodule at 1.0 x 0.5 x 1.0 cm in size, left inferior nodule at 0.6 x 0.5 x 0.7 cm in size. April 2018 US showed no interval change in thyroid nodules.        #4 Paronychia of left great toe    He went to [place] for a conference a week ago. He did 5 hikes in 3 days. On the second day he did a 11 mile hike. The downward elevation caused toenail injuries. He was seen in orthopedics where he was told there was no infection. He was given week course of antibiotics but did not take as it started to resolve on its own.  He cleans it every morning with hydrogen peroxide.     Review of Systems:   Pertinent items are noted in HPI.  All other systems are negative.    Active Medications:      aspirin 81 MG tablet, Take 1 tablet (81 mg) by mouth daily, Disp: 90 tablet, Rfl: 3     atorvastatin (LIPITOR) 40 MG tablet, TAKE 1 TABLET (40 MG) BY MOUTH DAILY TAKE ONE DAILY, Disp: 90 tablet, Rfl: 3     glimepiride (AMARYL) 1 MG tablet, Take 1 tablet (1 mg) by mouth every morning (before breakfast), Disp: 90 tablet, Rfl: 3     MAGNESIUM PO, Pt. Unsure of dosage, Disp: , Rfl:      metFORMIN (GLUCOPHAGE) 1000 MG tablet, Take 1 tablet (1,000 mg) by mouth 2 times daily (with meals), Disp: 180 tablet, Rfl: 3     Omega-3 Fatty Acids (OMEGA 3 PO), Take by mouth daily, Disp: , Rfl:     Current Facility-Administered Medications:      lidocaine 1 % injection 2 mL, 2 mL, INTRA-ARTICULAR, Once, Camila Baeza MD      Allergies:   Patient has no known allergies.      Past Medical History:  Diabetes mellitus type 2  "  Coronary arteriosclerosis   Hyperlipidemia   Obesity   Goiter   Colon adenoma     Past Surgical History:  No pertinent surgical history.    Family History:   Mother: diabetes   Father: diabetes   Other: premature coronary heart disease       Social History:   The patient was alone   Smoking Status: former; 1 pack per day; 9 years since quitting   Smokeless Tobacco: former    Alcohol Use: no       Physical Exam:   /72   Pulse 84   Ht 1.753 m (5' 9\")   Wt 87.2 kg (192 lb 4.8 oz)   BMI 28.40 kg/m       Wt Readings from Last 10 Encounters:   08/16/19 87.2 kg (192 lb 4.8 oz)   10/26/18 86.6 kg (191 lb)   04/27/18 86.7 kg (191 lb 3.2 oz)   12/22/17 85.6 kg (188 lb 12.8 oz)   12/18/17 85.7 kg (189 lb)   08/11/17 86.7 kg (191 lb 3.2 oz)   05/09/17 86.6 kg (191 lb)   04/14/17 85.4 kg (188 lb 4.8 oz)   11/18/16 85.7 kg (189 lb)   08/19/16 87.1 kg (192 lb)        GENERAL APPEARANCE: Alert and no distress  NECK: No lymphadenopathy appreciated  Thyroid: No obvious nodules palpated  - floor US done per below  CV: RRR without M/R/G  Lungs: CTA bilaterally  Abdomen: Soft, Nontender, non distended, positive bowel sounds   Neuro: normal reflexes, no focal deficits  Skin: No infection in feet  Mood: Normal   Lymph: neg in neck and supraclavicular area     Foot Exam: Left great toe with bruising and onychomycosis. Normal DP and PT pulses, no trophic changes or ulcerative lesions, normal sensory exam.      Floor Thyroid Ultrasound 08/2019  Right anterior nodule 0.9 x 0.5 x 0.8 cm in size   Right posterior/inferior nodule at 0.5 x 0.5 x 0.6 cm in size   Left inferior nodule at 0.8 x 0.6 x 0.8 cm in size.      Data:  Lab Results   Component Value Date     12/22/2017    POTASSIUM 4.1 12/22/2017    CHLORIDE 104 12/22/2017    CO2 27 12/22/2017    ANIONGAP 8 12/22/2017     (H) 12/22/2017    BUN 10 12/22/2017    CR 0.71 12/22/2017    MOUNIKA 9.3 12/22/2017     Lab Results   Component Value Date    GFRESTIMATED >90 " 12/22/2017    GFRESTIMATED >90  Non  GFR Calc   11/18/2016    GFRESTIMATED >90  Non  GFR Calc   11/12/2015    GFRESTBLACK >90 12/22/2017    GFRESTBLACK >90   GFR Calc   11/18/2016    GFRESTBLACK >90   GFR Calc   11/12/2015      Lab Results   Component Value Date    MICROL <5 12/22/2017    UMALCR Unable to calculate due to low value 12/22/2017        Lab Results   Component Value Date    A1C 8.3 (A) 06/07/2013    A1C 7.5 (A) 12/23/2011    HEMOGLOBINA1 6.9 (A) 08/16/2019    HEMOGLOBINA1 6.4 (A) 10/26/2018    HEMOGLOBINA1 6.5 (A) 04/27/2018    HEMOGLOBINA1 7.8 (A) 12/22/2017    HEMOGLOBINA1 8.6 (A) 08/11/2017     Lab Results   Component Value Date    CPEPT 4.2 11/18/2016       Lab Results   Component Value Date    CHOL 120 12/22/2017    CHOL 118 11/18/2016    TRIG 197 (H) 12/22/2017    TRIG 129 11/18/2016    HDL 37 (L) 12/22/2017    HDL 35 (L) 11/18/2016    LDL 44 12/22/2017    LDL 57 11/18/2016    NHDL 84 12/22/2017    NHDL 83 11/18/2016       Assessment and Plan:  #1 Diabetes mellitus type 2   Hemoglobin A1c 6.9% in August 2019. Continue Metformin 2000 mg daily and Glimepiride 1 mg daily.  He has made good lifestyle changes including daily exercise. Encouraged low carb diet. Discussed SGLT-2 inhibitors and GLP-1 agonists in depth. Pt is hesitant as they are relatively new. He will reconsider in the future. Complete routine labs prior to next visit.   - Hemoglobin A1c POCT  - Basic metabolic panel  - Albumin Random Urine Quantitative with Creat Ratio  - glimepiride (AMARYL) 1 MG tablet  Dispense: 90 tablet; Refill: 3  - metFORMIN (GLUCOPHAGE) 1000 MG tablet  Dispense: 180 tablet; Refill: 3    #2 Hyperlipidemia   Patient is on Lipitor 40 mg every day. August 2018 LDL was reasonable at 78.   - Lipid Profile  - atorvastatin (LIPITOR) 40 MG tablet  Dispense: 90 tablet; Refill: 3    #3 Goiter  No obvious nodules palpated today.  Floor US showed no significant  interval change from April 2018. Will repeat next year.   - TSH  - T4 free  - 25 Hydroxyvitamin D2 and D3    #4 Paronychia of left great toe    Exam revealed bruising and onychomycosis on greater left toe. Covered with Band-Aid in clinic and encouraged him to continue cleaning with hydrogen peroxide.      Follow-up: Return in about 6 months (around 2/16/2020).     >50% of 30 minute visit spent in face to face counseling, education and coordination of care related to options for better glycemic control as well as preventing, detecting, and treating hypoglycemia.         Scribe Disclosure:  I, Soledad Beavers, am serving as a scribe to document services personally performed by Cheri Watson MD at this visit, based upon the provider's statements to me. All documentation has been reviewed by the aforementioned provider prior to being entered into the official medical record.     Portions of this medical record were completed by a scribe. UPON MY REVIEW AND AUTHENTICATION BY ELECTRONIC SIGNATURE, this confirms (a) I performed the applicable clinical services, and (b) the record is accurate.

## 2019-08-16 ENCOUNTER — OFFICE VISIT (OUTPATIENT)
Dept: ENDOCRINOLOGY | Facility: CLINIC | Age: 59
End: 2019-08-16
Payer: COMMERCIAL

## 2019-08-16 VITALS
SYSTOLIC BLOOD PRESSURE: 109 MMHG | HEIGHT: 69 IN | HEART RATE: 84 BPM | DIASTOLIC BLOOD PRESSURE: 72 MMHG | WEIGHT: 192.3 LBS | BODY MASS INDEX: 28.48 KG/M2

## 2019-08-16 DIAGNOSIS — E11.9 TYPE 2 DIABETES MELLITUS WITHOUT COMPLICATION, WITHOUT LONG-TERM CURRENT USE OF INSULIN (H): Primary | ICD-10-CM

## 2019-08-16 LAB
ANION GAP SERPL CALCULATED.3IONS-SCNC: 3 MMOL/L (ref 3–14)
BUN SERPL-MCNC: 17 MG/DL (ref 7–30)
CALCIUM SERPL-MCNC: 9.5 MG/DL (ref 8.5–10.1)
CHLORIDE SERPL-SCNC: 105 MMOL/L (ref 94–109)
CHOLEST SERPL-MCNC: 146 MG/DL
CO2 SERPL-SCNC: 30 MMOL/L (ref 20–32)
CREAT SERPL-MCNC: 0.86 MG/DL (ref 0.66–1.25)
CREAT UR-MCNC: 58 MG/DL
GFR SERPL CREATININE-BSD FRML MDRD: >90 ML/MIN/{1.73_M2}
GLUCOSE SERPL-MCNC: 144 MG/DL (ref 70–99)
HBA1C MFR BLD: 6.9 % (ref 4.3–6)
HDLC SERPL-MCNC: 40 MG/DL
LDLC SERPL CALC-MCNC: 76 MG/DL
MICROALBUMIN UR-MCNC: <5 MG/L
MICROALBUMIN/CREAT UR: NORMAL MG/G CR (ref 0–17)
NONHDLC SERPL-MCNC: 107 MG/DL
POTASSIUM SERPL-SCNC: 4.2 MMOL/L (ref 3.4–5.3)
SODIUM SERPL-SCNC: 138 MMOL/L (ref 133–144)
T4 FREE SERPL-MCNC: 0.94 NG/DL (ref 0.76–1.46)
TRIGL SERPL-MCNC: 154 MG/DL
TSH SERPL DL<=0.005 MIU/L-ACNC: 1.08 MU/L (ref 0.4–4)

## 2019-08-16 RX ORDER — ATORVASTATIN CALCIUM 40 MG/1
40 TABLET, FILM COATED ORAL DAILY
Qty: 90 TABLET | Refills: 3 | Status: SHIPPED | OUTPATIENT
Start: 2019-08-16 | End: 2020-02-21

## 2019-08-16 RX ORDER — GLIMEPIRIDE 1 MG/1
1 TABLET ORAL
Qty: 90 TABLET | Refills: 3 | Status: SHIPPED | OUTPATIENT
Start: 2019-08-16 | End: 2020-08-07

## 2019-08-16 ASSESSMENT — PAIN SCALES - GENERAL: PAINLEVEL: NO PAIN (0)

## 2019-08-16 ASSESSMENT — MIFFLIN-ST. JEOR: SCORE: 1667.65

## 2019-08-16 NOTE — NURSING NOTE
Chief Complaint   Patient presents with     RECHECK     Type 2 Diabetes     Capillary puncture performed for Hemoglobin A1C test. Patient tolerated well.    Betsy Gonzalez MA

## 2019-08-16 NOTE — LETTER
8/16/2019       RE: Chema Mccullough  [address] [place] [address]     Dear Colleague,    Thank you for referring your patient, Chema Mccullough, to the [facility] ENDOCRINOLOGY at [facility]. Please see a copy of my visit note below.    [facility]  Endocrinology  Cheri Watson MD  08/16/2019      Chief Complaint:   Diabetes    History of Present Illness:   Chema Mccullough is a 59 year old male with a history of diabetes mellitus type 2, hyperlipidemia, obesity and goiter who presents for follow up on diabetes.     #1 Diabetes mellitus type 2   The patient was diagnosed with prediabetes in 2004 and then in 2009 found to have fasting blood sugar of 184 with A1c of 7.5%.  He was started on Metformin XR in February 2009, dose increased to 500 mg twice daily in March 2013.  A1c from 2013 - 2016 was consistently in the 8% range.  November 2015 evaluation significant for detectable C-peptide of 3.2.  June 2016 his Metformin was increased to 2000 mg daily.  December 2017 he was started on Amaryl 1 mg daily as he was experiencing postprandial hyperglycemia. A1c between April 2018-October 2018 consistently in 6% range. August 2018 PSA was 0.8, creatinine was 0.75, hemoglobin A1c of 6.6, urine for microalbumin was negative, and LDL was 78.     Interval history: A1c at 6.9% in 8/2019. He is still exercising 45 minutes daily. He feels odd if he goes without exercising for a day. His weight was up 2 months ago but it is gradually going down. Reports he has been drinking a lot of wine as he has been traveling significantly over the summer with his most recent trip to [place]. Denies numbness or tingling in feet.     Diabetes monitoring and complications:  CAD: No, on Lipitor, stress test in 2016 was unremarkable   Last eye exam results: 0 2/2019, no diabetic retinopathy   Last dental exam: not discussed  Microalbuminuria: negative in August 2018  HTN: No  On Statin:  Yes  On Aspirin: Yes    #2 Hyperlipidemia  Stress test in 2016 was unremarkable. December 2017 LDL was 44. At that time he was taking Lipitor 40 mg daily. August LDL was 78.      Interval history: Patient on Lipitor 40 mg.      #3 Goiter   June 2016, he was noted to have a multinodular goiter with several nodules roughly 1 cm in size. October 2016 formal neck ultrasound showed multinodular goiter, with  largest nodules on the right side at 1.1 cm in size (two nodules) and the largest nodule on the left side at 1.2 cm in size.  The patient had declined ultrasound-guided FNA in October 2016. Eval with floor ultrasound in April 2017 showed the following: Right anterior nodule 1.0 x 0.6 x 0.6 cm in size, right posterior/inferior nodule at 1.0 x 0.5 x 1.0 cm in size, left inferior nodule at 0.6 x 0.5 x 0.7 cm in size. April 2018 US showed no interval change in thyroid nodules.        #4 Paronychia of left great toe    He went to [place] for a conference a week ago. He did 5 hikes in 3 days. On the second day he did a 11 mile hike. The downward elevation caused toenail injuries. He was seen in orthopedics where he was told there was no infection. He was given week course of antibiotics but did not take as it started to resolve on its own.  He cleans it every morning with hydrogen peroxide.     Review of Systems:   Pertinent items are noted in HPI.  All other systems are negative.    Active Medications:      aspirin 81 MG tablet, Take 1 tablet (81 mg) by mouth daily, Disp: 90 tablet, Rfl: 3     atorvastatin (LIPITOR) 40 MG tablet, TAKE 1 TABLET (40 MG) BY MOUTH DAILY TAKE ONE DAILY, Disp: 90 tablet, Rfl: 3     glimepiride (AMARYL) 1 MG tablet, Take 1 tablet (1 mg) by mouth every morning (before breakfast), Disp: 90 tablet, Rfl: 3     MAGNESIUM PO, Pt. Unsure of dosage, Disp: , Rfl:      metFORMIN (GLUCOPHAGE) 1000 MG tablet, Take 1 tablet (1,000 mg) by mouth 2 times daily (with meals), Disp: 180 tablet, Rfl: 3     Omega-3  "Fatty Acids (OMEGA 3 PO), Take by mouth daily, Disp: , Rfl:     Current Facility-Administered Medications:      lidocaine 1 % injection 2 mL, 2 mL, INTRA-ARTICULAR, Once, Camila Baeza MD      Allergies:   Patient has no known allergies.      Past Medical History:  Diabetes mellitus type 2   Coronary arteriosclerosis   Hyperlipidemia   Obesity   Goiter   Colon adenoma     Past Surgical History:  No pertinent surgical history.    Family History:   Mother: diabetes   Father: diabetes   Other: premature coronary heart disease       Social History:   The patient was alone   Smoking Status: former; 1 pack per day; 9 years since quitting   Smokeless Tobacco: former    Alcohol Use: no       Physical Exam:   /72   Pulse 84   Ht 1.753 m (5' 9\")   Wt 87.2 kg (192 lb 4.8 oz)   BMI 28.40 kg/m        Wt Readings from Last 10 Encounters:   08/16/19 87.2 kg (192 lb 4.8 oz)   10/26/18 86.6 kg (191 lb)   04/27/18 86.7 kg (191 lb 3.2 oz)   12/22/17 85.6 kg (188 lb 12.8 oz)   12/18/17 85.7 kg (189 lb)   08/11/17 86.7 kg (191 lb 3.2 oz)   05/09/17 86.6 kg (191 lb)   04/14/17 85.4 kg (188 lb 4.8 oz)   11/18/16 85.7 kg (189 lb)   08/19/16 87.1 kg (192 lb)        GENERAL APPEARANCE: Alert and no distress  NECK: No lymphadenopathy appreciated  Thyroid: No obvious nodules palpated  - floor US done per below  CV: RRR without M/R/G  Lungs: CTA bilaterally  Abdomen: Soft, Nontender, non distended, positive bowel sounds   Neuro: normal reflexes, no focal deficits  Skin: No infection in feet  Mood: Normal   Lymph: neg in neck and supraclavicular area     Foot Exam: Left great toe with bruising and onychomycosis. Normal DP and PT pulses, no trophic changes or ulcerative lesions, normal sensory exam.      Floor Thyroid Ultrasound 08/2019  Right anterior nodule 0.9 x 0.5 x 0.8 cm in size   Right posterior/inferior nodule at 0.5 x 0.5 x 0.6 cm in size   Left inferior nodule at 0.8 x 0.6 x 0.8 cm in size.      Data:  Lab Results "   Component Value Date     12/22/2017    POTASSIUM 4.1 12/22/2017    CHLORIDE 104 12/22/2017    CO2 27 12/22/2017    ANIONGAP 8 12/22/2017     (H) 12/22/2017    BUN 10 12/22/2017    CR 0.71 12/22/2017    MOUNIKA 9.3 12/22/2017     Lab Results   Component Value Date    GFRESTIMATED >90 12/22/2017    GFRESTIMATED >90  Non  GFR Calc   11/18/2016    GFRESTIMATED >90  Non  GFR Calc   11/12/2015    GFRESTBLACK >90 12/22/2017    GFRESTBLACK >90   GFR Calc   11/18/2016    GFRESTBLACK >90   GFR Calc   11/12/2015      Lab Results   Component Value Date    MICROL <5 12/22/2017    UMALCR Unable to calculate due to low value 12/22/2017        Lab Results   Component Value Date    A1C 8.3 (A) 06/07/2013    A1C 7.5 (A) 12/23/2011    HEMOGLOBINA1 6.9 (A) 08/16/2019    HEMOGLOBINA1 6.4 (A) 10/26/2018    HEMOGLOBINA1 6.5 (A) 04/27/2018    HEMOGLOBINA1 7.8 (A) 12/22/2017    HEMOGLOBINA1 8.6 (A) 08/11/2017     Lab Results   Component Value Date    CPEPT 4.2 11/18/2016       Lab Results   Component Value Date    CHOL 120 12/22/2017    CHOL 118 11/18/2016    TRIG 197 (H) 12/22/2017    TRIG 129 11/18/2016    HDL 37 (L) 12/22/2017    HDL 35 (L) 11/18/2016    LDL 44 12/22/2017    LDL 57 11/18/2016    NHDL 84 12/22/2017    NHDL 83 11/18/2016       Assessment and Plan:  #1 Diabetes mellitus type 2   Hemoglobin A1c 6.9% in August 2019. Continue Metformin 2000 mg daily and Glimepiride 1 mg daily.  He has made good lifestyle changes including daily exercise. Encouraged low carb diet. Discussed SGLT-2 inhibitors and GLP-1 agonists in depth. Pt is hesitant as they are relatively new. He will reconsider in the future. Complete routine labs prior to next visit.   - Hemoglobin A1c POCT  - Basic metabolic panel  - Albumin Random Urine Quantitative with Creat Ratio  - glimepiride (AMARYL) 1 MG tablet  Dispense: 90 tablet; Refill: 3  - metFORMIN (GLUCOPHAGE) 1000 MG tablet   Dispense: 180 tablet; Refill: 3    #2 Hyperlipidemia   Patient is on Lipitor 40 mg every day. August 2018 LDL was reasonable at 78.   - Lipid Profile  - atorvastatin (LIPITOR) 40 MG tablet  Dispense: 90 tablet; Refill: 3    #3 Goiter  No obvious nodules palpated today.  Floor US showed no significant interval change from April 2018. Will repeat next year.   - TSH  - T4 free  - 25 Hydroxyvitamin D2 and D3    #4 Paronychia of left great toe    Exam revealed bruising and onychomycosis on greater left toe. Covered with Band-Aid in clinic and encouraged him to continue cleaning with hydrogen peroxide.      Follow-up: Return in about 6 months (around 2/16/2020).     >50% of 30 minute visit spent in face to face counseling, education and coordination of care related to options for better glycemic control as well as preventing, detecting, and treating hypoglycemia.         Scribe Disclosure:  I, Soledad Beavers, am serving as a scribe to document services personally performed by Cheri Watson MD at this visit, based upon the provider's statements to me. All documentation has been reviewed by the aforementioned provider prior to being entered into the official medical record.     Portions of this medical record were completed by a scribe. UPON MY REVIEW AND AUTHENTICATION BY ELECTRONIC SIGNATURE, this confirms (a) I performed the applicable clinical services, and (b) the record is accurate.        Again, thank you for allowing me to participate in the care of your patient.      Sincerely,    Cheri Watson MD

## 2019-08-20 LAB
DEPRECATED CALCIDIOL+CALCIFEROL SERPL-MC: <34 UG/L (ref 20–75)
VITAMIN D2 SERPL-MCNC: <5 UG/L
VITAMIN D3 SERPL-MCNC: 29 UG/L

## 2019-11-04 ENCOUNTER — HEALTH MAINTENANCE LETTER (OUTPATIENT)
Age: 59
End: 2019-11-04

## 2019-11-17 DIAGNOSIS — E11.9 TYPE 2 DIABETES MELLITUS WITHOUT COMPLICATION, WITHOUT LONG-TERM CURRENT USE OF INSULIN (H): ICD-10-CM

## 2019-11-19 NOTE — TELEPHONE ENCOUNTER
ASPIRIN EC 81 MG TABLET  Last Written Prescription Date:  10/26/18  Last Fill Quantity: 90,   # refills: 3  Last Office Visit : 8/16/19  Future Office visit:  2/21/20    Routing refill request to provider for review/approval because:  Drug not on the [facility] or [facility] refill protocol

## 2019-11-25 ENCOUNTER — TRANSFERRED RECORDS (OUTPATIENT)
Dept: MULTI SPECIALTY CLINIC | Facility: CLINIC | Age: 59
End: 2019-11-25

## 2020-02-21 ENCOUNTER — OFFICE VISIT (OUTPATIENT)
Dept: ENDOCRINOLOGY | Facility: CLINIC | Age: 60
End: 2020-02-21
Payer: COMMERCIAL

## 2020-02-21 VITALS
WEIGHT: 189.7 LBS | HEIGHT: 69 IN | HEART RATE: 82 BPM | BODY MASS INDEX: 28.1 KG/M2 | SYSTOLIC BLOOD PRESSURE: 116 MMHG | DIASTOLIC BLOOD PRESSURE: 71 MMHG

## 2020-02-21 DIAGNOSIS — E04.9 GOITER: ICD-10-CM

## 2020-02-21 DIAGNOSIS — E11.9 TYPE 2 DIABETES MELLITUS WITHOUT COMPLICATION, WITHOUT LONG-TERM CURRENT USE OF INSULIN (H): Primary | ICD-10-CM

## 2020-02-21 LAB — HBA1C MFR BLD: 7.1 % (ref 4.3–6)

## 2020-02-21 RX ORDER — ATORVASTATIN CALCIUM 40 MG/1
40 TABLET, FILM COATED ORAL DAILY
Qty: 90 TABLET | Refills: 3 | Status: SHIPPED | OUTPATIENT
Start: 2020-02-21 | End: 2020-08-07

## 2020-02-21 ASSESSMENT — PAIN SCALES - GENERAL: PAINLEVEL: NO PAIN (0)

## 2020-02-21 ASSESSMENT — MIFFLIN-ST. JEOR: SCORE: 1655.85

## 2020-02-21 NOTE — LETTER
2/21/2020       RE: Chema Mccullough  [address] [place] [address]     Dear Colleague,    Thank you for referring your patient, Chema Mccullough, to the [facility] ENDOCRINOLOGY at [facility]. Please see a copy of my visit note below.    [facility]  Endocrinology  Cheri Watson MD  02/21/2020      Chief Complaint:   Diabetes    History of Present Illness:   Chema Mccullough is a 59 year old male with a history of type 2 diabetes and goiter who presents for follow up of diabetes.    #1 Diabetes mellitus type 2  The patient was diagnosed with prediabetes in 2004 and then in 2009 found to have fasting blood sugar of 184 with A1c of 7.5%.  He was started on Metformin XR in February 2009, dose increased to 500 mg twice daily in March 2013.  A1c from 2013 - 2016 was consistently in the 8% range.  November 2015 evaluation significant for detectable C-peptide of 3.2.  June 2016 his Metformin was increased to 2000 mg daily.  December 2017 he was started on Amaryl 1 mg daily as he was experiencing postprandial hyperglycemia. A1c between April 2018-October 2018 consistently in 6% range. August 2018 PSA was 0.8, creatinine was 0.75, hemoglobin A1c of 6.6, urine for microalbumin was negative, and LDL was 78.  August 2019 hemoglobin A1c was 6.9.  During his previous visit on 8/16/19 SGLT-2 inhibitors and GLP-1 agonists were discussed, but the patient was hesitant to start them as they were new.    Interval History  Feb 2020 HbA1c is  7.1, which is higher from 6.9 on 8/16/19.  He denies any weight change.  He continues to exercise 45 minutes daily.  He denies any vision changes or numbness or tingling.  He also denies increased frequency in urination.  He attributes his increased hemoglobin A1c level to diet and overeating carbohydrates.  He does not have a glucose monitor.  He currently takes metformin 2000 mg daily as well as Amaryl 1 mg daily.    Diabetes monitoring and  complications:  CAD: No  Last eye exam results: 2/28/19 - No diabetic retinopathy  Microalbuminuria: 8/16/19 - Unable to detect  Neuropathy: No  HTN: No  On Statin: Yes  On Aspirin: Yes  Depression: No  Erectile dysfunction: No    #2 Hyperlipidemia  Stress test in 2016 was unremarkable. December 2017 LDL was 44. At that time he was taking Lipitor 40 mg daily. August 2019 LDL was 78.     Interval History  He continues on Lipitor daily.    #3 Goite r  June 2016, he was noted to have a multinodular goiter with several nodules roughly 1 cm in size. October 2016 formal neck ultrasound showed multinodular goiter, with  largest nodules on the right side at 1.1 cm in size (two nodules) and the largest nodule on the left side at 1.2 cm in size.  The patient had declined ultrasound-guided FNA in October 2016. Eval with floor ultrasound in April 2017 showed the following: Right anterior nodule 1.0 x 0.6 x 0.6 cm in size, right posterior/inferior nodule at 1.0 x 0.5 x 1.0 cm in size, left inferior nodule at 0.6 x 0.5 x 0.7 cm in size. April 2018 US showed no interval change in thyroid nodules.    Interval History  He denies palpitations, flushing, constipation/diarrhea, or weight change.  He feels his thyroid function and overall energy level is unchanged.    #4 Paronychia of left great toe    The patient went to [place] in August 2019, and did 5 hikes in 3 days, with an 11 mile hike on the second day which caused a toenail injury. He was seen by orthopedics and was given a week of antibiotics, but did not take them as his paronychia was resolving with hydrogen peroxide cleaning.    Interval History  This issue has since resolved.    #5 Pruritic rash on legs bilaterally  Today, he presents with a pruritic rash, mostly on his lateral left leg.  He feels it is circulation related, as it gets better when he lies down after sleep and worsens when he stands for a long time.  His PCP prescribed a steroid cream to be taken every 2  "weeks, which has helped.  He notes that the thickness of the rash has improved although the color has become more red.  He also notes small similar rashes on his right lateral leg as well as his second knuckle on his left hand.    Review of Systems:   Pertinent items are noted in HPI.  All other systems are negative.    Active Medications:     Current Outpatient Medications:      aspirin (ASA) 81 MG EC tablet, Take 1 tablet (81 mg) by mouth daily, Disp: 90 tablet, Rfl: 3     atorvastatin (LIPITOR) 40 MG tablet, Take 1 tablet (40 mg) by mouth daily Take one daily, Disp: 90 tablet, Rfl: 3     glimepiride (AMARYL) 1 MG tablet, Take 1 tablet (1 mg) by mouth every morning (before breakfast), Disp: 90 tablet, Rfl: 3     MAGNESIUM PO, Pt. Unsure of dosage, Disp: , Rfl:      metFORMIN (GLUCOPHAGE) 1000 MG tablet, Take 1 tablet (1,000 mg) by mouth 2 times daily (with meals), Disp: 180 tablet, Rfl: 3     Omega-3 Fatty Acids (OMEGA 3 PO), Take by mouth daily, Disp: , Rfl:     Current Facility-Administered Medications:      lidocaine 1 % injection 2 mL, 2 mL, INTRA-ARTICULAR, Once, Camila Baeza MD      Allergies:   Patient has no known allergies.      Past Medical History:  Type 2 diabetes mellitus  Goiter  Coronary arteriosclerosis  Hyperlipidemia  Obesity  Colon adenoma     Past Surgical History:  No past surgical history on file.    Family History:   Diabetes: Mother, father      Social History:   Social History     Tobacco Use     Smoking status: Former Smoker     Packs/day: 1.00     Types: Cigarettes, Cigars     Start date: 10/10/1978     Last attempt to quit: 2011     Years since quittin.1     Smokeless tobacco: Former User   Substance Use Topics     Alcohol use: No     Drug use: No        Physical Exam:   /71   Pulse 82   Ht 1.753 m (5' 9\")   Wt 86 kg (189 lb 11.2 oz)   BMI 28.01 kg/m       Wt Readings from Last 10 Encounters:   19 87.2 kg (192 lb 4.8 oz)   10/26/18 86.6 kg (191 lb) "   04/27/18 86.7 kg (191 lb 3.2 oz)   12/22/17 85.6 kg (188 lb 12.8 oz)   12/18/17 85.7 kg (189 lb)   08/11/17 86.7 kg (191 lb 3.2 oz)   05/09/17 86.6 kg (191 lb)   04/14/17 85.4 kg (188 lb 4.8 oz)   11/18/16 85.7 kg (189 lb)   08/19/16 87.1 kg (192 lb)        GENERAL APPEARANCE: Alert and no distress  NECK: No lymphadenopathy appreciated  Eyes: No obvious retinopathy noted  Thyroid: No obvious nodules palpated   CV: RRR without M/R/G  Lungs: CTA bilaterally  Neuro: no focal deficits  Skin: No infection in feet, left lower leg elevated reddened rash approximately 4 inches in diameter that blanches upon palpation, right lower leg rash approximately 1 inch in diameter, small rash on second knuckle on the left hand  Mood: Normal   Lymph: neg in neck and supraclavicular area     Data:  Hemoglobin A1c today was 7.1.    Lab Results   Component Value Date     08/16/2019    POTASSIUM 4.2 08/16/2019    CHLORIDE 105 08/16/2019    CO2 30 08/16/2019    ANIONGAP 3 08/16/2019     (H) 08/16/2019    BUN 17 08/16/2019    CR 0.86 08/16/2019    MOUNIKA 9.5 08/16/2019     Lab Results   Component Value Date    GFRESTIMATED >90 08/16/2019    GFRESTIMATED >90 12/22/2017    GFRESTIMATED >90  Non  GFR Calc   11/18/2016    GFRESTBLACK >90 08/16/2019    GFRESTBLACK >90 12/22/2017    GFRESTBLACK >90   GFR Calc   11/18/2016      Lab Results   Component Value Date    MICROL <5 08/16/2019    UMALCR Unable to calculate due to low value 08/16/2019        Lab Results   Component Value Date    A1C 8.3 (A) 06/07/2013    A1C 7.5 (A) 12/23/2011    HEMOGLOBINA1 6.9 (A) 08/16/2019    HEMOGLOBINA1 6.4 (A) 10/26/2018    HEMOGLOBINA1 6.5 (A) 04/27/2018    HEMOGLOBINA1 7.8 (A) 12/22/2017    HEMOGLOBINA1 8.6 (A) 08/11/2017     Lab Results   Component Value Date    CPEPT 4.2 11/18/2016       Lab Results   Component Value Date    CHOL 146 08/16/2019    CHOL 120 12/22/2017    TRIG 154 (H) 08/16/2019    TRIG 197 (H)  12/22/2017    HDL 40 08/16/2019    HDL 37 (L) 12/22/2017    LDL 76 08/16/2019    LDL 44 12/22/2017    NHDL 107 08/16/2019    NHDL 84 12/22/2017       Assessment and Plan:     #1 Diabetes mellitus type 2  His hemoglobin A1c is slightly elevated today at 7.1, increased from 6.9 in August 2019.  His weight and diet are stable he continues to exercise 45 minutes daily.  He feels the Amaryl and metformin are helpful to his management of diabetes.  He feels he can better control his diabetes through diet and exercise.      We discussed extensively the possibility of starting SGLT2 inhibitor.  We agreed to try a 14-day trial of 10 mg daily Jardiance and stopping the Amaryl, seeing how he tolerates this new medication. Regardless, we will continue with the metformin at 1000 mg twice daily.  Explained the cardiovascular protective effects of Jardiance as well as the possible side effects of UTI, yeast infection, and polyuria.  If he ends up not tolerating the Jardiance, we will continue the Amaryl along with metformin.    #2 Hyperlipidemia  He is to continue on Lipitor.    #3 Goiter  On exam today, we are not concerned for increased size of goiter.  However, we will plan for another thyroid ultrasound in 2020.    #4 Pruritic rash on legs bilaterally, likely post inflammatory hyperpigmentation  In addition to the creams and ointments he is using to treat his rashes and decreased itching sensation, we recommended Sarna as a possible anti-itch cream.  INoted redness may be related to post inflammatory hyperpigmentation.     Follow-up: 6-month follow-up with thyroid ultrasound.  Will call in 1 week to see how he is tolerating the Jardiance.    >50% of 30 minute visit spent in face to face counseling, education and coordination of care related to options for better glycemic control as well as preventing, detecting, and treating hypoglycemia.      Scribe Disclosure:  Nelson RICHARDSON, am serving as a scribe to prepare the note  for today's visit. All documentation has been reviewed by the aforementioned provider prior to being entered into the official medical record.    Portions of this medical record were completed by a scribe. UPON MY REVIEW AND AUTHENTICATION BY ELECTRONIC SIGNATURE, this confirms (a) I performed the applicable clinical services, and (b) the record is accurate.      I, Dhruv Polanco MS3, scribed for Dr. Cheri Watson. I, Dr. Cheri Watson reviewed, edited the aforementioned note and personally performed the entire clinical encounter documented in this note.    I was present with the medical student who participated in the service and in the documentation of the note.  I have verified the history and personally performed a physical exam and medical decision making.  I agree with assessment and plan of care as documented in the note.  Signed February 21, 2020    Cheri Watson MD, MS    [phone]

## 2020-02-21 NOTE — PROGRESS NOTES
[facility]  Endocrinology  Cheri Watson MD  02/21/2020      Chief Complaint:   Diabetes    History of Present Illness:   Chema Mccullough is a 59 year old male with a history of type 2 diabetes and goiter who presents for follow up of diabetes.    #1 Diabetes mellitus type 2  The patient was diagnosed with prediabetes in 2004 and then in 2009 found to have fasting blood sugar of 184 with A1c of 7.5%.  He was started on Metformin XR in February 2009, dose increased to 500 mg twice daily in March 2013.  A1c from 2013 - 2016 was consistently in the 8% range.  November 2015 evaluation significant for detectable C-peptide of 3.2.  June 2016 his Metformin was increased to 2000 mg daily.  December 2017 he was started on Amaryl 1 mg daily as he was experiencing postprandial hyperglycemia. A1c between April 2018-October 2018 consistently in 6% range. August 2018 PSA was 0.8, creatinine was 0.75, hemoglobin A1c of 6.6, urine for microalbumin was negative, and LDL was 78.  August 2019 hemoglobin A1c was 6.9.  During his previous visit on 8/16/19 SGLT-2 inhibitors and GLP-1 agonists were discussed, but the patient was hesitant to start them as they were new.    Interval History  Feb 2020 HbA1c is  7.1, which is higher from 6.9 on 8/16/19.  He denies any weight change.  He continues to exercise 45 minutes daily.  He denies any vision changes or numbness or tingling.  He also denies increased frequency in urination.  He attributes his increased hemoglobin A1c level to diet and overeating carbohydrates.  He does not have a glucose monitor.  He currently takes metformin 2000 mg daily as well as Amaryl 1 mg daily.    Diabetes monitoring and complications:  CAD: No  Last eye exam results: 2/28/19 - No diabetic retinopathy  Microalbuminuria: 8/16/19 - Unable to detect  Neuropathy: No  HTN: No  On Statin: Yes  On Aspirin: Yes  Depression: No  Erectile dysfunction: No    #2 Hyperlipidemia  Stress test in 2016 was unremarkable.  December 2017 LDL was 44. At that time he was taking Lipitor 40 mg daily. August 2019 LDL was 78.     Interval History  He continues on Lipitor daily.    #3 Goiter  June 2016, he was noted to have a multinodular goiter with several nodules roughly 1 cm in size. October 2016 formal neck ultrasound showed multinodular goiter, with  largest nodules on the right side at 1.1 cm in size (two nodules) and the largest nodule on the left side at 1.2 cm in size.  The patient had declined ultrasound-guided FNA in October 2016. Eval with floor ultrasound in April 2017 showed the following: Right anterior nodule 1.0 x 0.6 x 0.6 cm in size, right posterior/inferior nodule at 1.0 x 0.5 x 1.0 cm in size, left inferior nodule at 0.6 x 0.5 x 0.7 cm in size. April 2018 US showed no interval change in thyroid nodules.    Interval History  He denies palpitations, flushing, constipation/diarrhea, or weight change.  He feels his thyroid function and overall energy level is unchanged.    #4 Paronychia of left great toe    The patient went to [place] in August 2019, and did 5 hikes in 3 days, with an 11 mile hike on the second day which caused a toenail injury. He was seen by orthopedics and was given a week of antibiotics, but did not take them as his paronychia was resolving with hydrogen peroxide cleaning.    Interval History  This issue has since resolved.    #5 Pruritic rash on legs bilaterally  Today, he presents with a pruritic rash, mostly on his lateral left leg.  He feels it is circulation related, as it gets better when he lies down after sleep and worsens when he stands for a long time.  His PCP prescribed a steroid cream to be taken every 2 weeks, which has helped.  He notes that the thickness of the rash has improved although the color has become more red.  He also notes small similar rashes on his right lateral leg as well as his second knuckle on his left hand.    Review of Systems:   Pertinent items are noted in HPI.  All  "other systems are negative.    Active Medications:     Current Outpatient Medications:      aspirin (ASA) 81 MG EC tablet, Take 1 tablet (81 mg) by mouth daily, Disp: 90 tablet, Rfl: 3     atorvastatin (LIPITOR) 40 MG tablet, Take 1 tablet (40 mg) by mouth daily Take one daily, Disp: 90 tablet, Rfl: 3     glimepiride (AMARYL) 1 MG tablet, Take 1 tablet (1 mg) by mouth every morning (before breakfast), Disp: 90 tablet, Rfl: 3     MAGNESIUM PO, Pt. Unsure of dosage, Disp: , Rfl:      metFORMIN (GLUCOPHAGE) 1000 MG tablet, Take 1 tablet (1,000 mg) by mouth 2 times daily (with meals), Disp: 180 tablet, Rfl: 3     Omega-3 Fatty Acids (OMEGA 3 PO), Take by mouth daily, Disp: , Rfl:     Current Facility-Administered Medications:      lidocaine 1 % injection 2 mL, 2 mL, INTRA-ARTICULAR, Once, Camila Baeza MD      Allergies:   Patient has no known allergies.      Past Medical History:  Type 2 diabetes mellitus  Goiter  Coronary arteriosclerosis  Hyperlipidemia  Obesity  Colon adenoma     Past Surgical History:  No past surgical history on file.    Family History:   Diabetes: Mother, father      Social History:   Social History     Tobacco Use     Smoking status: Former Smoker     Packs/day: 1.00     Types: Cigarettes, Cigars     Start date: 10/10/1978     Last attempt to quit: 2011     Years since quittin.1     Smokeless tobacco: Former User   Substance Use Topics     Alcohol use: No     Drug use: No        Physical Exam:   /71   Pulse 82   Ht 1.753 m (5' 9\")   Wt 86 kg (189 lb 11.2 oz)   BMI 28.01 kg/m      Wt Readings from Last 10 Encounters:   19 87.2 kg (192 lb 4.8 oz)   10/26/18 86.6 kg (191 lb)   18 86.7 kg (191 lb 3.2 oz)   17 85.6 kg (188 lb 12.8 oz)   17 85.7 kg (189 lb)   17 86.7 kg (191 lb 3.2 oz)   17 86.6 kg (191 lb)   17 85.4 kg (188 lb 4.8 oz)   16 85.7 kg (189 lb)   16 87.1 kg (192 lb)        GENERAL APPEARANCE: Alert and no " distress  NECK: No lymphadenopathy appreciated  Eyes: No obvious retinopathy noted  Thyroid: No obvious nodules palpated   CV: RRR without M/R/G  Lungs: CTA bilaterally  Neuro: no focal deficits  Skin: No infection in feet, left lower leg elevated reddened rash approximately 4 inches in diameter that blanches upon palpation, right lower leg rash approximately 1 inch in diameter, small rash on second knuckle on the left hand  Mood: Normal   Lymph: neg in neck and supraclavicular area     Data:  Hemoglobin A1c today was 7.1.    Lab Results   Component Value Date     08/16/2019    POTASSIUM 4.2 08/16/2019    CHLORIDE 105 08/16/2019    CO2 30 08/16/2019    ANIONGAP 3 08/16/2019     (H) 08/16/2019    BUN 17 08/16/2019    CR 0.86 08/16/2019    MOUNIKA 9.5 08/16/2019     Lab Results   Component Value Date    GFRESTIMATED >90 08/16/2019    GFRESTIMATED >90 12/22/2017    GFRESTIMATED >90  Non  GFR Calc   11/18/2016    GFRESTBLACK >90 08/16/2019    GFRESTBLACK >90 12/22/2017    GFRESTBLACK >90   GFR Calc   11/18/2016      Lab Results   Component Value Date    MICROL <5 08/16/2019    UMALCR Unable to calculate due to low value 08/16/2019        Lab Results   Component Value Date    A1C 8.3 (A) 06/07/2013    A1C 7.5 (A) 12/23/2011    HEMOGLOBINA1 6.9 (A) 08/16/2019    HEMOGLOBINA1 6.4 (A) 10/26/2018    HEMOGLOBINA1 6.5 (A) 04/27/2018    HEMOGLOBINA1 7.8 (A) 12/22/2017    HEMOGLOBINA1 8.6 (A) 08/11/2017     Lab Results   Component Value Date    CPEPT 4.2 11/18/2016       Lab Results   Component Value Date    CHOL 146 08/16/2019    CHOL 120 12/22/2017    TRIG 154 (H) 08/16/2019    TRIG 197 (H) 12/22/2017    HDL 40 08/16/2019    HDL 37 (L) 12/22/2017    LDL 76 08/16/2019    LDL 44 12/22/2017    NHDL 107 08/16/2019    NHDL 84 12/22/2017       Assessment and Plan:     #1 Diabetes mellitus type 2  His hemoglobin A1c is slightly elevated today at 7.1, increased from 6.9 in August 2019.  His  weight and diet are stable he continues to exercise 45 minutes daily.  He feels the Amaryl and metformin are helpful to his management of diabetes.  He feels he can better control his diabetes through diet and exercise.      We discussed extensively the possibility of starting SGLT2 inhibitor.  We agreed to try a 14-day trial of 10 mg daily Jardiance and stopping the Amaryl, seeing how he tolerates this new medication. Regardless, we will continue with the metformin at 1000 mg twice daily.  Explained the cardiovascular protective effects of Jardiance as well as the possible side effects of UTI, yeast infection, and polyuria.  If he ends up not tolerating the Jardiance, we will continue the Amaryl along with metformin.    #2 Hyperlipidemia  He is to continue on Lipitor.    #3 Goiter  On exam today, we are not concerned for increased size of goiter.  However, we will plan for another thyroid ultrasound in 2020.    #4 Pruritic rash on legs bilaterally, likely post inflammatory hyperpigmentation  In addition to the creams and ointments he is using to treat his rashes and decreased itching sensation, we recommended Sarna as a possible anti-itch cream.  INoted redness may be related to post inflammatory hyperpigmentation.     Follow-up: 6-month follow-up with thyroid ultrasound.  Will call in 1 week to see how he is tolerating the Jardiance.    >50% of 30 minute visit spent in face to face counseling, education and coordination of care related to options for better glycemic control as well as preventing, detecting, and treating hypoglycemia.      Scribe Disclosure:  I, Nelson Kelly, am serving as a scribe to prepare the note for today's visit. All documentation has been reviewed by the aforementioned provider prior to being entered into the official medical record.    Portions of this medical record were completed by a scribe. UPON MY REVIEW AND AUTHENTICATION BY ELECTRONIC SIGNATURE, this confirms (a) I performed the  applicable clinical services, and (b) the record is accurate.      IDhruv MS3, scribed for Dr. Cheri Watson. I, Dr. Cheri Watson reviewed, edited the aforementioned note and personally performed the entire clinical encounter documented in this note.    I was present with the medical student who participated in the service and in the documentation of the note.  I have verified the history and personally performed a physical exam and medical decision making.  I agree with assessment and plan of care as documented in the note.  Signed February 21, 2020    Cheri Watson MD, MS    [phone]

## 2020-02-27 ENCOUNTER — TELEPHONE (OUTPATIENT)
Dept: ENDOCRINOLOGY | Facility: CLINIC | Age: 60
End: 2020-02-27

## 2020-02-27 NOTE — TELEPHONE ENCOUNTER
----- Message from Cheri Waston MD sent at 2/21/2020 12:25 PM CST -----  How is diabetes doing on the jardiance? Does he want to continue?

## 2020-02-27 NOTE — TELEPHONE ENCOUNTER
My chart message sent asking about Jardiance and how he is tolerating it. Nissa Nazario RN on 2/27/2020 at 12:12 PM

## 2020-03-13 ENCOUNTER — TELEPHONE (OUTPATIENT)
Dept: ENDOCRINOLOGY | Facility: CLINIC | Age: 60
End: 2020-03-13

## 2020-03-13 DIAGNOSIS — E11.9 TYPE 2 DIABETES MELLITUS WITHOUT COMPLICATION, WITHOUT LONG-TERM CURRENT USE OF INSULIN (H): ICD-10-CM

## 2020-04-09 DIAGNOSIS — E11.9 TYPE 2 DIABETES MELLITUS WITHOUT COMPLICATION, WITHOUT LONG-TERM CURRENT USE OF INSULIN (H): ICD-10-CM

## 2020-04-14 RX ORDER — EMPAGLIFLOZIN 10 MG/1
TABLET, FILM COATED ORAL
Qty: 30 TABLET | Refills: 5 | Status: SHIPPED | OUTPATIENT
Start: 2020-04-14 | End: 2020-08-07

## 2020-04-14 NOTE — TELEPHONE ENCOUNTER
empagliflozin (JARDIANCE) 10 MG  Last Written Prescription Date:  3/13/20  Last Fill Quantity: 15,   # refills: 1  Last Office Visit : 2/21/20  Future Office visit:  8/7/20    Routing refill request to provider for review/approval because:  CLARIFY    CURRENT DOSE  * SEE TEL CALLS RE MED / DOSE & TOLERANCE

## 2020-07-06 ENCOUNTER — TELEPHONE (OUTPATIENT)
Dept: ENDOCRINOLOGY | Facility: CLINIC | Age: 60
End: 2020-07-06

## 2020-07-06 DIAGNOSIS — E11.9 TYPE 2 DIABETES MELLITUS WITHOUT COMPLICATION, WITHOUT LONG-TERM CURRENT USE OF INSULIN (H): ICD-10-CM

## 2020-07-06 DIAGNOSIS — Z71.84 TRAVEL ADVICE ENCOUNTER: Primary | ICD-10-CM

## 2020-07-07 DIAGNOSIS — Z71.84 TRAVEL ADVICE ENCOUNTER: ICD-10-CM

## 2020-07-07 DIAGNOSIS — E11.9 TYPE 2 DIABETES MELLITUS WITHOUT COMPLICATION, WITHOUT LONG-TERM CURRENT USE OF INSULIN (H): ICD-10-CM

## 2020-07-07 LAB
ANION GAP SERPL CALCULATED.3IONS-SCNC: 5 MMOL/L (ref 3–14)
BUN SERPL-MCNC: 15 MG/DL (ref 7–30)
CALCIUM SERPL-MCNC: 9.6 MG/DL (ref 8.5–10.1)
CHLORIDE SERPL-SCNC: 106 MMOL/L (ref 94–109)
CHOLEST SERPL-MCNC: 130 MG/DL
CO2 SERPL-SCNC: 29 MMOL/L (ref 20–32)
CREAT SERPL-MCNC: 0.79 MG/DL (ref 0.66–1.25)
GFR SERPL CREATININE-BSD FRML MDRD: >90 ML/MIN/{1.73_M2}
GLUCOSE SERPL-MCNC: 148 MG/DL (ref 70–99)
HBA1C MFR BLD: 6.8 % (ref 0–5.6)
HDLC SERPL-MCNC: 33 MG/DL
LDLC SERPL CALC-MCNC: 68 MG/DL
NONHDLC SERPL-MCNC: 97 MG/DL
POTASSIUM SERPL-SCNC: 4.4 MMOL/L (ref 3.4–5.3)
SODIUM SERPL-SCNC: 140 MMOL/L (ref 133–144)
TRIGL SERPL-MCNC: 144 MG/DL
TSH SERPL DL<=0.005 MIU/L-ACNC: 0.85 MU/L (ref 0.4–4)

## 2020-07-07 PROCEDURE — 86769 SARS-COV-2 COVID-19 ANTIBODY: CPT | Mod: 90 | Performed by: INTERNAL MEDICINE

## 2020-07-07 PROCEDURE — 99000 SPECIMEN HANDLING OFFICE-LAB: CPT | Performed by: INTERNAL MEDICINE

## 2020-07-07 PROCEDURE — 84443 ASSAY THYROID STIM HORMONE: CPT | Performed by: INTERNAL MEDICINE

## 2020-07-07 PROCEDURE — 36415 COLL VENOUS BLD VENIPUNCTURE: CPT | Performed by: INTERNAL MEDICINE

## 2020-07-07 PROCEDURE — 83036 HEMOGLOBIN GLYCOSYLATED A1C: CPT | Performed by: INTERNAL MEDICINE

## 2020-07-07 PROCEDURE — 80061 LIPID PANEL: CPT | Performed by: INTERNAL MEDICINE

## 2020-07-07 PROCEDURE — 80048 BASIC METABOLIC PNL TOTAL CA: CPT | Performed by: INTERNAL MEDICINE

## 2020-07-07 NOTE — LETTER
July 11, 2020        Chema Mccullough  [address] [place] [address]      COVID-19 Antibody Screen   Date Value Ref Range Status   07/07/2020 Negative  Final     Comment:     No COVID-19 antibodies detected.  Patients within 10 days of symptom onset for   COVID-19 may not produce sufficient levels of detectable antibodies.    Immunocompromised COVID-19 patients may take longer to develop antibodies.       COVID-19 Antibody, IgG Titer   Date Value Ref Range Status   07/07/2020 Not Applicable  Final     Comment:     Qualitative screen for total antibodies to COVID-19 (SARS-CoV-2) with   semi-quantitative measurement of IgG COVID-19 antibodies by endpoint titer.    COVID-19 antibodies may be elevated due to a past or current infection.  Negative results do not rule out COVID-19 infection.  Results from antibody   testing should not be used as the sole basis to diagnose or exclude SARS-CoV-2   infection or to inform infection status.  COVID-19 PCR test should be ordered   if current infection is suspected.  False positive results may occur in rare   cases due to cross-reacting antibodies.  This test was developed and its performance characteristics determined by the   [facility] ([facility]),   which is regulated under CLIA as qualified to perform high-complexity testing.    This test has not been reviewed by the FDA.  Testing performed by [facility], [facility], [address], [address], [place] [address]         No results found for: COVAB      You have tested NEGATIVE for COVID-19 antibodies. This suggests you have not had or been exposed to COVID-19. But it does not mean that for sure.     The test finds antibodies in most people 10 days after they get sick. For some people, it takes longer than 10 days for antibodies to show up. Others may never show antibodies against COVID-19, especially  if they have weak immune systems.    If you have COVID-19 symptoms now, please stay home and away from others.     What is antibody testing?    This is a kind of blood test. We take a small sample of your blood, and then test it for something called  antibodies.      Your body makes antibodies to fight infection. If your blood has antibodies for a certain germ, it means you ve been infected with that germ in the past.     Sometimes, antibodies stay in your body for years after you ve had the infection. They can be there even if the germ didn t make you sick. They are a sign that your body fought off the infection.    Will this test find antibodies in everyone who s had COVID-19?    No. The test finds antibodies in most people 10 days after they get sick. For some people, it takes longer than 10 days for antibodies to show up. Others may never show antibodies against COVID-19, especially if they have weak immune systems.    What does it mean if the test finds COVID-19 antibodies?    If we find these antibodies, it suggests:     This person has had the virus.     Their body s immune system fought the virus.     We don t know if this will help protect someone from getting COVID-19 again. Scientists are still learning about this.    What are the signs of COVID-19?    Signs of COVID-19 can appear from 2 to 14 days (up to 2 weeks) after you re infected. Some people have no symptoms or only mild symptoms. Others get very sick. The most common symptoms are:          Cough    Shortness of breath or trouble breathing  Or at least 2 of these symptoms:    Fever    Chills    Repeated shaking with chills    Muscle pain    Headache    Sore throat    Losing your sense of taste or smell    You may have other symptoms. Please contact your doctor or clinic for any symptoms that worry you.    Where can I get more information?     To learn the [place] s guidelines for staying home, please visit the [place] Department of Health  website at [email or web address]/[email or web address]    To learn more about COVID-19 and how to care for yourself at home, please visit the CDC website at https://www.cdc.gov/coronavirus/2019-ncov/about/steps-when-sick.html    For more options for care at [facility], please visit our website at [email or web address]    [place] Department of [facility] ([facility]) COVID-19 Hotline:  [phone]

## 2020-07-09 LAB
COVID-19 SPIKE RBD ABY TITER: NORMAL
COVID-19 SPIKE RBD ABY: NEGATIVE

## 2020-07-10 ENCOUNTER — TELEPHONE (OUTPATIENT)
Dept: ENDOCRINOLOGY | Facility: CLINIC | Age: 60
End: 2020-07-10

## 2020-07-10 NOTE — LETTER
Patient:  Chema Mccullough  :   1960  MRN:     [id number]        Mr.Georgios Mccullough  [address] [place] [address]        July 10, 2020    Dear Chema    Here are your labs which look good - the COVID testing is negative.    If you have any questions, please feel free to contact my nurse at [phone] select option #3 for triage nurse  or  option #1 for scheduling related questions.    Regards    Cheri Watson MD    Orders Only on 2020   Component Date Value Ref Range Status     Cholesterol 2020 130  <200 mg/dL Final     Triglycerides 2020 144  <150 mg/dL Final    Fasting specimen     HDL Cholesterol 2020 33* >39 mg/dL Final     LDL Cholesterol Calculated 2020 68  <100 mg/dL Final    Desirable:       <100 mg/dl     Non HDL Cholesterol 2020 97  <130 mg/dL Final     Sodium 2020 140  133 - 144 mmol/L Final     Potassium 2020 4.4  3.4 - 5.3 mmol/L Final     Chloride 2020 106  94 - 109 mmol/L Final     Carbon Dioxide 2020 29  20 - 32 mmol/L Final     Anion Gap 2020 5  3 - 14 mmol/L Final     Glucose 2020 148* 70 - 99 mg/dL Final    Fasting specimen     Urea Nitrogen 2020 15  7 - 30 mg/dL Final     Creatinine 2020 0.79  0.66 - 1.25 mg/dL Final     GFR Estimate 2020 >90  >60 mL/min/[1.73_m2] Final    Comment: Non  GFR Calc  Starting 2018, serum creatinine based estimated GFR (eGFR) will be   calculated using the Chronic Kidney Disease Epidemiology Collaboration   (CKD-EPI) equation.       GFR Estimate If Black 2020 >90  >60 mL/min/[1.73_m2] Final    Comment:  GFR Calc  Starting 2018, serum creatinine based estimated GFR (eGFR) will be   calculated using the Chronic Kidney Disease Epidemiology Collaboration   (CKD-EPI) equation.       Calcium 2020 9.6  8.5 - 10.1 mg/dL Final     TSH 2020 0.85  0.40 - 4.00 mU/L Final     Hemoglobin A1C 2020  6.8* 0 - 5.6 % Final    Comment: Normal <5.7% Prediabetes 5.7-6.4%  Diabetes 6.5% or higher - adopted from ADA   consensus guidelines.       COVID-19 Antibody Screen 07/07/2020 Negative   Final    Comment: No COVID-19 antibodies detected.  Patients within 10 days of symptom onset for   COVID-19 may not produce sufficient levels of detectable antibodies.    Immunocompromised COVID-19 patients may take longer to develop antibodies.       COVID-19 Antibody, IgG Titer 07/07/2020 Not Applicable   Final    Comment: Qualitative screen for total antibodies to COVID-19 (SARS-CoV-2) with   semi-quantitative measurement of IgG COVID-19 antibodies by endpoint titer.    COVID-19 antibodies may be elevated due to a past or current infection.  Negative results do not rule out COVID-19 infection.  Results from antibody   testing should not be used as the sole basis to diagnose or exclude SARS-CoV-2   infection or to inform infection status.  COVID-19 PCR test should be ordered   if current infection is suspected.  False positive results may occur in rare   cases due to cross-reacting antibodies.  This test was developed and its performance characteristics determined by the   [facility] ([facility]),   which is regulated under CLIA as qualified to perform high-complexity testing.    This test has not been reviewed by the FDA.  Testing performed by [facility], [facility], [address], [address], [place] [address]

## 2020-08-06 ENCOUNTER — ANCILLARY PROCEDURE (OUTPATIENT)
Dept: ULTRASOUND IMAGING | Facility: CLINIC | Age: 60
End: 2020-08-06
Attending: INTERNAL MEDICINE
Payer: COMMERCIAL

## 2020-08-06 DIAGNOSIS — E04.9 GOITER: ICD-10-CM

## 2020-08-06 NOTE — PROGRESS NOTES
"Patients Glucose Data was Patient stated they are not currently checking their glucose      Chema Mccullough is a 60 year old male who is being evaluated via a billable video visit.      The patient has been notified of following:     \"This video visit will be conducted via a call between you and your physician/provider. We have found that certain health care needs can be provided without the need for an in-person physical exam.  This service lets us provide the care you need with a video conversation.  If a prescription is necessary we can send it directly to your pharmacy.  If lab work is needed we can place an order for that and you can then stop by our lab to have the test done at a later time.    Video visits are billed at different rates depending on your insurance coverage.  Please reach out to your insurance provider with any questions.    If during the course of the call the physician/provider feels a video visit is not appropriate, you will not be charged for this service.\"    Patient has given verbal consent for Video visit? Yes  How would you like to obtain your AVS? MyChart  If you are dropped from the video visit, the video invite should be resent to: Text to cell phone: [phone]  Will anyone else be joining your video visit? No  "

## 2020-08-07 ENCOUNTER — VIRTUAL VISIT (OUTPATIENT)
Dept: ENDOCRINOLOGY | Facility: CLINIC | Age: 60
End: 2020-08-07
Payer: COMMERCIAL

## 2020-08-07 DIAGNOSIS — E11.9 TYPE 2 DIABETES MELLITUS WITHOUT COMPLICATION, WITHOUT LONG-TERM CURRENT USE OF INSULIN (H): ICD-10-CM

## 2020-08-07 RX ORDER — ATORVASTATIN CALCIUM 40 MG/1
40 TABLET, FILM COATED ORAL DAILY
Qty: 90 TABLET | Refills: 3 | Status: SHIPPED | OUTPATIENT
Start: 2020-08-07 | End: 2021-02-26

## 2020-08-07 NOTE — LETTER
"8/7/2020       RE: Chema Mccullough  [address] [place] [address]     Dear Colleague,    Thank you for referring your patient, Chema Mccullough, to the [facility] ENDOCRINOLOGY at [facility]. Please see a copy of my visit note below.    Patients Glucose Data was Patient stated they are not currently checking their glucose      Chema Mccullough is a 60 year old male who is being evaluated via a billable video visit.      The patient has been notified of following:     \"This video visit will be conducted via a call between you and your physician/provider. We have found that certain health care needs can be provided without the need for an in-person physical exam.  This service lets us provide the care you need with a video conversation.  If a prescription is necessary we can send it directly to your pharmacy.  If lab work is needed we can place an order for that and you can then stop by our lab to have the test done at a later time.    Video visits are billed at different rates depending on your insurance coverage.  Please reach out to your insurance provider with any questions.    If during the course of the call the physician/provider feels a video visit is not appropriate, you will not be charged for this service.\"    Patient has given verbal consent for Video visit? Yes  How would you like to obtain your AVS? MyChart  If you are dropped from the video visit, the video invite should be resent to: Text to cell phone: [phone]  Will anyone else be joining your video visit? No    [facility]  Endocrinology  Cheri Watson MD  8/7/2020     Chief Complaint:   Diabetes    Due to the COVID 19 pandemic this visit was converted to a virtual  visit in order to help prevent spread of infection in this high risk patient and the general population .      Called pt by john at noon - no response. Tried doximity video at 12:02 -and was able to connect with patient by " [employer] video by 1211.  End time 1236, including US thyroid review. ,  total time 25 minutes.    History of Present Illness:   Chema Mccullough is a 60 year old male with a history of type 2 diabetes and goiter who presents for follow up of diabetes.    #1 Diabetes mellitus type 2  The patient was diagnosed with prediabetes in 2004 and then in 2009 found to have fasting blood sugar of 184 with A1c of 7.5%.  He was started on Metformin XR in February 2009, dose increased to 500 mg twice daily in March 2013.  A1c from 2013 - 2016 was consistently in the 8% range.  November 2015 evaluation significant for detectable C-peptide of 3.2.  June 2016 his Metformin was increased to 2000 mg daily.  December 2017 he was started on Amaryl 1 mg daily as he was experiencing postprandial hyperglycemia. A1c between April 2018-October 2018 consistently in 6% range. August 2018 PSA was 0.8, creatinine was 0.75, hemoglobin A1c of 6.6, urine for microalbumin was negative, and LDL was 78.  August 2019 hemoglobin A1c was 6.9.  During his previous visit on 8/16/19 SGLT-2 inhibitors and GLP-1 agonists were discussed, but the patient was hesitant to start them as they were new.  August 2019 hemoglobin A1c of 6.9.  February 2020 hemoglobin A1c is 7.1.    Interval History  Since his last visit in February 2020, I had the patient try Jardiance at 10 mg daily and stop his glimepiride.  He reports tolerance of this program.  He reports that the cost is acceptable.  He reports that his home blood sugars generally range between 140-160.  Currently he is on metformin 1000 mg twice daily and Jardiance 10 mg daily.  He is also on Lipitor and daily aspirin.  July 2020 hemoglobin A1c is 6.8, LDL 68, TSH 0.85.    Diabetes monitoring and complications:  CAD: No  Last eye exam results: 2/28/19 - No diabetic retinopathy  Microalbuminuria: 8/16/19 - Unable to detect  Neuropathy: No  HTN: No  On Statin: Yes  On Aspirin: Yes  Depression: No  Erectile  dysfunction: No    #2 Hyperlipidemia  Stress test in 2016 was unremarkable. December 2017 LDL was 44. At that time he was taking Lipitor 40 mg daily. August 2019 LDL was 78.     Interval History  He continues on Lipitor daily.    #3 Goiter  June 2016, he was noted to have a multinodular goiter with several nodules roughly 1 cm in size. October 2016 formal neck ultrasound showed multinodular goiter, with  largest nodules on the right side at 1.1 cm in size (two nodules) and the largest nodule on the left side at 1.2 cm in size.  The patient had declined ultrasound-guided FNA in October 2016. Eval with floor ultrasound in April 2017 showed the following: Right anterior nodule 1.0 x 0.6 x 0.6 cm in size, right posterior/inferior nodule at 1.0 x 0.5 x 1.0 cm in size, left inferior nodule at 0.6 x 0.5 x 0.7 cm in size. April 2018 US showed no interval change in thyroid nodules.    Interval History  Neck ultrasound performed in August 2020 continues to show small nodules bilaterally which are unchanged in size.    #4  Pruritic rash on legs bilaterally  Today, he presents with a pruritic rash, mostly on his lateral left leg.  He feels it is circulation related, as it gets better when he lies down after sleep and worsens when he stands for a long time.  His PCP prescribed a steroid cream to be taken every 2 weeks, which has helped.  He notes that the thickness of the rash has improved although the color has become more red.  He also notes small similar rashes on his right lateral leg as well as his second knuckle on his left hand.    Interval history: Per patient report, this is basically improved.    Review of Systems:   Pertinent items are noted in HPI.  All other systems are negative.    Active Medications:     Current Outpatient Medications:      aspirin (ASA) 81 MG EC tablet, Take 1 tablet (81 mg) by mouth daily, Disp: 90 tablet, Rfl: 3     atorvastatin 40 MG PO tablet, Take 1 tablet (40 mg) by mouth daily Take one  "daily, Disp: 90 tablet, Rfl: 3     JARDIANCE 10 MG TABS tablet, TAKE 1 TABLET BY MOUTH EVERY DAY, Disp: 30 tablet, Rfl: 5     MAGNESIUM PO, Pt. Unsure of dosage, Disp: , Rfl:      metFORMIN 1000 MG PO tablet, Take 1 tablet (1,000 mg) by mouth 2 times daily (with meals), Disp: 180 tablet, Rfl: 3     Omega-3 Fatty Acids (OMEGA 3 PO), Take by mouth daily, Disp: , Rfl:      glimepiride (AMARYL) 1 MG tablet, Take 1 tablet (1 mg) by mouth every morning (before breakfast) (Patient not taking: Reported on 2020), Disp: 90 tablet, Rfl: 3    Current Facility-Administered Medications:      lidocaine 1 % injection 2 mL, 2 mL, INTRA-ARTICULAR, Once, Camila Baeza MD      Allergies:   Patient has no known allergies.      Past Medical History:  Type 2 diabetes mellitus  Goiter  Coronary arteriosclerosis  Hyperlipidemia  Obesity  Colon adenoma     Past Surgical History:  No past surgical history on file.    Family History:   Diabetes: Mother, father      Social History:   Social History     Tobacco Use     Smoking status: Former Smoker     Packs/day: 1.00     Types: Cigarettes, Cigars     Start date: 10/10/1978     Last attempt to quit: 2011     Years since quittin.6     Smokeless tobacco: Former User   Substance Use Topics     Alcohol use: No     Drug use: No        Physical Exam:   GENERAL: Healthy, alert and no distress\",\"EYES: Eyes grossly normal to inspection.  No discharge or erythema, or obvious scleral/conjunctival abnormalities.\",\"RESP: No audible wheeze, cough, or visible cyanosis.  No visible retractions or increased work of breathing.  \",\"SKIN: Visible skin clear. No significant rash, abnormal pigmentation or lesions.\",\"NEURO: Cranial nerves grossly intact.  Mentation and speech appropriate for age.\",\"PSYCH: Mentation appears normal, affect normal/bright, judgement and insight intact, normal speech and appearance well-groomed.       Data:  No visits with results within 1 Week(s) from this visit. "   Latest known visit with results is:   Orders Only on 07/07/2020   Component Date Value Ref Range Status     Cholesterol 07/07/2020 130  <200 mg/dL Final     Triglycerides 07/07/2020 144  <150 mg/dL Final    Fasting specimen     HDL Cholesterol 07/07/2020 33* >39 mg/dL Final     LDL Cholesterol Calculated 07/07/2020 68  <100 mg/dL Final    Desirable:       <100 mg/dl     Non HDL Cholesterol 07/07/2020 97  <130 mg/dL Final     Sodium 07/07/2020 140  133 - 144 mmol/L Final     Potassium 07/07/2020 4.4  3.4 - 5.3 mmol/L Final     Chloride 07/07/2020 106  94 - 109 mmol/L Final     Carbon Dioxide 07/07/2020 29  20 - 32 mmol/L Final     Anion Gap 07/07/2020 5  3 - 14 mmol/L Final     Glucose 07/07/2020 148* 70 - 99 mg/dL Final    Fasting specimen     Urea Nitrogen 07/07/2020 15  7 - 30 mg/dL Final     Creatinine 07/07/2020 0.79  0.66 - 1.25 mg/dL Final     GFR Estimate 07/07/2020 >90  >60 mL/min/[1.73_m2] Final    Comment: Non  GFR Calc  Starting 12/18/2018, serum creatinine based estimated GFR (eGFR) will be   calculated using the Chronic Kidney Disease Epidemiology Collaboration   (CKD-EPI) equation.       GFR Estimate If Black 07/07/2020 >90  >60 mL/min/[1.73_m2] Final    Comment:  GFR Calc  Starting 12/18/2018, serum creatinine based estimated GFR (eGFR) will be   calculated using the Chronic Kidney Disease Epidemiology Collaboration   (CKD-EPI) equation.       Calcium 07/07/2020 9.6  8.5 - 10.1 mg/dL Final     TSH 07/07/2020 0.85  0.40 - 4.00 mU/L Final     Hemoglobin A1C 07/07/2020 6.8* 0 - 5.6 % Final    Comment: Normal <5.7% Prediabetes 5.7-6.4%  Diabetes 6.5% or higher - adopted from ADA   consensus guidelines.       COVID-19 Antibody Screen 07/07/2020 Negative   Final    Comment: No COVID-19 antibodies detected.  Patients within 10 days of symptom onset for   COVID-19 may not produce sufficient levels of detectable antibodies.    Immunocompromised COVID-19 patients may take  longer to develop antibodies.       COVID-19 Antibody, IgG Titer 07/07/2020 Not Applicable   Final    Comment: Qualitative screen for total antibodies to COVID-19 (SARS-CoV-2) with   semi-quantitative measurement of IgG COVID-19 antibodies by endpoint titer.    COVID-19 antibodies may be elevated due to a past or current infection.  Negative results do not rule out COVID-19 infection.  Results from antibody   testing should not be used as the sole basis to diagnose or exclude SARS-CoV-2   infection or to inform infection status.  COVID-19 PCR test should be ordered   if current infection is suspected.  False positive results may occur in rare   cases due to cross-reacting antibodies.  This test was developed and its performance characteristics determined by the   [facility]),   which is regulated under CLIA as qualified to perform high-complexity testing.    This test has not been reviewed by the FDA.  Testing performed by [facility], [facility], [address], [place] [address]         US THYROID 8/6/2020 12:56 PM     COMPARISON: Ultrasound thyroid 4/27/2018     HISTORY: Goiter     FINDINGS:   Thyroid parenchyma: Mildly heterogeneous  The right lobe of the thyroid measures: 2.3 x 2.4 x 6.3 cm  The left lobe of the thyroid measures: 2 x 2.2 x 5.2 cm  The thyroid isthmus measures: 3 mm     Right Lobe:  Nodule 1:  Location: Right mid lobe  Size: 0.9 x 0.7 x 1 cm, previously measuring 0.9 x 0.6 x 1 cm  Composition: Solid or almost completely solid (2 points)  Echogenicity: Hyperechoic or isoechoic (1 point)  Shape: Wider than tall (0 points)  Margin: Smooth (0 points)  Echogenic Foci: None or large comet tail artifact (0 points)  Stability: No significant change in size  TIRADS: TR3 (3 points)      Nodule 2:  Location: Mid right lobe  Size: 0.6 x 1 x 1 cm,  previously 0.8 x 0.5 x 0.9 centimeters  Composition: Solid or almost completely solid (2 points)  Echogenicity: Hyperechoic or isoechoic (1 point)  Shape: Wider than tall (0 points)  Margin: Smooth (0 points)  Echogenic Foci: None or large comet tail artifact (0 points)  Stability: No significant change in size  TIRADS: TR3 (3 points)      Nodule 3:  Location: Inferior right lobe  Size: 0.9 x 0.7 x 1 cm, previously 0.9 x 0.6 x 0.8 cm  Composition: Solid or almost completely solid (2 points)  Echogenicity: Hyperechoic or isoechoic (1 point)  Shape: Wider than tall (0 points)  Margin: Ill-defined (0 points)  Echogenic Foci: None or large comet tail artifact (0 points)  Stability: No significant change in size  TIRADS: 3     Left Lobe:     Nodule 1:  Location: Mid left lobe  Size: 0.7 x 0.5 x 0.8 cm, previously 0.8 x 0.5 x 0.8 cm  Composition: Solid or almost completely solid (2 points)  Echogenicity: Hyperechoic or isoechoic (1 point)  Shape: Wider than tall (0 points)  Margin: Smooth (0 points)  Echogenic Foci: None or large comet tail artifact (0 points)  Stability: No significant change in size  TIRADS: TR3 (3 points)         Nodule 2:  Location: Mid left lobe  Size: 0.8 x 0.7 x 1 cm, previously 0.8 x 0.6 x 1 cm  Composition: Solid or almost completely solid (2 points)  Echogenicity: Hypoechoic (2 points)  Shape: Wider than tall (0 points)  Margin: Ill-defined (0 points)  Echogenic Foci: None or large comet tail artifact (0 points)  Stability: No significant change in size  TIRADS: TR4 (4-6 points)      Nodule 3:  Location: Inferior left lobe  Size: 0.6 x 1 x 0.8 cm, previously 0.6 x 1 x 0.8 cm  Composition: Solid or almost completely solid (2 points)  Echogenicity: Hyperechoic or isoechoic (1 point)  Shape: Wider than tall (0 points)  Margin: Smooth (0 points)  Echogenic Foci: None or large comet tail artifact (0 points)  Stability: No significant change in size  TIRADS: TR3 (3 points)                                                                        Impression:No significant change in small bilateral thyroid nodules,  compared to ultrasound dated 4/27/2018.     ACR TI-RADS recommendations  TR2 (2 points) & TR1 (0 points) -No FNA or follow-up  TR3 (3 points) - FNA if ? 2.5cm, follow-up if 1.5 -2.4 cm in 1, 3 and  5 years  TR4 (4-6 points) - FNA if ? 1.5cm, follow-up if 1 -1.4 cm in 1, 2, 3  and 5 years  TR5 (?7 points) - FNA if ? 1cm, follow-up if 0.5 -0.9 cm every year  for 5 years      I have personally reviewed the examination and initial interpretation  and I agree with the findings.     CLEVELAND HEARD MD    Assessment and Plan:  #1 Diabetes mellitus type 2  The patient continues with dietary lifestyle changes.  Encourage patient.  July 2020 hemoglobin A1c had improved to 6.8.  I think that the Jardiance is an excellent alternative to the glimepiride, considering that cost is not an issue and he is tolerating the Jardiance.  This is due Jardiance's effects on cardioprotection, renal protection, and less stress on the pancreas.  Refills given for Jardiance at 10 mg daily.  Patient also continue with metformin at 1000 mg twice daily.     #2 Hyperlipidemia  He is to continue on Lipitor.  Refills given.    #3 Goiter  Thyroid ultrasound personally reviewed.  This continues to show small stable nodules.  Will consider repeat thyroid ultrasound in 2022.    #4 Pruritic rash on legs bilaterally, likely post inflammatory hyperpigmentation  This has improved per patient report     Follow-up: Return visit planned in 6 months.  We may consider rechecking urine for microalbumin at that time.      Called pt by john at noon - no response. Tried dox[employer]ax[employer]ity video at 12:02 -and was able to connect with patient by [employer] video by 1211.  End time 1236, including US thyroid review. ,  total time 25 minutes.      Again, thank you for allowing me to participate in the care of your patient.      Sincerely,    Cheri Watson MD

## 2020-08-07 NOTE — PATIENT INSTRUCTIONS
Continue with Jardiance at 10 mg daily-let me know if cost is an issue, continue with metformin at 1000 mg twice daily, continue with dietary lifestyle changes.

## 2020-08-07 NOTE — PROGRESS NOTES
[place]  Endocrinology  Cheri Watson MD  8/7/2020     Chief Complaint:   Diabetes    Due to the COVID 19 pandemic this visit was converted to a virtual  visit in order to help prevent spread of infection in this high risk patient and the general population .      Called pt by john at noon - no response. Tried [employer] video at 12:02 -and was able to connect with patient by [employer] video by 1211.  End time 1236, including US thyroid review. ,  total time 25 minutes.    History of Present Illness:   Chema Mccullough is a 60 year old male with a history of type 2 diabetes and goiter who presents for follow up of diabetes.    #1 Diabetes mellitus type 2  The patient was diagnosed with prediabetes in 2004 and then in 2009 found to have fasting blood sugar of 184 with A1c of 7.5%.  He was started on Metformin XR in February 2009, dose increased to 500 mg twice daily in March 2013.  A1c from 2013 - 2016 was consistently in the 8% range.  November 2015 evaluation significant for detectable C-peptide of 3.2.  June 2016 his Metformin was increased to 2000 mg daily.  December 2017 he was started on Amaryl 1 mg daily as he was experiencing postprandial hyperglycemia. A1c between April 2018-October 2018 consistently in 6% range. August 2018 PSA was 0.8, creatinine was 0.75, hemoglobin A1c of 6.6, urine for microalbumin was negative, and LDL was 78.  August 2019 hemoglobin A1c was 6.9.  During his previous visit on 8/16/19 SGLT-2 inhibitors and GLP-1 agonists were discussed, but the patient was hesitant to start them as they were new.  August 2019 hemoglobin A1c of 6.9.  February 2020 hemoglobin A1c is 7.1.    Interval History  Since his last visit in February 2020, I had the patient try Jardiance at 10 mg daily and stop his glimepiride.  He reports tolerance of this program.  He reports that the cost is acceptable.  He reports that his home blood sugars generally range between 140-160.  Currently he is on metformin  1000 mg twice daily and Jardiance 10 mg daily.  He is also on Lipitor and daily aspirin.  July 2020 hemoglobin A1c is 6.8, LDL 68, TSH 0.85.    Diabetes monitoring and complications:  CAD: No  Last eye exam results: 2/28/19 - No diabetic retinopathy  Microalbuminuria: 8/16/19 - Unable to detect  Neuropathy: No  HTN: No  On Statin: Yes  On Aspirin: Yes  Depression: No  Erectile dysfunction: No    #2 Hyperlipidemia  Stress test in 2016 was unremarkable. December 2017 LDL was 44. At that time he was taking Lipitor 40 mg daily. August 2019 LDL was 78.     Interval History  He continues on Lipitor daily.    #3 Goiter  June 2016, he was noted to have a multinodular goiter with several nodules roughly 1 cm in size. October 2016 formal neck ultrasound showed multinodular goiter, with  largest nodules on the right side at 1.1 cm in size (two nodules) and the largest nodule on the left side at 1.2 cm in size.  The patient had declined ultrasound-guided FNA in October 2016. Eval with floor ultrasound in April 2017 showed the following: Right anterior nodule 1.0 x 0.6 x 0.6 cm in size, right posterior/inferior nodule at 1.0 x 0.5 x 1.0 cm in size, left inferior nodule at 0.6 x 0.5 x 0.7 cm in size. April 2018 US showed no interval change in thyroid nodules.    Interval History  Neck ultrasound performed in August 2020 continues to show small nodules bilaterally which are unchanged in size.    #4  Pruritic rash on legs bilaterally  Today, he presents with a pruritic rash, mostly on his lateral left leg.  He feels it is circulation related, as it gets better when he lies down after sleep and worsens when he stands for a long time.  His PCP prescribed a steroid cream to be taken every 2 weeks, which has helped.  He notes that the thickness of the rash has improved although the color has become more red.  He also notes small similar rashes on his right lateral leg as well as his second knuckle on his left hand.    Interval history:  "Per patient report, this is basically improved.    Review of Systems:   Pertinent items are noted in HPI.  All other systems are negative.    Active Medications:     Current Outpatient Medications:      aspirin (ASA) 81 MG EC tablet, Take 1 tablet (81 mg) by mouth daily, Disp: 90 tablet, Rfl: 3     atorvastatin 40 MG PO tablet, Take 1 tablet (40 mg) by mouth daily Take one daily, Disp: 90 tablet, Rfl: 3     JARDIANCE 10 MG TABS tablet, TAKE 1 TABLET BY MOUTH EVERY DAY, Disp: 30 tablet, Rfl: 5     MAGNESIUM PO, Pt. Unsure of dosage, Disp: , Rfl:      metFORMIN 1000 MG PO tablet, Take 1 tablet (1,000 mg) by mouth 2 times daily (with meals), Disp: 180 tablet, Rfl: 3     Omega-3 Fatty Acids (OMEGA 3 PO), Take by mouth daily, Disp: , Rfl:      glimepiride (AMARYL) 1 MG tablet, Take 1 tablet (1 mg) by mouth every morning (before breakfast) (Patient not taking: Reported on 2020), Disp: 90 tablet, Rfl: 3    Current Facility-Administered Medications:      lidocaine 1 % injection 2 mL, 2 mL, INTRA-ARTICULAR, Once, Camila Baeza MD      Allergies:   Patient has no known allergies.      Past Medical History:  Type 2 diabetes mellitus  Goiter  Coronary arteriosclerosis  Hyperlipidemia  Obesity  Colon adenoma     Past Surgical History:  No past surgical history on file.    Family History:   Diabetes: Mother, father      Social History:   Social History     Tobacco Use     Smoking status: Former Smoker     Packs/day: 1.00     Types: Cigarettes, Cigars     Start date: 10/10/1978     Last attempt to quit: 2011     Years since quittin.6     Smokeless tobacco: Former User   Substance Use Topics     Alcohol use: No     Drug use: No        Physical Exam:   GENERAL: Healthy, alert and no distress\",\"EYES: Eyes grossly normal to inspection.  No discharge or erythema, or obvious scleral/conjunctival abnormalities.\",\"RESP: No audible wheeze, cough, or visible cyanosis.  No visible retractions or increased work of " "breathing.  \",\"SKIN: Visible skin clear. No significant rash, abnormal pigmentation or lesions.\",\"NEURO: Cranial nerves grossly intact.  Mentation and speech appropriate for age.\",\"PSYCH: Mentation appears normal, affect normal/bright, judgement and insight intact, normal speech and appearance well-groomed.       Data:  No visits with results within 1 Week(s) from this visit.   Latest known visit with results is:   Orders Only on 07/07/2020   Component Date Value Ref Range Status     Cholesterol 07/07/2020 130  <200 mg/dL Final     Triglycerides 07/07/2020 144  <150 mg/dL Final    Fasting specimen     HDL Cholesterol 07/07/2020 33* >39 mg/dL Final     LDL Cholesterol Calculated 07/07/2020 68  <100 mg/dL Final    Desirable:       <100 mg/dl     Non HDL Cholesterol 07/07/2020 97  <130 mg/dL Final     Sodium 07/07/2020 140  133 - 144 mmol/L Final     Potassium 07/07/2020 4.4  3.4 - 5.3 mmol/L Final     Chloride 07/07/2020 106  94 - 109 mmol/L Final     Carbon Dioxide 07/07/2020 29  20 - 32 mmol/L Final     Anion Gap 07/07/2020 5  3 - 14 mmol/L Final     Glucose 07/07/2020 148* 70 - 99 mg/dL Final    Fasting specimen     Urea Nitrogen 07/07/2020 15  7 - 30 mg/dL Final     Creatinine 07/07/2020 0.79  0.66 - 1.25 mg/dL Final     GFR Estimate 07/07/2020 >90  >60 mL/min/[1.73_m2] Final    Comment: Non  GFR Calc  Starting 12/18/2018, serum creatinine based estimated GFR (eGFR) will be   calculated using the Chronic Kidney Disease Epidemiology Collaboration   (CKD-EPI) equation.       GFR Estimate If Black 07/07/2020 >90  >60 mL/min/[1.73_m2] Final    Comment:  GFR Calc  Starting 12/18/2018, serum creatinine based estimated GFR (eGFR) will be   calculated using the Chronic Kidney Disease Epidemiology Collaboration   (CKD-EPI) equation.       Calcium 07/07/2020 9.6  8.5 - 10.1 mg/dL Final     TSH 07/07/2020 0.85  0.40 - 4.00 mU/L Final     Hemoglobin A1C 07/07/2020 6.8* 0 - 5.6 % Final    " Comment: Normal <5.7% Prediabetes 5.7-6.4%  Diabetes 6.5% or higher - adopted from ADA   consensus guidelines.       COVID-19 Antibody Screen 07/07/2020 Negative   Final    Comment: No COVID-19 antibodies detected.  Patients within 10 days of symptom onset for   COVID-19 may not produce sufficient levels of detectable antibodies.    Immunocompromised COVID-19 patients may take longer to develop antibodies.       COVID-19 Antibody, IgG Titer 07/07/2020 Not Applicable   Final    Comment: Qualitative screen for total antibodies to COVID-19 (SARS-CoV-2) with   semi-quantitative measurement of IgG COVID-19 antibodies by endpoint titer.    COVID-19 antibodies may be elevated due to a past or current infection.  Negative results do not rule out COVID-19 infection.  Results from antibody   testing should not be used as the sole basis to diagnose or exclude SARS-CoV-2   infection or to inform infection status.  COVID-19 PCR test should be ordered   if current infection is suspected.  False positive results may occur in rare   cases due to cross-reacting antibodies.  This test was developed and its performance characteristics determined by the   [facility] ([facility]),   which is regulated under CLIA as qualified to perform high-complexity testing.    This test has not been reviewed by the FDA.  Testing performed by [facility], [facility], [address], [address], [place] [address]          THYROID 8/6/2020 12:56 PM     COMPARISON: Ultrasound thyroid 4/27/2018     HISTORY: Goiter     FINDINGS:   Thyroid parenchyma: Mildly heterogeneous  The right lobe of the thyroid measures: 2.3 x 2.4 x 6.3 cm  The left lobe of the thyroid measures: 2 x 2.2 x 5.2 cm  The thyroid isthmus measures: 3 mm     Right Lobe:  Nodule 1:  Location: Right mid lobe  Size: 0.9 x 0.7 x 1 cm, previously measuring 0.9  x 0.6 x 1 cm  Composition: Solid or almost completely solid (2 points)  Echogenicity: Hyperechoic or isoechoic (1 point)  Shape: Wider than tall (0 points)  Margin: Smooth (0 points)  Echogenic Foci: None or large comet tail artifact (0 points)  Stability: No significant change in size  TIRADS: TR3 (3 points)      Nodule 2:  Location: Mid right lobe  Size: 0.6 x 1 x 1 cm, previously 0.8 x 0.5 x 0.9 centimeters  Composition: Solid or almost completely solid (2 points)  Echogenicity: Hyperechoic or isoechoic (1 point)  Shape: Wider than tall (0 points)  Margin: Smooth (0 points)  Echogenic Foci: None or large comet tail artifact (0 points)  Stability: No significant change in size  TIRADS: TR3 (3 points)      Nodule 3:  Location: Inferior right lobe  Size: 0.9 x 0.7 x 1 cm, previously 0.9 x 0.6 x 0.8 cm  Composition: Solid or almost completely solid (2 points)  Echogenicity: Hyperechoic or isoechoic (1 point)  Shape: Wider than tall (0 points)  Margin: Ill-defined (0 points)  Echogenic Foci: None or large comet tail artifact (0 points)  Stability: No significant change in size  TIRADS: 3     Left Lobe:     Nodule 1:  Location: Mid left lobe  Size: 0.7 x 0.5 x 0.8 cm, previously 0.8 x 0.5 x 0.8 cm  Composition: Solid or almost completely solid (2 points)  Echogenicity: Hyperechoic or isoechoic (1 point)  Shape: Wider than tall (0 points)  Margin: Smooth (0 points)  Echogenic Foci: None or large comet tail artifact (0 points)  Stability: No significant change in size  TIRADS: TR3 (3 points)         Nodule 2:  Location: Mid left lobe  Size: 0.8 x 0.7 x 1 cm, previously 0.8 x 0.6 x 1 cm  Composition: Solid or almost completely solid (2 points)  Echogenicity: Hypoechoic (2 points)  Shape: Wider than tall (0 points)  Margin: Ill-defined (0 points)  Echogenic Foci: None or large comet tail artifact (0 points)  Stability: No significant change in size  TIRADS: TR4 (4-6 points)      Nodule 3:  Location: Inferior left  lobe  Size: 0.6 x 1 x 0.8 cm, previously 0.6 x 1 x 0.8 cm  Composition: Solid or almost completely solid (2 points)  Echogenicity: Hyperechoic or isoechoic (1 point)  Shape: Wider than tall (0 points)  Margin: Smooth (0 points)  Echogenic Foci: None or large comet tail artifact (0 points)  Stability: No significant change in size  TIRADS: TR3 (3 points)                                                                       Impression:No significant change in small bilateral thyroid nodules,  compared to ultrasound dated 4/27/2018.     ACR TI-RADS recommendations  TR2 (2 points) & TR1 (0 points) -No FNA or follow-up  TR3 (3 points) - FNA if ? 2.5cm, follow-up if 1.5 -2.4 cm in 1, 3 and  5 years  TR4 (4-6 points) - FNA if ? 1.5cm, follow-up if 1 -1.4 cm in 1, 2, 3  and 5 years  TR5 (?7 points) - FNA if ? 1cm, follow-up if 0.5 -0.9 cm every year  for 5 years      I have personally reviewed the examination and initial interpretation  and I agree with the findings.     CLEVELAND HEARD MD    Assessment and Plan:  #1 Diabetes mellitus type 2  The patient continues with dietary lifestyle changes.  Encourage patient.  July 2020 hemoglobin A1c had improved to 6.8.  I think that the Jardiance is an excellent alternative to the glimepiride, considering that cost is not an issue and he is tolerating the Jardiance.  This is due Jardiance's effects on cardioprotection, renal protection, and less stress on the pancreas.  Refills given for Jardiance at 10 mg daily.  Patient also continue with metformin at 1000 mg twice daily.     #2 Hyperlipidemia  He is to continue on Lipitor.  Refills given.    #3 Goiter  Thyroid ultrasound personally reviewed.  This continues to show small stable nodules.  Will consider repeat thyroid ultrasound in 2022.    #4 Pruritic rash on legs bilaterally, likely post inflammatory hyperpigmentation  This has improved per patient report     Follow-up: Return visit planned in 6 months.  We may consider  rechecking urine for microalbumin at that time.      Called pt by john at noon - no response. Tried [employer] video at 12:02 -and was able to connect with patient by [employer] video by 1211.  End time 1236, including US thyroid review. ,  total time 25 minutes.

## 2020-11-13 ENCOUNTER — TELEPHONE (OUTPATIENT)
Dept: ENDOCRINOLOGY | Facility: CLINIC | Age: 60
End: 2020-11-13

## 2020-11-13 DIAGNOSIS — E11.9 TYPE 2 DIABETES MELLITUS WITHOUT COMPLICATION, WITHOUT LONG-TERM CURRENT USE OF INSULIN (H): ICD-10-CM

## 2020-11-17 NOTE — TELEPHONE ENCOUNTER
aspirin (ASA) 81 MG EC tablet  Last Written Prescription Date:  11/19/2019  Last Fill Quantity: 90,   # refills: 3  Last Office Visit : 8/7/2020  Future Office visit:  None  90 Tabs, 3 Refills sent to pharm 11/17/2020      Lela Quezada RN  Central Triage Red Flags/Med Refills

## 2020-11-22 ENCOUNTER — HEALTH MAINTENANCE LETTER (OUTPATIENT)
Age: 60
End: 2020-11-22

## 2020-12-31 DIAGNOSIS — E11.9 TYPE 2 DIABETES MELLITUS WITHOUT COMPLICATION, WITHOUT LONG-TERM CURRENT USE OF INSULIN (H): ICD-10-CM

## 2020-12-31 RX ORDER — GLIMEPIRIDE 1 MG/1
1 TABLET ORAL
Qty: 90 TABLET | Refills: 3 | Status: CANCELLED | OUTPATIENT
Start: 2020-12-31

## 2021-01-04 NOTE — TELEPHONE ENCOUNTER
Call to [facility] pharmacy after receiving paper refill request, message left of glimepiride having been discontinued 8/7/20 and replaced by Jardiance, for which the pharmacy has an active prescription.  Refill for glimepiride denied

## 2021-01-15 ENCOUNTER — HEALTH MAINTENANCE LETTER (OUTPATIENT)
Age: 61
End: 2021-01-15

## 2021-02-26 ENCOUNTER — VIRTUAL VISIT (OUTPATIENT)
Dept: ENDOCRINOLOGY | Facility: CLINIC | Age: 61
End: 2021-02-26
Payer: COMMERCIAL

## 2021-02-26 DIAGNOSIS — E11.9 TYPE 2 DIABETES MELLITUS WITHOUT COMPLICATION, WITHOUT LONG-TERM CURRENT USE OF INSULIN (H): ICD-10-CM

## 2021-02-26 PROCEDURE — 99214 OFFICE O/P EST MOD 30 MIN: CPT | Mod: 95 | Performed by: INTERNAL MEDICINE

## 2021-02-26 RX ORDER — ATORVASTATIN CALCIUM 40 MG/1
40 TABLET, FILM COATED ORAL DAILY
Qty: 90 TABLET | Refills: 3 | Status: SHIPPED | OUTPATIENT
Start: 2021-02-26 | End: 2021-08-06

## 2021-02-26 NOTE — LETTER
2/26/2021       RE: Chema Mccullough  [address] [place] [address]     Dear Colleague,    Thank you for referring your patient, Chema Mccullough, to the [facility] [place] [facility]. Please see a copy of my visit note below.    Outcome for 02/26/21 9:49 AM :Patient stated they are not currently checking their glucose     Chema is a 60 year old who is being evaluated via a billable video visit.      How would you like to obtain your AVS? MyChart     Will anyone else be joining your video visit? No    Video Start Time: , started at 11:30 by john, changed to phone call at 11:48 due to technical issues, stopped at 11:55,  documentation time 5  minutes, total time 30  minutes    [facility]  Endocrinology  Cheri Watson MD  2/26/21     Chief Complaint:   Diabetes    History of Present Illness:   Chema Mccullough is a 60 year old male with a history of type 2 diabetes and goiter who presents for follow up of diabetes.    #1 Diabetes mellitus type 2  The patient was diagnosed with prediabetes in 2004 and then in 2009 found to have fasting blood sugar of 184 with A1c of 7.5%.  He was started on Metformin XR in February 2009, dose increased to 500 mg twice daily in March 2013.  A1c from 2013 - 2016 was consistently in the 8% range.  November 2015 evaluation significant for detectable C-peptide of 3.2.  June 2016 his Metformin was increased to 2000 mg daily.  December 2017 he was started on Amaryl 1 mg daily as he was experiencing postprandial hyperglycemia. A1c between April 2018-October 2018 consistently in 6% range. August 2018 PSA was 0.8, creatinine was 0.75, hemoglobin A1c of 6.6, urine for microalbumin was negative, and LDL was 78.  August 2019 hemoglobin A1c was 6.9.  During his previous visit on 8/16/19 SGLT-2 inhibitors and GLP-1 agonists were discussed, but the patient was hesitant to start them as they  were new.  August 2019 hemoglobin A1c of 6.9.  February 2020 hemoglobin A1c is 7.1.    In February 2020, I had the patient try Jardiance at 10 mg daily and stop his glimepiride.  He reports tolerance of this program.  He reports that the cost is acceptable.  He reports that his home blood sugars generally range between 140-160.  Currently he is on metformin 1000 mg twice daily and Jardiance 10 mg daily.  He is also on Lipitor and daily aspirin.  July 2020 hemoglobin A1c is 6.8, LDL 68, TSH 0.85.    Interval history: December 2020 urine for protein was negative, creatinine was 0.82, hemoglobin A1c of 6.9, LDL was 80.  He reports tolerance of his Jardiance at 10 mg daily and Metformin at 1000 mg twice daily.  Cost is acceptable.    Diabetes monitoring and complications:  CAD: No  Last eye exam results: 2/28/19 - No diabetic retinopathy  Microalbuminuria: 8/16/19 - Unable to detect  Neuropathy: No  HTN: No  On Statin: Yes  On Aspirin: Yes  Depression: No  Erectile dysfunction: No    #2 Hyperlipidemia  Stress test in 2016 was unremarkable. December 2017 LDL was 44. At that time he was taking Lipitor 40 mg daily. August 2019 LDL was 78.     Interval History  He continues on Lipitor daily. December 2020 LDL is 80.    #3 Goiter  June 2016, he was noted to have a multinodular goiter with several nodules roughly 1 cm in size. October 2016 formal neck ultrasound showed multinodular goiter, with  largest nodules on the right side at 1.1 cm in size (two nodules) and the largest nodule on the left side at 1.2 cm in size.  The patient had declined ultrasound-guided FNA in October 2016. Eval with floor ultrasound in April 2017 showed the following: Right anterior nodule 1.0 x 0.6 x 0.6 cm in size, right posterior/inferior nodule at 1.0 x 0.5 x 1.0 cm in size, left inferior nodule at 0.6 x 0.5 x 0.7 cm in size. April 2018 US showed no interval change in thyroid nodules. Neck ultrasound performed in August 2020 continues to show  "small nodules bilaterally which are unchanged in size.    Interval History  Not discussed at current visit.    #4 interested in Covid vaccine  The patient is interested in the Covid vaccine.  Certainly he does have comorbidities (type 2 diabetes) which would warrant the Covid vaccine when it becomes available for people with comorbidities.    Review of Systems:   Pertinent items are noted in HPI.  All other systems are negative.    Active Medications:     Current Outpatient Medications:      aspirin (ASA) 81 MG EC tablet, Take 1 tablet (81 mg) by mouth daily, Disp: 90 tablet, Rfl: 3     atorvastatin (LIPITOR) 40 MG tablet, Take 1 tablet (40 mg) by mouth daily Take one daily, Disp: 90 tablet, Rfl: 3     empagliflozin (JARDIANCE) 10 MG TABS tablet, Take 1 tablet (10 mg) by mouth daily, Disp: 90 tablet, Rfl: 4     MAGNESIUM PO, Pt. Unsure of dosage, Disp: , Rfl:      metFORMIN (GLUCOPHAGE) 1000 MG tablet, Take 1 tablet (1,000 mg) by mouth 2 times daily (with meals), Disp: 180 tablet, Rfl: 3     Omega-3 Fatty Acids (OMEGA 3 PO), Take by mouth daily, Disp: , Rfl:     Current Facility-Administered Medications:      lidocaine 1 % injection 2 mL, 2 mL, INTRA-ARTICULAR, Once, Camila Baeza MD      Allergies:   Patient has no known allergies.      Past Medical History:  Type 2 diabetes mellitus  Goiter  Coronary arteriosclerosis  Hyperlipidemia  Obesity  Colon adenoma     Past Surgical History:  No past surgical history on file.    Family History:   Diabetes: Mother, father      Social History:   Social History     Tobacco Use     Smoking status: Former Smoker     Packs/day: 1.00     Types: Cigarettes, Cigars     Start date: 10/10/1978     Quit date: 1/1/2011     Years since quitting: 10.1     Smokeless tobacco: Former User   Substance Use Topics     Alcohol use: No     Drug use: No        Physical Exam:   GENERAL: Healthy, alert and no distress\",\"EYES: Eyes grossly normal to inspection.  No discharge or erythema, or " "obvious scleral/conjunctival abnormalities.\",\"RESP: No audible wheeze, cough, or visible cyanosis.  No visible retractions or increased work of breathing.  \",\"SKIN: Visible skin clear. No significant rash, abnormal pigmentation or lesions.\",\"NEURO: Cranial nerves grossly intact.  Mentation and speech appropriate for age.\",\"PSYCH: Mentation appears normal, affect normal/bright, judgement and insight intact, normal speech and appearance well-groomed.       Data:    December 2020 urine for protein was negative, creatinine was 0.82, hemoglobin A1c of 6.9, LDL was 80.      Assessment and Plan:  #1 Diabetes mellitus type 2  Refills given for Jardiance at 10 mg daily.  Patient also continue with metformin at 1000 mg twice daily.  Discussed with patient the relative benefits of Metformin and Jardiance.  I do think the patient would benefit from adding exercise to his program (1 hour/day).  Suggestions given.    #2 Hyperlipidemia  He is to continue on Lipitor.  December 2020 LDL satisfactory.  Refills given.    #3 Goiter  August 2020 neck ultrasound showed small stable thyroid nodules.  Will consider repeat ultrasound in 2022.      #4 interested in Covid vaccination  The patient is interested in Covid vaccination.  Discussed with patient options including referencing the [place] Department of Health for the latest update.  Given the limited supply of vaccine, I think he would be best going with the [place] Department of Health recommendations rather than through [place] at this time.    Follow-up: Return visit planned in 6 months.  We will have patient check the labs as ordered below prior to the visit    Orders Placed This Encounter   Procedures     Hemoglobin A1c     Basic metabolic panel     TSH     T4 free     T3 total     Video Start Time: , started at 11:30 by john, changed to phone call at 11:48 due to technical issues, stopped at 11:55,  documentation time 5  minutes, total time 30  minutes      Again, thank " you for allowing me to participate in the care of your patient.      Sincerely,    Cheri Watson MD

## 2021-02-26 NOTE — PROGRESS NOTES
Outcome for 02/26/21 9:49 AM :Patient stated they are not currently checking their glucose     Chema is a 60 year old who is being evaluated via a billable video visit.      How would you like to obtain your AVS? MyChart     Will anyone else be joining your video visit? No    Video Start Time: {video visit start/end time for provider to select:[id number]}    [facility]  Endocrinology  Cheri Watson MD  2/26/21     Chief Complaint:   Diabetes    History of Present Illness:   Chema Mccullough is a 60 year old male with a history of type 2 diabetes and goiter who presents for follow up of diabetes.    #1 Diabetes mellitus type 2  The patient was diagnosed with prediabetes in 2004 and then in 2009 found to have fasting blood sugar of 184 with A1c of 7.5%.  He was started on Metformin XR in February 2009, dose increased to 500 mg twice daily in March 2013.  A1c from 2013 - 2016 was consistently in the 8% range.  November 2015 evaluation significant for detectable C-peptide of 3.2.  June 2016 his Metformin was increased to 2000 mg daily.  December 2017 he was started on Amaryl 1 mg daily as he was experiencing postprandial hyperglycemia. A1c between April 2018-October 2018 consistently in 6% range. August 2018 PSA was 0.8, creatinine was 0.75, hemoglobin A1c of 6.6, urine for microalbumin was negative, and LDL was 78.  August 2019 hemoglobin A1c was 6.9.  During his previous visit on 8/16/19 SGLT-2 inhibitors and GLP-1 agonists were discussed, but the patient was hesitant to start them as they were new.  August 2019 hemoglobin A1c of 6.9.  February 2020 hemoglobin A1c is 7.1.    In February 2020, I had the patient try Jardiance at 10 mg daily and stop his glimepiride.  He reports tolerance of this program.  He reports that the cost is acceptable.  He reports that his home blood sugars generally range between 140-160.  Currently he is on metformin 1000 mg twice daily and Jardiance 10 mg daily.  He is also on Lipitor and  daily aspirin.  July 2020 hemoglobin A1c is 6.8, LDL 68, TSH 0.85.    Interval history:     Diabetes monitoring and complications:  CAD: No  Last eye exam results: 2/28/19 - No diabetic retinopathy  Microalbuminuria: 8/16/19 - Unable to detect  Neuropathy: No  HTN: No  On Statin: Yes  On Aspirin: Yes  Depression: No  Erectile dysfunction: No    #2 Hyperlipidemia  Stress test in 2016 was unremarkable. December 2017 LDL was 44. At that time he was taking Lipitor 40 mg daily. August 2019 LDL was 78.     Interval History  He continues on Lipitor daily.    #3 Goiter  June 2016, he was noted to have a multinodular goiter with several nodules roughly 1 cm in size. October 2016 formal neck ultrasound showed multinodular goiter, with  largest nodules on the right side at 1.1 cm in size (two nodules) and the largest nodule on the left side at 1.2 cm in size.  The patient had declined ultrasound-guided FNA in October 2016. Eval with floor ultrasound in April 2017 showed the following: Right anterior nodule 1.0 x 0.6 x 0.6 cm in size, right posterior/inferior nodule at 1.0 x 0.5 x 1.0 cm in size, left inferior nodule at 0.6 x 0.5 x 0.7 cm in size. April 2018 US showed no interval change in thyroid nodules.    Interval History  Neck ultrasound performed in August 2020 continues to show small nodules bilaterally which are unchanged in size.    #4  Pruritic rash on legs bilaterally  Today, he presents with a pruritic rash, mostly on his lateral left leg.  He feels it is circulation related, as it gets better when he lies down after sleep and worsens when he stands for a long time.  His PCP prescribed a steroid cream to be taken every 2 weeks, which has helped.  He notes that the thickness of the rash has improved although the color has become more red.  He also notes small similar rashes on his right lateral leg as well as his second knuckle on his left hand.    Interval history: Per patient report, this is basically  "improved.    Review of Systems:   Pertinent items are noted in HPI.  All other systems are negative.    Active Medications:     Current Outpatient Medications:      aspirin (ASA) 81 MG EC tablet, Take 1 tablet (81 mg) by mouth daily, Disp: 90 tablet, Rfl: 3     atorvastatin (LIPITOR) 40 MG tablet, Take 1 tablet (40 mg) by mouth daily Take one daily, Disp: 90 tablet, Rfl: 3     empagliflozin (JARDIANCE) 10 MG TABS tablet, Take 1 tablet (10 mg) by mouth daily, Disp: 90 tablet, Rfl: 4     MAGNESIUM PO, Pt. Unsure of dosage, Disp: , Rfl:      metFORMIN (GLUCOPHAGE) 1000 MG tablet, Take 1 tablet (1,000 mg) by mouth 2 times daily (with meals), Disp: 180 tablet, Rfl: 3     Omega-3 Fatty Acids (OMEGA 3 PO), Take by mouth daily, Disp: , Rfl:     Current Facility-Administered Medications:      lidocaine 1 % injection 2 mL, 2 mL, INTRA-ARTICULAR, Once, Camila Baeza MD      Allergies:   Patient has no known allergies.      Past Medical History:  Type 2 diabetes mellitus  Goiter  Coronary arteriosclerosis  Hyperlipidemia  Obesity  Colon adenoma     Past Surgical History:  No past surgical history on file.    Family History:   Diabetes: Mother, father      Social History:   Social History     Tobacco Use     Smoking status: Former Smoker     Packs/day: 1.00     Types: Cigarettes, Cigars     Start date: 10/10/1978     Quit date: 1/1/2011     Years since quitting: 10.1     Smokeless tobacco: Former User   Substance Use Topics     Alcohol use: No     Drug use: No        Physical Exam:   GENERAL: Healthy, alert and no distress\",\"EYES: Eyes grossly normal to inspection.  No discharge or erythema, or obvious scleral/conjunctival abnormalities.\",\"RESP: No audible wheeze, cough, or visible cyanosis.  No visible retractions or increased work of breathing.  \",\"SKIN: Visible skin clear. No significant rash, abnormal pigmentation or lesions.\",\"NEURO: Cranial nerves grossly intact.  Mentation and speech appropriate for age.\",\"PSYCH: " Mentation appears normal, affect normal/bright, judgement and insight intact, normal speech and appearance well-groomed.       Data:  No visits with results within 1 Week(s) from this visit.   Latest known visit with results is:   Orders Only on 07/07/2020   Component Date Value Ref Range Status     Cholesterol 07/07/2020 130  <200 mg/dL Final     Triglycerides 07/07/2020 144  <150 mg/dL Final    Fasting specimen     HDL Cholesterol 07/07/2020 33* >39 mg/dL Final     LDL Cholesterol Calculated 07/07/2020 68  <100 mg/dL Final    Desirable:       <100 mg/dl     Non HDL Cholesterol 07/07/2020 97  <130 mg/dL Final     Sodium 07/07/2020 140  133 - 144 mmol/L Final     Potassium 07/07/2020 4.4  3.4 - 5.3 mmol/L Final     Chloride 07/07/2020 106  94 - 109 mmol/L Final     Carbon Dioxide 07/07/2020 29  20 - 32 mmol/L Final     Anion Gap 07/07/2020 5  3 - 14 mmol/L Final     Glucose 07/07/2020 148* 70 - 99 mg/dL Final    Fasting specimen     Urea Nitrogen 07/07/2020 15  7 - 30 mg/dL Final     Creatinine 07/07/2020 0.79  0.66 - 1.25 mg/dL Final     GFR Estimate 07/07/2020 >90  >60 mL/min/[1.73_m2] Final    Comment: Non  GFR Calc  Starting 12/18/2018, serum creatinine based estimated GFR (eGFR) will be   calculated using the Chronic Kidney Disease Epidemiology Collaboration   (CKD-EPI) equation.       GFR Estimate If Black 07/07/2020 >90  >60 mL/min/[1.73_m2] Final    Comment:  GFR Calc  Starting 12/18/2018, serum creatinine based estimated GFR (eGFR) will be   calculated using the Chronic Kidney Disease Epidemiology Collaboration   (CKD-EPI) equation.       Calcium 07/07/2020 9.6  8.5 - 10.1 mg/dL Final     TSH 07/07/2020 0.85  0.40 - 4.00 mU/L Final     Hemoglobin A1C 07/07/2020 6.8* 0 - 5.6 % Final    Comment: Normal <5.7% Prediabetes 5.7-6.4%  Diabetes 6.5% or higher - adopted from ADA   consensus guidelines.       COVID-19 Antibody Screen 07/07/2020 Negative   Final    Comment: No COVID-19  antibodies detected.  Patients within 10 days of symptom onset for   COVID-19 may not produce sufficient levels of detectable antibodies.    Immunocompromised COVID-19 patients may take longer to develop antibodies.       COVID-19 Antibody, IgG Titer 07/07/2020 Not Applicable   Final    Comment: Qualitative screen for total antibodies to COVID-19 (SARS-CoV-2) with   semi-quantitative measurement of IgG COVID-19 antibodies by endpoint titer.    COVID-19 antibodies may be elevated due to a past or current infection.  Negative results do not rule out COVID-19 infection.  Results from antibody   testing should not be used as the sole basis to diagnose or exclude SARS-CoV-2   infection or to inform infection status.  COVID-19 PCR test should be ordered   if current infection is suspected.  False positive results may occur in rare   cases due to cross-reacting antibodies.  This test was developed and its performance characteristics determined by the   [facility] ([facility]),   which is regulated under CLIA as qualified to perform high-complexity testing.    This test has not been reviewed by the FDA.  Testing performed by [facility], [facility], [address], [address], [place] [address]           Assessment and Plan:  #1 Diabetes mellitus type 2  The patient continues with dietary lifestyle changes.  Encourage patient.  July 2020 hemoglobin A1c had improved to 6.8.  I think that the Jardiance is an excellent alternative to the glimepiride, considering that cost is not an issue and he is tolerating the Jardiance.  This is due Jardiance's effects on cardioprotection, renal protection, and less stress on the pancreas.  Refills given for Jardiance at 10 mg daily.  Patient also continue with metformin at 1000 mg twice daily.     #2 Hyperlipidemia  He is to continue on Lipitor.  Refills  given.    #3 Goiter  Thyroid ultrasound personally reviewed.  This continues to show small stable nodules.  Will consider repeat thyroid ultrasound in 2022.    #4 Pruritic rash on legs bilaterally, likely post inflammatory hyperpigmentation  This has improved per patient report     Follow-up: Return visit planned in 6 months.  We may consider rechecking urine for microalbumin at that time.

## 2021-02-26 NOTE — PATIENT INSTRUCTIONS
Exercise about 1/2 hour outside and 1/2 hour rowing      We appreciate your assistance in coordinating your healthcare.     Please upload your insulin pump, blood sugar meter and/or continuous glucose monitor at home 1-2 days before your next diabetes-related appointment.   This will allow your provider to review your  data before your scheduled virtual visit.    To ask a question to your Endocrine care team, please send them a [employer] message, or reach them by phone at [phone]     To expedite your medication refill(s), please contact your pharmacy and have them   fax a refill request to: [phone].  *Please allow 3 business days for routine medication refills.  *Please allow 5 business days for controlled substance medication refills.    For after-hours urgent Endocrine issues, that do not require 911, please dial [phone], and ask to speak with the Endocrinologist On-Call

## 2021-02-26 NOTE — PROGRESS NOTES
Outcome for 02/26/21 9:49 AM :Patient stated they are not currently checking their glucose     Chema is a 60 year old who is being evaluated via a billable video visit.      How would you like to obtain your AVS? MyChart     Will anyone else be joining your video visit? No    Video Start Time: , started at 11:30 by john, changed to phone call at 11:48 due to technical issues, stopped at 11:55,  documentation time 5  minutes, total time 30  minutes    [facility]  Endocrinology  Cheri Watson MD  2/26/21     Chief Complaint:   Diabetes    History of Present Illness:   Chema Mccullough is a 60 year old male with a history of type 2 diabetes and goiter who presents for follow up of diabetes.    #1 Diabetes mellitus type 2  The patient was diagnosed with prediabetes in 2004 and then in 2009 found to have fasting blood sugar of 184 with A1c of 7.5%.  He was started on Metformin XR in February 2009, dose increased to 500 mg twice daily in March 2013.  A1c from 2013 - 2016 was consistently in the 8% range.  November 2015 evaluation significant for detectable C-peptide of 3.2.  June 2016 his Metformin was increased to 2000 mg daily.  December 2017 he was started on Amaryl 1 mg daily as he was experiencing postprandial hyperglycemia. A1c between April 2018-October 2018 consistently in 6% range. August 2018 PSA was 0.8, creatinine was 0.75, hemoglobin A1c of 6.6, urine for microalbumin was negative, and LDL was 78.  August 2019 hemoglobin A1c was 6.9.  During his previous visit on 8/16/19 SGLT-2 inhibitors and GLP-1 agonists were discussed, but the patient was hesitant to start them as they were new.  August 2019 hemoglobin A1c of 6.9.  February 2020 hemoglobin A1c is 7.1.    In February 2020, I had the patient try Jardiance at 10 mg daily and stop his glimepiride.  He reports tolerance of this program.  He reports that the cost is acceptable.  He reports that his home blood sugars generally range between 140-160.   Currently he is on metformin 1000 mg twice daily and Jardiance 10 mg daily.  He is also on Lipitor and daily aspirin.  July 2020 hemoglobin A1c is 6.8, LDL 68, TSH 0.85.    Interval history: December 2020 urine for protein was negative, creatinine was 0.82, hemoglobin A1c of 6.9, LDL was 80.  He reports tolerance of his Jardiance at 10 mg daily and Metformin at 1000 mg twice daily.  Cost is acceptable.    Diabetes monitoring and complications:  CAD: No  Last eye exam results: 2/28/19 - No diabetic retinopathy  Microalbuminuria: 8/16/19 - Unable to detect  Neuropathy: No  HTN: No  On Statin: Yes  On Aspirin: Yes  Depression: No  Erectile dysfunction: No    #2 Hyperlipidemia  Stress test in 2016 was unremarkable. December 2017 LDL was 44. At that time he was taking Lipitor 40 mg daily. August 2019 LDL was 78.     Interval History  He continues on Lipitor daily. December 2020 LDL is 80.    #3 Goiter  June 2016, he was noted to have a multinodular goiter with several nodules roughly 1 cm in size. October 2016 formal neck ultrasound showed multinodular goiter, with  largest nodules on the right side at 1.1 cm in size (two nodules) and the largest nodule on the left side at 1.2 cm in size.  The patient had declined ultrasound-guided FNA in October 2016. Eval with floor ultrasound in April 2017 showed the following: Right anterior nodule 1.0 x 0.6 x 0.6 cm in size, right posterior/inferior nodule at 1.0 x 0.5 x 1.0 cm in size, left inferior nodule at 0.6 x 0.5 x 0.7 cm in size. April 2018 US showed no interval change in thyroid nodules. Neck ultrasound performed in August 2020 continues to show small nodules bilaterally which are unchanged in size.    Interval History  Not discussed at current visit.    #4 interested in Covid vaccine  The patient is interested in the Covid vaccine.  Certainly he does have comorbidities (type 2 diabetes) which would warrant the Covid vaccine when it becomes available for people with  "comorbidities.    Review of Systems:   Pertinent items are noted in HPI.  All other systems are negative.    Active Medications:     Current Outpatient Medications:      aspirin (ASA) 81 MG EC tablet, Take 1 tablet (81 mg) by mouth daily, Disp: 90 tablet, Rfl: 3     atorvastatin (LIPITOR) 40 MG tablet, Take 1 tablet (40 mg) by mouth daily Take one daily, Disp: 90 tablet, Rfl: 3     empagliflozin (JARDIANCE) 10 MG TABS tablet, Take 1 tablet (10 mg) by mouth daily, Disp: 90 tablet, Rfl: 4     MAGNESIUM PO, Pt. Unsure of dosage, Disp: , Rfl:      metFORMIN (GLUCOPHAGE) 1000 MG tablet, Take 1 tablet (1,000 mg) by mouth 2 times daily (with meals), Disp: 180 tablet, Rfl: 3     Omega-3 Fatty Acids (OMEGA 3 PO), Take by mouth daily, Disp: , Rfl:     Current Facility-Administered Medications:      lidocaine 1 % injection 2 mL, 2 mL, INTRA-ARTICULAR, Once, Camila Baeza MD      Allergies:   Patient has no known allergies.      Past Medical History:  Type 2 diabetes mellitus  Goiter  Coronary arteriosclerosis  Hyperlipidemia  Obesity  Colon adenoma     Past Surgical History:  No past surgical history on file.    Family History:   Diabetes: Mother, father      Social History:   Social History     Tobacco Use     Smoking status: Former Smoker     Packs/day: 1.00     Types: Cigarettes, Cigars     Start date: 10/10/1978     Quit date: 1/1/2011     Years since quitting: 10.1     Smokeless tobacco: Former User   Substance Use Topics     Alcohol use: No     Drug use: No        Physical Exam:   GENERAL: Healthy, alert and no distress\",\"EYES: Eyes grossly normal to inspection.  No discharge or erythema, or obvious scleral/conjunctival abnormalities.\",\"RESP: No audible wheeze, cough, or visible cyanosis.  No visible retractions or increased work of breathing.  \",\"SKIN: Visible skin clear. No significant rash, abnormal pigmentation or lesions.\",\"NEURO: Cranial nerves grossly intact.  Mentation and speech appropriate for " "age.\",\"PSYCH: Mentation appears normal, affect normal/bright, judgement and insight intact, normal speech and appearance well-groomed.       Data:    December 2020 urine for protein was negative, creatinine was 0.82, hemoglobin A1c of 6.9, LDL was 80.      Assessment and Plan:  #1 Diabetes mellitus type 2  Refills given for Jardiance at 10 mg daily.  Patient also continue with metformin at 1000 mg twice daily.  Discussed with patient the relative benefits of Metformin and Jardiance.  I do think the patient would benefit from adding exercise to his program (1 hour/day).  Suggestions given.    #2 Hyperlipidemia  He is to continue on Lipitor.  December 2020 LDL satisfactory.  Refills given.    #3 Goiter  August 2020 neck ultrasound showed small stable thyroid nodules.  Will consider repeat ultrasound in 2022.      #4 interested in Covid vaccination  The patient is interested in Covid vaccination.  Discussed with patient options including referencing the [place] Department of Health for the latest update.  Given the limited supply of vaccine, I think he would be best going with the [facility] recommendations rather than through [place] at this time.    Follow-up: Return visit planned in 6 months.  We will have patient check the labs as ordered below prior to the visit    Orders Placed This Encounter   Procedures     Hemoglobin A1c     Basic metabolic panel     TSH     T4 free     T3 total     Video Start Time: , started at 11:30 by john, changed to phone call at 11:48 due to technical issues, stopped at 11:55,  documentation time 5  minutes, total time 30  minutes  "

## 2021-03-15 ENCOUNTER — TELEPHONE (OUTPATIENT)
Dept: ENDOCRINOLOGY | Facility: CLINIC | Age: 61
End: 2021-03-15

## 2021-03-15 NOTE — TELEPHONE ENCOUNTER
[facility] Call Center    Phone Message    May a detailed message be left on voicemail: yes     Reason for Call: Medication Question or concern regarding medication   Prescription Clarification  Name of Medication: empagliflozin (JARDIANCE) 10 MG TABS tablet  Prescribing Provider: Dr. Watson   Pharmacy: N/A   What on the order needs clarification? Besides the jardiance card, patient's wife was wondering if there's something else to help lower the cost of this prescription. Please call patient's wife to advise. Thank you.           Action Taken: Message routed to:  [facility] ([facility] Endo    Travel Screening: Not Applicable

## 2021-03-16 ENCOUNTER — TELEPHONE (OUTPATIENT)
Dept: ENDOCRINOLOGY | Facility: CLINIC | Age: 61
End: 2021-03-16

## 2021-03-16 NOTE — TELEPHONE ENCOUNTER
Prior Authorization Specialty Medication Request    Medication/Dose:   empagliflozin (JARDIANCE) 10 MG TABS tablet 90 tablet 4 2/26/2021  No   Sig - Route: Take 1 tablet (10 mg) by mouth daily - Oral   Sent to pharmacy as: Empagliflozin 10 MG Oral Tablet (Jardiance)       ICD code (if different than what is on RX):    Previously Tried and Failed:      Important Lab Values:   Rationale: The patient was diagnosed with prediabetes in 2004 and then in 2009 found to have fasting blood sugar of 184 with A1c of 7.5%.  He was started on Metformin XR in February 2009, dose increased to 500 mg twice daily in March 2013.  A1c from 2013 - 2016 was consistently in the 8% range.  November 2015 evaluation significant for detectable C-peptide of 3.2.  June 2016 his Metformin was increased to 2000 mg daily.  December 2017 he was started on Amaryl 1 mg daily as he was experiencing postprandial hyperglycemia. A1c between April 2018-October 2018 consistently in 6% range. August 2018 PSA was 0.8, creatinine was 0.75, hemoglobin A1c of 6.6, urine for microalbumin was negative, and LDL was 78.  August 2019 hemoglobin A1c was 6.9.  During his previous visit on 8/16/19 SGLT-2 inhibitors and GLP-1 agonists were discussed, but the patient was hesitant to start them as they were new.  August 2019 hemoglobin A1c of 6.9.  February 2020 hemoglobin A1c is 7.1.     In February 2020, I had the patient try Jardiance at 10 mg daily and stop his glimepiride.  He reports tolerance of this program.  He reports that the cost is acceptable.  He reports that his home blood sugars generally range between 140-160.  Currently he is on metformin 1000 mg twice daily and Jardiance 10 mg daily.  He is also on Lipitor and daily aspirin.  July 2020 hemoglobin A1c is 6.8, LDL 68, TSH 0.85.     Interval history: December 2020 urine for protein was negative, creatinine was 0.82, hemoglobin A1c of 6.9, LDL was 80.  He reports tolerance of his Jardiance at 10 mg daily  and Metformin at 1000 mg twice daily.     Insurance Name:   Insurance ID:   Insurance Phone Number:     Pharmacy Information (if different than what is on RX)  Name:    Phone:

## 2021-03-16 NOTE — TELEPHONE ENCOUNTER
PA Initiation    Medication: empagliflozin (JARDIANCE) 10 MG TABS -   Insurance Company: [employer] - Phone [phone] Fax [phone]  Pharmacy Filling the Rx: [id number] [place] [facility] - [place], [place] - [address]  Filling Pharmacy Phone: [phone]  Filling Pharmacy Fax: [phone]  Start Date: 3/16/2021

## 2021-03-18 ENCOUNTER — TELEPHONE (OUTPATIENT)
Dept: ENDOCRINOLOGY | Facility: CLINIC | Age: 61
End: 2021-03-18

## 2021-03-18 NOTE — TELEPHONE ENCOUNTER
[facility] Call Center    Phone Message    May a detailed message be left on voicemail: yes     Reason for Call: Other: Pt's wife called again to f/u on message sent on 3/15 regarding this Jardiance medication and getting another coupon, pt is getting low on the medication     Action Taken: Message routed to:  [facility] endo

## 2021-03-18 NOTE — TELEPHONE ENCOUNTER
ARNULFO RICO [phone] [employer] message sent regarding Jardiance.  Ethel Kwong RN on 3/18/2021 at 10:17 AM

## 2021-03-18 NOTE — TELEPHONE ENCOUNTER
1) LVM for Pt/spouse regarding Jardiance  2) [employer] message sent with Jardiance Patient Assistance information  3) Jardiance cost coupon card mailed to Pt via [employer].  Ethel Kwong RN on 3/18/2021 at 10:20 AM

## 2021-03-23 ENCOUNTER — TELEPHONE (OUTPATIENT)
Dept: ENDOCRINOLOGY | Facility: CLINIC | Age: 61
End: 2021-03-23

## 2021-03-23 DIAGNOSIS — E04.9 GOITER: Primary | ICD-10-CM

## 2021-03-25 DIAGNOSIS — Z11.59 ENCOUNTER FOR SCREENING FOR OTHER VIRAL DISEASES: Primary | ICD-10-CM

## 2021-04-06 DIAGNOSIS — Z11.59 ENCOUNTER FOR SCREENING FOR OTHER VIRAL DISEASES: ICD-10-CM

## 2021-04-06 LAB
SARS-COV-2 RNA RESP QL NAA+PROBE: NORMAL
SPECIMEN SOURCE: NORMAL

## 2021-04-06 PROCEDURE — U0003 INFECTIOUS AGENT DETECTION BY NUCLEIC ACID (DNA OR RNA); SEVERE ACUTE RESPIRATORY SYNDROME CORONAVIRUS 2 (SARS-COV-2) (CORONAVIRUS DISEASE [COVID-19]), AMPLIFIED PROBE TECHNIQUE, MAKING USE OF HIGH THROUGHPUT TECHNOLOGIES AS DESCRIBED BY CMS-2020-01-R: HCPCS | Mod: 90 | Performed by: PATHOLOGY

## 2021-04-06 PROCEDURE — U0005 INFEC AGEN DETEC AMPLI PROBE: HCPCS | Mod: 90 | Performed by: PATHOLOGY

## 2021-04-07 LAB
LABORATORY COMMENT REPORT: NORMAL
SARS-COV-2 RNA RESP QL NAA+PROBE: NEGATIVE
SPECIMEN SOURCE: NORMAL

## 2021-04-09 ENCOUNTER — ANCILLARY PROCEDURE (OUTPATIENT)
Dept: ULTRASOUND IMAGING | Facility: CLINIC | Age: 61
End: 2021-04-09
Attending: INTERNAL MEDICINE
Payer: COMMERCIAL

## 2021-04-09 ENCOUNTER — TELEPHONE (OUTPATIENT)
Dept: ENDOCRINOLOGY | Facility: CLINIC | Age: 61
End: 2021-04-09

## 2021-04-09 DIAGNOSIS — E04.9 GOITER: ICD-10-CM

## 2021-04-09 PROCEDURE — 76536 US EXAM OF HEAD AND NECK: CPT | Performed by: RADIOLOGY

## 2021-04-10 NOTE — TELEPHONE ENCOUNTER
US reviewed - thyroid nodules unchanged compared with previous exam.    -  Dear Chema    Here is your US thyroid which show small nodules This is unchanged compared with the 2020 exam. No FNA is recommended.     If you have any questions, please feel free to contact my nurse at [phone] select option #3 for triage nurse  or  option #1 for scheduling related questions.    Regards    Cheri Watson MD    -  US THYROID 4/9/2021 9:04 AM     COMPARISON: Ultrasound 8/6/2020     HISTORY: Goiter     FINDINGS:   Thyroid parenchyma: Homogeneous  The right lobe of the thyroid measures:   2.5 x 2 x 5.4 cm   The left lobe of the thyroid measures: 2.2 x 1.9 x 5.4 cm  The thyroid isthmus measures: 3 mm     Right Lobe:  Nodule 1:  Location: Right mid lobe  Size: 9 x 6 x 9 mm  Composition: Solid or almost completely solid (2 points)  Echogenicity: Hyperechoic or isoechoic (1 point)  Shape: Wider than tall (0 points)  Margin: Smooth (0 points)  Echogenic Foci: None or large comet tail artifact (0 points)  Stability: No significant change in size  TIRADS: TR3 (3 points)      Nodule 2:  Location: Mid right lobe  Size: 9 x 5 x 11 mm  Composition: Solid or almost completely solid (2 points)  Echogenicity: Hyperechoic or isoechoic (1 point)  Shape: Wider than tall (0 points)  Margin: Smooth (0 points)  Echogenic Foci: None or large comet tail artifact (0 points)  Stability: No significant change in size  TIRADS: TR3 (3 points)      Nodule 3:  Location: Inferior right lobe  Size: 8 x 7 x 11 mm  Composition: Solid or almost completely solid (2 points)  Echogenicity: Hypoechoic (2 points)  Shape: Wider than tall (0 points)  Margin: Smooth (0 points)  Echogenic Foci: None or large comet tail artifact (0 points)  Stability: No significant change in size  TIRADS: TR4 (4-6 points)      Left Lobe:     Nodule 1:  Location: Mid left lobe  Size: 8 x 5 x 8 mm  Composition: Solid or almost completely solid (2 points)  Echogenicity: Hyperechoic or  isoechoic (1 point)  Shape: Wider than tall (0 points)  Margin: Smooth (0 points)  Echogenic Foci: None or large comet tail artifact (0 points)  Stability: No significant change in size  TIRADS: TR3 (3 points)         Nodule 2:  Location: Mid left lobe  Size: 8 x 6 x 10 mm  Composition: Solid or almost completely solid (2 points)  Echogenicity: Hypoechoic (2 points)  Shape: Wider than tall (0 points)  Margin: Smooth (0 points)  Echogenic Foci: None or large comet tail artifact (0 points)  Stability: No significant change in size  TIRADS: TR4 (4-6 points)      Nodule 3:  Location: Inferior left lobe  Size: 8 x 7 x 8 mm  Composition: Solid or almost completely solid (2 points)  Echogenicity: Hyperechoic or isoechoic (1 point)  Shape: Wider than tall (0 points)  Margin: Smooth (0 points)  Echogenic Foci: None or large comet tail artifact (0 points)  Stability: No significant change in size  TIRADS: TR3 (3 points)                                                                       Impression: No significant change in small bilateral thyroid nodules,  compared to prior ultrasound 8/6/2020. No FNA is currently recommended  as per ACR TIRADS guidelines.     ACR TI-RADS recommendations  TR2 (2 points) & TR1 (0 points) -No FNA or follow-up  TR3 (3 points) - FNA if ? 2.5cm, follow-up if 1.5 -2.4 cm in 1, 3 and  5 years  TR4 (4-6 points) - FNA if ? 1.5cm, follow-up if 1 -1.4 cm in 1, 2, 3  and 5 years  TR5 (?7 points) - FNA if ? 1cm, follow-up if 0.5 -0.9 cm every year  for 5 years      I have personally reviewed the examination and initial interpretation  and I agree with the findings.     YESSI WOO MD

## 2021-07-24 ENCOUNTER — HEALTH MAINTENANCE LETTER (OUTPATIENT)
Age: 61
End: 2021-07-24

## 2021-08-05 NOTE — PROGRESS NOTES
Outcome for 08/05/21 12:46 PM :Patient stated they are not currently checking their glucose     Chema Mccullough  is being evaluated via a billable video visit.      How would you like to obtain your AVS? Rubi  For the video visit, send the invitation by: Rubi  Will anyone else be joining your video visit? No      Outcome for 02/26/21 9:49 AM :Patient stated they are not currently checking their glucose     Chema is a 60 year old who is being evaluated via a billable video visit.      How would you like to obtain your AVS? Rubi     Will anyone else be joining your video visit? No    [facility]  Endocrinology  Cheri Watson MD  8/6/21    This is a virtual visit conducted by [facility].  Start time 1106, stop time 1126, documentation time, coordination of care 10 minutes-total time spent day of encounter 30 minutes     Chief Complaint:   Diabetes    History of Present Illness:   Chema Mccullough is a 61 year old male with a history of type 2 diabetes and goiter who presents for follow up of diabetes.    #1 Diabetes mellitus type 2  The patient was diagnosed with prediabetes in 2004 and then in 2009 found to have fasting blood sugar of 184 with A1c of 7.5%.  He was started on Metformin XR in February 2009, dose increased to 500 mg twice daily in March 2013.  A1c from 2013 - 2016 was consistently in the 8% range.  November 2015 evaluation significant for detectable C-peptide of 3.2.  June 2016 his Metformin was increased to 2000 mg daily.  December 2017 he was started on Amaryl 1 mg daily as he was experiencing postprandial hyperglycemia. A1c between April 2018-October 2018 consistently in 6% range. August 2018 PSA was 0.8, creatinine was 0.75, hemoglobin A1c of 6.6, urine for microalbumin was negative, and LDL was 78.  August 2019 hemoglobin A1c was 6.9.  During his previous visit on 8/16/19 SGLT-2 inhibitors and GLP-1 agonists were discussed, but the patient was hesitant to start them as they were new.   August 2019 hemoglobin A1c of 6.9.  February 2020 hemoglobin A1c is 7.1.    In February 2020, I had the patient try Jardiance at 10 mg daily and stop his glimepiride.   July 2020 hemoglobin A1c is 6.8, LDL 68, TSH 0.85.December 2020 urine for protein was negative, creatinine was 0.82, hemoglobin A1c of 6.9, LDL was 80.      Interval history: The patient remains on Jardiance at 10 mg daily and Metformin at 1000 mg twice daily.  He has continued to lose weight and currently weighs about 171 pounds.  Overall, he feels quite well.      Diabetes monitoring and complications:  CAD: No  Last eye exam results: 2/28/19 - No diabetic retinopathy  Microalbuminuria: Negative in December 2020  Neuropathy: No  HTN: No  On Statin: Yes  On Aspirin: Yes  Depression: No  Erectile dysfunction: No    #2 Hyperlipidemia  Stress test in 2016 was unremarkable. December 2017 LDL was 44. At that time he was taking Lipitor 40 mg daily. August 2019 LDL was 78.     Interval History  He continues on Lipitor daily. December 2020 LDL is 80.    #3 Goiter  June 2016, he was noted to have a multinodular goiter with several nodules roughly 1 cm in size. October 2016 formal neck ultrasound showed multinodular goiter, with  largest nodules on the right side at 1.1 cm in size (two nodules) and the largest nodule on the left side at 1.2 cm in size.  The patient had declined ultrasound-guided FNA in October 2016. Eval with floor ultrasound in April 2017 showed the following: Right anterior nodule 1.0 x 0.6 x 0.6 cm in size, right posterior/inferior nodule at 1.0 x 0.5 x 1.0 cm in size, left inferior nodule at 0.6 x 0.5 x 0.7 cm in size. April 2018 US showed no interval change in thyroid nodules. Neck ultrasound performed in August 2020 continues to show small nodules bilaterally which are unchanged in size.    Interval History  Repeat neck ultrasound in April 2021 showed multiple nodules bilaterally, the largest about 1.1 cm in size-none met criteria for  "biopsy with no change compared with the previous exam in 2020.      Review of Systems:   Pertinent items are noted in HPI.  All other systems are negative.    Active Medications:     Current Outpatient Medications:      aspirin (ASA) 81 MG EC tablet, Take 1 tablet (81 mg) by mouth daily, Disp: 90 tablet, Rfl: 3     atorvastatin (LIPITOR) 40 MG tablet, Take 1 tablet (40 mg) by mouth daily Take one daily, Disp: 90 tablet, Rfl: 3     empagliflozin (JARDIANCE) 10 MG TABS tablet, Take 1 tablet (10 mg) by mouth daily, Disp: 90 tablet, Rfl: 4     MAGNESIUM PO, Pt. Unsure of dosage, Disp: , Rfl:      metFORMIN (GLUCOPHAGE) 1000 MG tablet, Take 1 tablet (1,000 mg) by mouth 2 times daily (with meals), Disp: 180 tablet, Rfl: 3     Omega-3 Fatty Acids (OMEGA 3 PO), Take by mouth daily, Disp: , Rfl:     Current Facility-Administered Medications:      lidocaine 1 % injection 2 mL, 2 mL, INTRA-ARTICULAR, Once, Camila Baeza MD      Allergies:   Patient has no known allergies.      Past Medical History:  Type 2 diabetes mellitus  Goiter  Coronary arteriosclerosis  Hyperlipidemia  Obesity  Colon adenoma     Past Surgical History:  No past surgical history on file.    Family History:   Diabetes: Mother, father      Social History:   Social History     Tobacco Use     Smoking status: Former Smoker     Packs/day: 1.00     Types: Cigarettes, Cigars     Start date: 10/10/1978     Quit date: 1/1/2011     Years since quitting: 10.6     Smokeless tobacco: Former User   Substance Use Topics     Alcohol use: No     Drug use: No        Physical Exam:   GENERAL: Healthy, alert and no distress\",\"EYES: Eyes grossly normal to inspection.  No discharge or erythema, or obvious scleral/conjunctival abnormalities.\",\"RESP: No audible wheeze, cough, or visible cyanosis.  No visible retractions or increased work of breathing.  \",\"SKIN: Visible skin clear. No significant rash, abnormal pigmentation or lesions.\",\"NEURO: Cranial nerves grossly " "intact.  Mentation and speech appropriate for age.\",\"PSYCH: Mentation appears normal, affect normal/bright, judgement and insight intact, normal speech and appearance well-groomed.       Data:  US THYROID 4/9/2021 9:04 AM     COMPARISON: Ultrasound 8/6/2020     HISTORY: Goiter     FINDINGS:   Thyroid parenchyma: Homogeneous  The right lobe of the thyroid measures:   2.5 x 2 x 5.4 cm   The left lobe of the thyroid measures: 2.2 x 1.9 x 5.4 cm  The thyroid isthmus measures: 3 mm     Right Lobe:  Nodule 1:  Location: Right mid lobe  Size: 9 x 6 x 9 mm  Composition: Solid or almost completely solid (2 points)  Echogenicity: Hyperechoic or isoechoic (1 point)  Shape: Wider than tall (0 points)  Margin: Smooth (0 points)  Echogenic Foci: None or large comet tail artifact (0 points)  Stability: No significant change in size  TIRADS: TR3 (3 points)      Nodule 2:  Location: Mid right lobe  Size: 9 x 5 x 11 mm  Composition: Solid or almost completely solid (2 points)  Echogenicity: Hyperechoic or isoechoic (1 point)  Shape: Wider than tall (0 points)  Margin: Smooth (0 points)  Echogenic Foci: None or large comet tail artifact (0 points)  Stability: No significant change in size  TIRADS: TR3 (3 points)      Nodule 3:  Location: Inferior right lobe  Size: 8 x 7 x 11 mm  Composition: Solid or almost completely solid (2 points)  Echogenicity: Hypoechoic (2 points)  Shape: Wider than tall (0 points)  Margin: Smooth (0 points)  Echogenic Foci: None or large comet tail artifact (0 points)  Stability: No significant change in size  TIRADS: TR4 (4-6 points)      Left Lobe:     Nodule 1:  Location: Mid left lobe  Size: 8 x 5 x 8 mm  Composition: Solid or almost completely solid (2 points)  Echogenicity: Hyperechoic or isoechoic (1 point)  Shape: Wider than tall (0 points)  Margin: Smooth (0 points)  Echogenic Foci: None or large comet tail artifact (0 points)  Stability: No significant change in size  TIRADS: TR3 (3 points) "         Nodule 2:  Location: Mid left lobe  Size: 8 x 6 x 10 mm  Composition: Solid or almost completely solid (2 points)  Echogenicity: Hypoechoic (2 points)  Shape: Wider than tall (0 points)  Margin: Smooth (0 points)  Echogenic Foci: None or large comet tail artifact (0 points)  Stability: No significant change in size  TIRADS: TR4 (4-6 points)      Nodule 3:  Location: Inferior left lobe  Size: 8 x 7 x 8 mm  Composition: Solid or almost completely solid (2 points)  Echogenicity: Hyperechoic or isoechoic (1 point)  Shape: Wider than tall (0 points)  Margin: Smooth (0 points)  Echogenic Foci: None or large comet tail artifact (0 points)  Stability: No significant change in size  TIRADS: TR3 (3 points)                                                                       Impression: No significant change in small bilateral thyroid nodules,  compared to prior ultrasound 8/6/2020. No FNA is currently recommended  as per ACR TIRADS guidelines.     ACR TI-RADS recommendations  TR2 (2 points) & TR1 (0 points) -No FNA or follow-up  TR3 (3 points) - FNA if ? 2.5cm, follow-up if 1.5 -2.4 cm in 1, 3 and  5 years  TR4 (4-6 points) - FNA if ? 1.5cm, follow-up if 1 -1.4 cm in 1, 2, 3  and 5 years  TR5 (?7 points) - FNA if ? 1cm, follow-up if 0.5 -0.9 cm every year  for 5 years      I have personally reviewed the examination and initial interpretation  and I agree with the findings.     YESSI WOO MD      Assessment and Plan:  #1 Diabetes mellitus type 2  Patient to continue on Jardiance at 10 mg daily.  Patient to continue with Metformin at 1000 mg twice daily.  Recommended that patient repeat labs as his last labs were done in December 2020.  Continue with dietary lifestyle changes.  Will recommend that patient repeat labs to check TSH, free T4, basic metabolic panel, and hemoglobin A1c.  Overall, it seems like patient is doing extremely well-congratulated patient.    #2 Hyperlipidemia  He is to continue on Lipitor.   December 2020 LDL satisfactory.      #3 Goiter  April 2021 neck ultrasound was unremarkable.  Repeat neck ultrasound could be considered in April 2023.    Follow-up: Return visit planned in 6 months.  We will have patient check the labs as ordered below prior to the visit    This is a virtual visit conducted by [facility].  Start time 1106, stop time 1126, documentation time, coordination of care 10 minutes-total time spent day of encounter 30 minutes

## 2021-08-06 ENCOUNTER — VIRTUAL VISIT (OUTPATIENT)
Dept: ENDOCRINOLOGY | Facility: CLINIC | Age: 61
End: 2021-08-06
Payer: COMMERCIAL

## 2021-08-06 DIAGNOSIS — E11.9 TYPE 2 DIABETES MELLITUS WITHOUT COMPLICATION, WITHOUT LONG-TERM CURRENT USE OF INSULIN (H): ICD-10-CM

## 2021-08-06 PROCEDURE — 99214 OFFICE O/P EST MOD 30 MIN: CPT | Mod: 95 | Performed by: INTERNAL MEDICINE

## 2021-08-06 RX ORDER — ATORVASTATIN CALCIUM 40 MG/1
40 TABLET, FILM COATED ORAL DAILY
Qty: 90 TABLET | Refills: 3 | Status: SHIPPED | OUTPATIENT
Start: 2021-08-06 | End: 2022-01-28

## 2021-08-06 NOTE — LETTER
8/6/2021       RE: Chema Mccullough  [address] [place] [address]     Dear Colleague,    Thank you for referring your patient, Chema Mccullough, to the [facility] [place] at [facility]. Please see a copy of my visit note below.    Outcome for 08/05/21 12:46 PM :Patient stated they are not currently checking their glucose     Chema Mccullough  is being evaluated via a billable video visit.      How would you like to obtain your AVS? MyChart  For the video visit, send the invitation by: Mychart  Will anyone else be joining your video visit? No      Outcome for 02/26/21 9:49 AM :Patient stated they are not currently checking their glucose     Chema is a 60 year old who is being evaluated via a billable video visit.      How would you like to obtain your AVS? MyChart     Will anyone else be joining your video visit? No    [facility]  Endocrinology  Cheri Watson MD  8/6/21    This is a virtual visit conducted by Parish.  Start time 1106, stop time 1126, documentation time, coordination of care 10 minutes-total time spent day of encounter 30 minutes     Chief Complaint:   Diabetes    History of Present Illness:   Chema Mccullough is a 61 year old male with a history of type 2 diabetes and goiter who presents for follow up of diabetes.    #1 Diabetes mellitus type 2  The patient was diagnosed with prediabetes in 2004 and then in 2009 found to have fasting blood sugar of 184 with A1c of 7.5%.  He was started on Metformin XR in February 2009, dose increased to 500 mg twice daily in March 2013.  A1c from 2013 - 2016 was consistently in the 8% range.  November 2015 evaluation significant for detectable C-peptide of 3.2.  June 2016 his Metformin was increased to 2000 mg daily.  December 2017 he was started on Amaryl 1 mg daily as he was experiencing postprandial hyperglycemia. A1c between April 2018-October 2018  consistently in 6% range. August 2018 PSA was 0.8, creatinine was 0.75, hemoglobin A1c of 6.6, urine for microalbumin was negative, and LDL was 78.  August 2019 hemoglobin A1c was 6.9.  During his previous visit on 8/16/19 SGLT-2 inhibitors and GLP-1 agonists were discussed, but the patient was hesitant to start them as they were new.  August 2019 hemoglobin A1c of 6.9.  February 2020 hemoglobin A1c is 7.1.    In February 2020, I had the patient try Jardiance at 10 mg daily and stop his glimepiride.   July 2020 hemoglobin A1c is 6.8, LDL 68, TSH 0.85.December 2020 urine for protein was negative, creatinine was 0.82, hemoglobin A1c of 6.9, LDL was 80.      Interval history: The patient remains on Jardiance at 10 mg daily and Metformin at 1000 mg twice daily.  He has continued to lose weight and currently weighs about 171 pounds.  Overall, he feels quite well.      Diabetes monitoring and complications:  CAD: No  Last eye exam results: 2/28/19 - No diabetic retinopathy  Microalbuminuria: Negative in December 2020  Neuropathy: No  HTN: No  On Statin: Yes  On Aspirin: Yes  Depression: No  Erectile dysfunction: No    #2 Hyperlipidemia  Stress test in 2016 was unremarkable. December 2017 LDL was 44. At that time he was taking Lipitor 40 mg daily. August 2019 LDL was 78.     Interval History  He continues on Lipitor daily. December 2020 LDL is 80.    #3 Goiter  June 2016, he was noted to have a multinodular goiter with several nodules roughly 1 cm in size. October 2016 formal neck ultrasound showed multinodular goiter, with  largest nodules on the right side at 1.1 cm in size (two nodules) and the largest nodule on the left side at 1.2 cm in size.  The patient had declined ultrasound-guided FNA in October 2016. Eval with floor ultrasound in April 2017 showed the following: Right anterior nodule 1.0 x 0.6 x 0.6 cm in size, right posterior/inferior nodule at 1.0 x 0.5 x 1.0 cm in size, left inferior nodule at 0.6 x 0.5 x 0.7  "cm in size. April 2018 US showed no interval change in thyroid nodules. Neck ultrasound performed in August 2020 continues to show small nodules bilaterally which are unchanged in size.    Interval History  Repeat neck ultrasound in April 2021 showed multiple nodules bilaterally, the largest about 1.1 cm in size-none met criteria for biopsy with no change compared with the previous exam in 2020.      Review of Systems:   Pertinent items are noted in HPI.  All other systems are negative.    Active Medications:     Current Outpatient Medications:      aspirin (ASA) 81 MG EC tablet, Take 1 tablet (81 mg) by mouth daily, Disp: 90 tablet, Rfl: 3     atorvastatin (LIPITOR) 40 MG tablet, Take 1 tablet (40 mg) by mouth daily Take one daily, Disp: 90 tablet, Rfl: 3     empagliflozin (JARDIANCE) 10 MG TABS tablet, Take 1 tablet (10 mg) by mouth daily, Disp: 90 tablet, Rfl: 4     MAGNESIUM PO, Pt. Unsure of dosage, Disp: , Rfl:      metFORMIN (GLUCOPHAGE) 1000 MG tablet, Take 1 tablet (1,000 mg) by mouth 2 times daily (with meals), Disp: 180 tablet, Rfl: 3     Omega-3 Fatty Acids (OMEGA 3 PO), Take by mouth daily, Disp: , Rfl:     Current Facility-Administered Medications:      lidocaine 1 % injection 2 mL, 2 mL, INTRA-ARTICULAR, Once, Camila Baeza MD      Allergies:   Patient has no known allergies.      Past Medical History:  Type 2 diabetes mellitus  Goiter  Coronary arteriosclerosis  Hyperlipidemia  Obesity  Colon adenoma     Past Surgical History:  No past surgical history on file.    Family History:   Diabetes: Mother, father      Social History:   Social History     Tobacco Use     Smoking status: Former Smoker     Packs/day: 1.00     Types: Cigarettes, Cigars     Start date: 10/10/1978     Quit date: 1/1/2011     Years since quitting: 10.6     Smokeless tobacco: Former User   Substance Use Topics     Alcohol use: No     Drug use: No        Physical Exam:   GENERAL: Healthy, alert and no distress\",\"EYES: Eyes " "grossly normal to inspection.  No discharge or erythema, or obvious scleral/conjunctival abnormalities.\",\"RESP: No audible wheeze, cough, or visible cyanosis.  No visible retractions or increased work of breathing.  \",\"SKIN: Visible skin clear. No significant rash, abnormal pigmentation or lesions.\",\"NEURO: Cranial nerves grossly intact.  Mentation and speech appropriate for age.\",\"PSYCH: Mentation appears normal, affect normal/bright, judgement and insight intact, normal speech and appearance well-groomed.       Data:  US THYROID 4/9/2021 9:04 AM     COMPARISON: Ultrasound 8/6/2020     HISTORY: Goiter     FINDINGS:   Thyroid parenchyma: Homogeneous  The right lobe of the thyroid measures:   2.5 x 2 x 5.4 cm   The left lobe of the thyroid measures: 2.2 x 1.9 x 5.4 cm  The thyroid isthmus measures: 3 mm     Right Lobe:  Nodule 1:  Location: Right mid lobe  Size: 9 x 6 x 9 mm  Composition: Solid or almost completely solid (2 points)  Echogenicity: Hyperechoic or isoechoic (1 point)  Shape: Wider than tall (0 points)  Margin: Smooth (0 points)  Echogenic Foci: None or large comet tail artifact (0 points)  Stability: No significant change in size  TIRADS: TR3 (3 points)      Nodule 2:  Location: Mid right lobe  Size: 9 x 5 x 11 mm  Composition: Solid or almost completely solid (2 points)  Echogenicity: Hyperechoic or isoechoic (1 point)  Shape: Wider than tall (0 points)  Margin: Smooth (0 points)  Echogenic Foci: None or large comet tail artifact (0 points)  Stability: No significant change in size  TIRADS: TR3 (3 points)      Nodule 3:  Location: Inferior right lobe  Size: 8 x 7 x 11 mm  Composition: Solid or almost completely solid (2 points)  Echogenicity: Hypoechoic (2 points)  Shape: Wider than tall (0 points)  Margin: Smooth (0 points)  Echogenic Foci: None or large comet tail artifact (0 points)  Stability: No significant change in size  TIRADS: TR4 (4-6 points)      Left Lobe:     Nodule 1:  Location: Mid left " lobe  Size: 8 x 5 x 8 mm  Composition: Solid or almost completely solid (2 points)  Echogenicity: Hyperechoic or isoechoic (1 point)  Shape: Wider than tall (0 points)  Margin: Smooth (0 points)  Echogenic Foci: None or large comet tail artifact (0 points)  Stability: No significant change in size  TIRADS: TR3 (3 points)         Nodule 2:  Location: Mid left lobe  Size: 8 x 6 x 10 mm  Composition: Solid or almost completely solid (2 points)  Echogenicity: Hypoechoic (2 points)  Shape: Wider than tall (0 points)  Margin: Smooth (0 points)  Echogenic Foci: None or large comet tail artifact (0 points)  Stability: No significant change in size  TIRADS: TR4 (4-6 points)      Nodule 3:  Location: Inferior left lobe  Size: 8 x 7 x 8 mm  Composition: Solid or almost completely solid (2 points)  Echogenicity: Hyperechoic or isoechoic (1 point)  Shape: Wider than tall (0 points)  Margin: Smooth (0 points)  Echogenic Foci: None or large comet tail artifact (0 points)  Stability: No significant change in size  TIRADS: TR3 (3 points)                                                                       Impression: No significant change in small bilateral thyroid nodules,  compared to prior ultrasound 8/6/2020. No FNA is currently recommended  as per ACR TIRADS guidelines.     ACR TI-RADS recommendations  TR2 (2 points) & TR1 (0 points) -No FNA or follow-up  TR3 (3 points) - FNA if ? 2.5cm, follow-up if 1.5 -2.4 cm in 1, 3 and  5 years  TR4 (4-6 points) - FNA if ? 1.5cm, follow-up if 1 -1.4 cm in 1, 2, 3  and 5 years  TR5 (?7 points) - FNA if ? 1cm, follow-up if 0.5 -0.9 cm every year  for 5 years      I have personally reviewed the examination and initial interpretation  and I agree with the findings.     YESSI WOO MD      Assessment and Plan:  #1 Diabetes mellitus type 2  Patient to continue on Jardiance at 10 mg daily.  Patient to continue with Metformin at 1000 mg twice daily.  Recommended that patient repeat labs as  his last labs were done in December 2020.  Continue with dietary lifestyle changes.  Will recommend that patient repeat labs to check TSH, free T4, basic metabolic panel, and hemoglobin A1c.  Overall, it seems like patient is doing extremely well-congratulated patient.    #2 Hyperlipidemia  He is to continue on Lipitor.  December 2020 LDL satisfactory.      #3 Goiter  April 2021 neck ultrasound was unremarkable.  Repeat neck ultrasound could be considered in April 2023.    Follow-up: Return visit planned in 6 months.  We will have patient check the labs as ordered below prior to the visit    This is a virtual visit conducted by Parish.  Start time 1106, stop time 1126, documentation time, coordination of care 10 minutes-total time spent day of encounter 30 minutes     Again, thank you for allowing me to participate in the care of your patient.      Sincerely,    Cheri Watson MD

## 2021-08-06 NOTE — PATIENT INSTRUCTIONS
coolibar clothing    yamini air compressor for the inflatable (if you want)    Do labs at [facility]        -    Exercise more    ---

## 2021-08-09 ENCOUNTER — LAB (OUTPATIENT)
Dept: LAB | Facility: CLINIC | Age: 61
End: 2021-08-09
Payer: COMMERCIAL

## 2021-08-09 DIAGNOSIS — E11.9 TYPE 2 DIABETES MELLITUS WITHOUT COMPLICATION, WITHOUT LONG-TERM CURRENT USE OF INSULIN (H): ICD-10-CM

## 2021-08-09 DIAGNOSIS — Z71.84 TRAVEL ADVICE ENCOUNTER: ICD-10-CM

## 2021-08-09 LAB
ANION GAP SERPL CALCULATED.3IONS-SCNC: 5 MMOL/L (ref 3–14)
BUN SERPL-MCNC: 21 MG/DL (ref 7–30)
CALCIUM SERPL-MCNC: 9.5 MG/DL (ref 8.5–10.1)
CHLORIDE BLD-SCNC: 106 MMOL/L (ref 94–109)
CHOLEST SERPL-MCNC: 154 MG/DL
CO2 SERPL-SCNC: 29 MMOL/L (ref 20–32)
CREAT SERPL-MCNC: 0.84 MG/DL (ref 0.66–1.25)
FASTING STATUS PATIENT QL REPORTED: YES
GFR SERPL CREATININE-BSD FRML MDRD: >90 ML/MIN/1.73M2
GLUCOSE BLD-MCNC: 129 MG/DL (ref 70–99)
HBA1C MFR BLD: 6.7 % (ref 0–5.6)
HDLC SERPL-MCNC: 35 MG/DL
LDLC SERPL CALC-MCNC: 73 MG/DL
NONHDLC SERPL-MCNC: 119 MG/DL
POTASSIUM BLD-SCNC: 4.7 MMOL/L (ref 3.4–5.3)
SODIUM SERPL-SCNC: 140 MMOL/L (ref 133–144)
T3 SERPL-MCNC: 99 NG/DL (ref 60–181)
T4 FREE SERPL-MCNC: 0.95 NG/DL (ref 0.76–1.46)
TRIGL SERPL-MCNC: 228 MG/DL
TSH SERPL DL<=0.005 MIU/L-ACNC: 0.73 MU/L (ref 0.4–4)

## 2021-08-09 PROCEDURE — 84439 ASSAY OF FREE THYROXINE: CPT

## 2021-08-09 PROCEDURE — 84480 ASSAY TRIIODOTHYRONINE (T3): CPT

## 2021-08-09 PROCEDURE — 36415 COLL VENOUS BLD VENIPUNCTURE: CPT

## 2021-08-09 PROCEDURE — 80048 BASIC METABOLIC PNL TOTAL CA: CPT

## 2021-08-09 PROCEDURE — 83036 HEMOGLOBIN GLYCOSYLATED A1C: CPT

## 2021-08-09 PROCEDURE — 84443 ASSAY THYROID STIM HORMONE: CPT

## 2021-08-09 PROCEDURE — 80061 LIPID PANEL: CPT

## 2021-08-09 NOTE — LETTER
Patient:  Chema Mccullough  :   1960  MRN:     [id number]        Mr.Georgios ANITA Mccullough  [address] [place] [address]        2022    Dear hCema      Here are your labs which show a slightly higher hemoglobin A1c at 7.1.  The rest of your labs actually look pretty good.  Potentially, we can increase your Jardiance to 25 mg daily.  However, if you think there is room to change in your diet, that would also be reasonable and we can recheck labs in 6 months.    If you have any questions, please feel free to contact my nurse at [phone] select option #3 for triage nurse  or  option #1 for scheduling related questions.    Regards    Cheri Watson MD     Resulted Orders   T3 total   Result Value Ref Range    T3 Total 99 60 - 181 ng/dL   T4 free   Result Value Ref Range    Free T4 0.95 0.76 - 1.46 ng/dL   TSH   Result Value Ref Range    TSH 0.73 0.40 - 4.00 mU/L   Basic metabolic panel   Result Value Ref Range    Sodium 140 133 - 144 mmol/L    Potassium 4.7 3.4 - 5.3 mmol/L    Chloride 106 94 - 109 mmol/L    Carbon Dioxide (CO2) 29 20 - 32 mmol/L    Anion Gap 5 3 - 14 mmol/L    Urea Nitrogen 21 7 - 30 mg/dL    Creatinine 0.84 0.66 - 1.25 mg/dL    Calcium 9.5 8.5 - 10.1 mg/dL    Glucose 129 (H) 70 - 99 mg/dL    GFR Estimate >90 >60 mL/min/1.73m2      Comment:      As of 2021, eGFR is calculated by the CKD-EPI creatinine equation, without race adjustment. eGFR can be influenced by muscle mass, exercise, and diet. The reported eGFR is an estimation only and is only applicable if the renal function is stable.   Hemoglobin A1c   Result Value Ref Range    Hemoglobin A1C 6.7 (H) 0.0 - 5.6 %      Comment:      Normal <5.7%   Prediabetes 5.7-6.4%    Diabetes 6.5% or higher     Note: Adopted from ADA consensus guidelines.   Hemoglobin A1c   Result Value Ref Range    Hemoglobin A1C 7.1 (H) 0.0 - 5.6 %      Comment:      Normal <5.7%   Prediabetes 5.7-6.4%    Diabetes 6.5% or higher      Note: Adopted from ADA consensus guidelines.   Lipid Profile   Result Value Ref Range    Cholesterol 154 <200 mg/dL      Comment:      Age 0-19 years  Desirable: <170 mg/dL  Borderline high:  170-199 mg/dl  High:            >199 mg/dl    Age 20 years and older  Desirable: <200 mg/dL    Triglycerides 228 (H) <150 mg/dL      Comment:      0-9 years:  Normal:    Less than 75 mg/dL  Borderline high:  75-99 mg/dL  High:             Greater than or equal to 100 mg/dL    0-19 years:  Normal:    Less than 90 mg/dL  Borderline high:   mg/dL  High:             Greater than or equal to 130 mg/dL    20 years and older:  Normal:    Less than 150 mg/dL  Borderline high:  150-199 mg/dL  High:             200-499 mg/dL  Very high:   Greater than or equal to 500 mg/dL    Direct Measure HDL 35 (L) >=40 mg/dL      Comment:      0-19 years:       Greater than or equal to 45 mg/dL   Low: Less than 40 mg/dL   Borderline low: 40-44 mg/dL     20 years and older:   Female: Greater than or equal to 50 mg/dL   Male:   Greater than or equal to 40 mg/dL         LDL Cholesterol Calculated 73 <=100 mg/dL      Comment:      Age 0-19 years:  Desirable: 0-110 mg/dL   Borderline high: 110-129 mg/dL   High: >= 130 mg/dL    Age 20 years and older:  Desirable: <100mg/dL  Above desirable: 100-129 mg/dL   Borderline high: 130-159 mg/dL   High: 160-189 mg/dL   Very high: >= 190 mg/dL    Non HDL Cholesterol 119 <130 mg/dL      Comment:      0-19 years:  Desirable:          Less than 120 mg/dL  Borderline high:   120-144 mg/dL  High:                   Greater than or equal to 145 mg/dL    20 years and older:  Desirable:          130 mg/dL  Above Desirable: 130-159 mg/dL  Borderline high:   160-189 mg/dL  High:               190-219 mg/dL  Very high:     Greater than or equal to 220 mg/dL    Patient Fasting > 8hrs? Yes        First Floor Lab

## 2021-09-18 ENCOUNTER — HEALTH MAINTENANCE LETTER (OUTPATIENT)
Age: 61
End: 2021-09-18

## 2021-11-09 DIAGNOSIS — E11.9 TYPE 2 DIABETES MELLITUS WITHOUT COMPLICATION, WITHOUT LONG-TERM CURRENT USE OF INSULIN (H): ICD-10-CM

## 2021-11-10 NOTE — TELEPHONE ENCOUNTER
aspirin (ASA) 81 MG EC tablet  90 Tabs, 3 Refills sent to pharm 11/10/2021  Last visit:  8/6/2021    Lela Quezada RN  Central Triage Red Flags/Med Refills

## 2022-01-08 ENCOUNTER — HEALTH MAINTENANCE LETTER (OUTPATIENT)
Age: 62
End: 2022-01-08

## 2022-01-26 ENCOUNTER — TELEPHONE (OUTPATIENT)
Dept: ENDOCRINOLOGY | Facility: CLINIC | Age: 62
End: 2022-01-26
Payer: COMMERCIAL

## 2022-01-26 NOTE — PROGRESS NOTES
Outcome for 01/26/22 8:58 AM: Left mail    Danyelle Field CMA    Outcome for 01/26/22 12:59 PM: Left mail    Danyelle Field CMA    Outcome for 01/28/22 12:06 PM: Patient is not checking blood sugars   Shauna Jefferson

## 2022-01-26 NOTE — TELEPHONE ENCOUNTER
Attempted to reach patient to obtain the last two weeks of glucose readings before his appointment on Friday.  Danyelle Field, CMA

## 2022-01-27 ENCOUNTER — TELEPHONE (OUTPATIENT)
Dept: ENDOCRINOLOGY | Facility: CLINIC | Age: 62
End: 2022-01-27
Payer: COMMERCIAL

## 2022-01-27 NOTE — PROGRESS NOTES
[facility]  Endocrinology  Cheri Watson MD  1/28/22    Chief Complaint:   Diabetes    History of Present Illness:   Chema Mccullough is a 61 year old male with a history of type 2 diabetes and goiter who presents for follow up of diabetes.    This is a virtual visit conducted by Parish.  Start time noon, stop time 1215, documentation time, coordination of care, 5 minutes.  Total time spent day of encounter 20 minutes.    #1 Diabetes mellitus type 2  The patient was diagnosed with prediabetes in 2004 and then in 2009 found to have fasting blood sugar of 184 with A1c of 7.5%.  He was started on Metformin XR in February 2009, dose increased to 500 mg twice daily in March 2013.  A1c from 2013 - 2016 was consistently in the 8% range.  November 2015 evaluation significant for detectable C-peptide of 3.2.  June 2016 his Metformin was increased to 2000 mg daily.  December 2017 he was started on Amaryl 1 mg daily as he was experiencing postprandial hyperglycemia. A1c between April 2018-October 2018 consistently in 6% range. August 2018 PSA was 0.8, creatinine was 0.75, hemoglobin A1c of 6.6, urine for microalbumin was negative, and LDL was 78.  August 2019 hemoglobin A1c was 6.9.  During his previous visit on 8/16/19 SGLT-2 inhibitors and GLP-1 agonists were discussed, but the patient was hesitant to start them as they were new.  August 2019 hemoglobin A1c of 6.9.  February 2020 hemoglobin A1c is 7.1.    In February 2020, I had the patient try Jardiance at 10 mg daily and stop his glimepiride.   July 2020 hemoglobin A1c is 6.8, LDL 68, TSH 0.85.December 2020 urine for protein was negative, creatinine was 0.82, hemoglobin A1c of 6.9, LDL was 80.      Interval history: The patient remains on Jardiance at 10 mg daily and Metformin at 1000 mg twice daily.  His weight has been stable over the past year and reports that he currently weighs 170 pounds.  August 2021 LDL was 73, August 2021 when hemoglobin A1c 6.7, TSH 0.73.   Interestingly, the patient reports that being on the Jardiance has improved his mentation and reduced his anxiety.  He denies any side effects from the Jardiance.    Diabetes monitoring and complications:  CAD: No  Last eye exam results: 2/28/19 - No diabetic retinopathy  Microalbuminuria: Negative in December 2020  Neuropathy: No  HTN: No  On Statin: Yes  On Aspirin: Yes  Depression: No  Erectile dysfunction: No    #2 Hyperlipidemia  Stress test in 2016 was unremarkable. December 2017 LDL was 44. At that time he was taking Lipitor 40 mg daily. August 2019 LDL was 78.     Interval History  He continues on Lipitor daily.  August 2021 LDL was 73.    #3 Goiter  June 2016, he was noted to have a multinodular goiter with several nodules roughly 1 cm in size. October 2016 formal neck ultrasound showed multinodular goiter, with  largest nodules on the right side at 1.1 cm in size (two nodules) and the largest nodule on the left side at 1.2 cm in size.  The patient had declined ultrasound-guided FNA in October 2016. Eval with floor ultrasound in April 2017 showed the following: Right anterior nodule 1.0 x 0.6 x 0.6 cm in size, right posterior/inferior nodule at 1.0 x 0.5 x 1.0 cm in size, left inferior nodule at 0.6 x 0.5 x 0.7 cm in size. April 2018 US showed no interval change in thyroid nodules. Neck ultrasound performed in August 2020 continues to show small nodules bilaterally which are unchanged in size. Repeat neck ultrasound in April 2021 showed multiple nodules bilaterally, the largest about 1.1 cm in size-none met criteria for biopsy with no change compared with the previous exam in 2020.    Interval History  No new symptoms.      Review of Systems:   Pertinent items are noted in HPI.  All other systems are negative.    Active Medications:     Current Outpatient Medications:      aspirin (ASA) 81 MG EC tablet, Take 1 tablet (81 mg) by mouth daily, Disp: 90 tablet, Rfl: 3     atorvastatin (LIPITOR) 40 MG tablet,  "Take 1 tablet (40 mg) by mouth daily Take one daily, Disp: 90 tablet, Rfl: 3     empagliflozin (JARDIANCE) 10 MG TABS tablet, Take 1 tablet (10 mg) by mouth daily, Disp: 90 tablet, Rfl: 4     MAGNESIUM PO, Pt. Unsure of dosage, Disp: , Rfl:      metFORMIN (GLUCOPHAGE) 1000 MG tablet, Take 1 tablet (1,000 mg) by mouth 2 times daily (with meals), Disp: 180 tablet, Rfl: 3     Omega-3 Fatty Acids (OMEGA 3 PO), Take by mouth daily, Disp: , Rfl:     Current Facility-Administered Medications:      lidocaine 1 % injection 2 mL, 2 mL, INTRA-ARTICULAR, Once, Camila Baeza MD      Allergies:   Patient has no known allergies.      Past Medical History:  Type 2 diabetes mellitus  Goiter  Coronary arteriosclerosis  Hyperlipidemia  Obesity  Colon adenoma     Past Surgical History:  No past surgical history on file.    Family History:   Diabetes: Mother, father      Social History:   Social History     Tobacco Use     Smoking status: Former Smoker     Packs/day: 1.00     Types: Cigarettes, Cigars     Start date: 10/10/1978     Quit date: 2011     Years since quittin.0     Smokeless tobacco: Former User   Substance Use Topics     Alcohol use: No     Drug use: No        Physical Exam:   GENERAL: Healthy, alert and no distress\",\"EYES: Eyes grossly normal to inspection.  No discharge or erythema, or obvious scleral/conjunctival abnormalities.\",\"RESP: No audible wheeze, cough, or visible cyanosis.  No visible retractions or increased work of breathing.  \",\"SKIN: Visible skin clear. No significant rash, abnormal pigmentation or lesions.\",\"NEURO: Cranial nerves grossly intact.  Mentation and speech appropriate for age.\",\"PSYCH: Mentation appears normal, affect normal/bright, judgement and insight intact, normal speech and appearance well-groomed.       Data:  2021 LDL 73, hemoglobin A1c 6.7, TSH 0.73    Assessment and Plan:  #1 Diabetes mellitus type 2  Patient to continue on Jardiance at 10 mg daily.  Patient to " continue with Metformin at 1000 mg twice daily.  We will have patient repeat labs as noted below.  He will do this within the next month.  Return visit planned in 6 months.    #2 Hyperlipidemia  He is to continue on Lipitor.  August 2021 LDL satisfactory.    #3 Goiter  Not discussed today.  August 2021 TFTs normal.  Repeat neck ultrasound could be considered in April 2023.    Follow-up: Return visit planned in 6 months.  We will have patient check the labs as ordered below prior to the visit    Orders Placed This Encounter   Procedures     TSH     T4 free     Albumin Random Urine Quantitative with Creat Ratio     Hemoglobin A1c     Basic metabolic panel     Vitamin B12     25 Hydroxyvitamin D2 and D3

## 2022-01-28 ENCOUNTER — VIRTUAL VISIT (OUTPATIENT)
Dept: ENDOCRINOLOGY | Facility: CLINIC | Age: 62
End: 2022-01-28
Payer: COMMERCIAL

## 2022-01-28 DIAGNOSIS — E11.9 TYPE 2 DIABETES MELLITUS WITHOUT COMPLICATION, WITHOUT LONG-TERM CURRENT USE OF INSULIN (H): Primary | ICD-10-CM

## 2022-01-28 PROCEDURE — 99213 OFFICE O/P EST LOW 20 MIN: CPT | Mod: 95 | Performed by: INTERNAL MEDICINE

## 2022-01-28 RX ORDER — ATORVASTATIN CALCIUM 40 MG/1
40 TABLET, FILM COATED ORAL DAILY
Qty: 90 TABLET | Refills: 3 | Status: SHIPPED | OUTPATIENT
Start: 2022-01-28 | End: 2022-07-29

## 2022-01-28 NOTE — LETTER
1/28/2022       RE: Chema Mccullough  [address] [place] [address]     Dear Colleague,    Thank you for referring your patient, Chema Mccullough, to the [facility] ENDOCRINOLOG[facility] [place] at [facility]. Please see a copy of my visit note below.    Outcome for 01/25/22 12:28 PM: [employer] message sent   DEBBIE Garza        Outcome for 01/26/22 8:58 AM: Left Voicemail    Danyelle Field CMA    Outcome for 01/26/22 12:59 PM: Left Voicemail    Danyelle Field CMA    Outcome for 01/28/22 12:06 PM: Patient is not checking blood sugars   [facility]  Endocrinology  Cheri Watson MD  1/28/22    Chief Complaint:   Diabetes    History of Present Illness:   Chema Mccullough is a 61 year old male with a history of type 2 diabetes and goiter who presents for follow up of diabetes.    This is a virtual visit conducted by [facility].  Start time noon, stop time 1215, documentation time, coordination of care, 5 minutes.  Total time spent day of encounter 20 minutes.    #1 Diabetes mellitus type 2  The patient was diagnosed with prediabetes in 2004 and then in 2009 found to have fasting blood sugar of 184 with A1c of 7.5%.  He was started on Metformin XR in February 2009, dose increased to 500 mg twice daily in March 2013.  A1c from 2013 - 2016 was consistently in the 8% range.  November 2015 evaluation significant for detectable C-peptide of 3.2.  June 2016 his Metformin was increased to 2000 mg daily.  December 2017 he was started on Amaryl 1 mg daily as he was experiencing postprandial hyperglycemia. A1c between April 2018-October 2018 consistently in 6% range. August 2018 PSA was 0.8, creatinine was 0.75, hemoglobin A1c of 6.6, urine for microalbumin was negative, and LDL was 78.  August 2019 hemoglobin A1c was 6.9.  During his previous visit on 8/16/19 SGLT-2 inhibitors and GLP-1 agonists were discussed, but the  patient was hesitant to start them as they were new.  August 2019 hemoglobin A1c of 6.9.  February 2020 hemoglobin A1c is 7.1.    In February 2020, I had the patient try Jardiance at 10 mg daily and stop his glimepiride.   July 2020 hemoglobin A1c is 6.8, LDL 68, TSH 0.85.December 2020 urine for protein was negative, creatinine was 0.82, hemoglobin A1c of 6.9, LDL was 80.      Interval history: The patient remains on Jardiance at 10 mg daily and Metformin at 1000 mg twice daily.  His weight has been stable over the past year and reports that he currently weighs 170 pounds.  August 2021 LDL was 73, August 2021 when hemoglobin A1c 6.7, TSH 0.73.  Interestingly, the patient reports that being on the Jardiance has improved his mentation and reduced his anxiety.  He denies any side effects from the Jardiance.    Diabetes monitoring and complications:  CAD: No  Last eye exam results: 2/28/19 - No diabetic retinopathy  Microalbuminuria: Negative in December 2020  Neuropathy: No  HTN: No  On Statin: Yes  On Aspirin: Yes  Depression: No  Erectile dysfunction: No    #2 Hyperlipidemia  Stress test in 2016 was unremarkable. December 2017 LDL was 44. At that time he was taking Lipitor 40 mg daily. August 2019 LDL was 78.     Interval History  He continues on Lipitor daily.  August 2021 LDL was 73.    #3 Goiter  June 2016, he was noted to have a multinodular goiter with several nodules roughly 1 cm in size. October 2016 formal neck ultrasound showed multinodular goiter, with  largest nodules on the right side at 1.1 cm in size (two nodules) and the largest nodule on the left side at 1.2 cm in size.  The patient had declined ultrasound-guided FNA in October 2016. Eval with floor ultrasound in April 2017 showed the following: Right anterior nodule 1.0 x 0.6 x 0.6 cm in size, right posterior/inferior nodule at 1.0 x 0.5 x 1.0 cm in size, left inferior nodule at 0.6 x 0.5 x 0.7 cm in size. April 2018 US showed no interval change in  "thyroid nodules. Neck ultrasound performed in 2020 continues to show small nodules bilaterally which are unchanged in size. Repeat neck ultrasound in 2021 showed multiple nodules bilaterally, the largest about 1.1 cm in size-none met criteria for biopsy with no change compared with the previous exam in .    Interval History  No new symptoms.    Review of Systems:   Pertinent items are noted in HPI.  All other systems are negative.    Active Medications:     Current Outpatient Medications:      aspirin (ASA) 81 MG EC tablet, Take 1 tablet (81 mg) by mouth daily, Disp: 90 tablet, Rfl: 3     atorvastatin (LIPITOR) 40 MG tablet, Take 1 tablet (40 mg) by mouth daily Take one daily, Disp: 90 tablet, Rfl: 3     empagliflozin (JARDIANCE) 10 MG TABS tablet, Take 1 tablet (10 mg) by mouth daily, Disp: 90 tablet, Rfl: 4     MAGNESIUM PO, Pt. Unsure of dosage, Disp: , Rfl:      metFORMIN (GLUCOPHAGE) 1000 MG tablet, Take 1 tablet (1,000 mg) by mouth 2 times daily (with meals), Disp: 180 tablet, Rfl: 3     Omega-3 Fatty Acids (OMEGA 3 PO), Take by mouth daily, Disp: , Rfl:     Current Facility-Administered Medications:      lidocaine 1 % injection 2 mL, 2 mL, INTRA-ARTICULAR, Once, Camila Baeza MD      Allergies:   Patient has no known allergies.      Past Medical History:  Type 2 diabetes mellitus  Goiter  Coronary arteriosclerosis  Hyperlipidemia  Obesity  Colon adenoma     Past Surgical History:  No past surgical history on file.    Family History:   Diabetes: Mother, father      Social History:   Social History     Tobacco Use     Smoking status: Former Smoker     Packs/day: 1.00     Types: Cigarettes, Cigars     Start date: 10/10/1978     Quit date: 2011     Years since quittin.0     Smokeless tobacco: Former User   Substance Use Topics     Alcohol use: No     Drug use: No      Physical Exam:   GENERAL: Healthy, alert and no distress\",\"EYES: Eyes grossly normal to inspection.  No discharge " "or erythema, or obvious scleral/conjunctival abnormalities.\",\"RESP: No audible wheeze, cough, or visible cyanosis.  No visible retractions or increased work of breathing.  \",\"SKIN: Visible skin clear. No significant rash, abnormal pigmentation or lesions.\",\"NEURO: Cranial nerves grossly intact.  Mentation and speech appropriate for age.\",\"PSYCH: Mentation appears normal, affect normal/bright, judgement and insight intact, normal speech and appearance well-groomed.     Data:  August 2021 LDL 73, hemoglobin A1c 6.7, TSH 0.73    Assessment and Plan:  #1 Diabetes mellitus type 2  Patient to continue on Jardiance at 10 mg daily.  Patient to continue with Metformin at 1000 mg twice daily.  We will have patient repeat labs as noted below.  He will do this within the next month.  Return visit planned in 6 months.    #2 Hyperlipidemia  He is to continue on Lipitor.  August 2021 LDL satisfactory.    #3 Goiter  Not discussed today.  August 2021 TFTs normal.  Repeat neck ultrasound could be considered in April 2023.    Follow-up: Return visit planned in 6 months.  We will have patient check the labs as ordered below prior to the visit    Orders Placed This Encounter   Procedures     TSH     T4 free     Albumin Random Urine Quantitative with Creat Ratio     Hemoglobin A1c     Basic metabolic panel     Vitamin B12     25 Hydroxyvitamin D2 and D3      Chema is a 61 year old who is being evaluated via a billable video visit.      Patient is currently in [facility].     How would you like to obtain your AVS? MyChart  If the video visit is dropped, the invitation should be resent by: Send to e-mail at: [email or web address]  Will anyone else be joining your video visit? Wife is present      Cheri Watson MD      "

## 2022-01-28 NOTE — PROGRESS NOTES
Chema is a 61 year old who is being evaluated via a billable video visit.      Patient is currently in [facility].     How would you like to obtain your AVS? MyChart  If the video visit is dropped, the invitation should be resent by: Send to e-mail at: [email or web address]  Will anyone else be joining your video visit? Wife is present

## 2022-03-02 ENCOUNTER — TELEPHONE (OUTPATIENT)
Dept: ENDOCRINOLOGY | Facility: CLINIC | Age: 62
End: 2022-03-02

## 2022-03-02 ENCOUNTER — LAB (OUTPATIENT)
Dept: LAB | Facility: CLINIC | Age: 62
End: 2022-03-02
Payer: COMMERCIAL

## 2022-03-02 DIAGNOSIS — E11.9 TYPE 2 DIABETES MELLITUS WITHOUT COMPLICATION, WITHOUT LONG-TERM CURRENT USE OF INSULIN (H): ICD-10-CM

## 2022-03-02 LAB
ANION GAP SERPL CALCULATED.3IONS-SCNC: 3 MMOL/L (ref 3–14)
BUN SERPL-MCNC: 21 MG/DL (ref 7–30)
CALCIUM SERPL-MCNC: 9.6 MG/DL (ref 8.5–10.1)
CHLORIDE BLD-SCNC: 106 MMOL/L (ref 94–109)
CO2 SERPL-SCNC: 30 MMOL/L (ref 20–32)
CREAT SERPL-MCNC: 0.84 MG/DL (ref 0.66–1.25)
GFR SERPL CREATININE-BSD FRML MDRD: >90 ML/MIN/1.73M2
GLUCOSE BLD-MCNC: 148 MG/DL (ref 70–99)
POTASSIUM BLD-SCNC: 4.2 MMOL/L (ref 3.4–5.3)
SODIUM SERPL-SCNC: 139 MMOL/L (ref 133–144)
T4 FREE SERPL-MCNC: 0.98 NG/DL (ref 0.76–1.46)
TSH SERPL DL<=0.005 MIU/L-ACNC: 0.77 MU/L (ref 0.4–4)
VIT B12 SERPL-MCNC: 396 PG/ML (ref 193–986)

## 2022-03-02 PROCEDURE — 82043 UR ALBUMIN QUANTITATIVE: CPT

## 2022-03-02 PROCEDURE — 80048 BASIC METABOLIC PNL TOTAL CA: CPT

## 2022-03-02 PROCEDURE — 83036 HEMOGLOBIN GLYCOSYLATED A1C: CPT

## 2022-03-02 PROCEDURE — 36415 COLL VENOUS BLD VENIPUNCTURE: CPT

## 2022-03-02 PROCEDURE — 84439 ASSAY OF FREE THYROXINE: CPT

## 2022-03-02 PROCEDURE — 82607 VITAMIN B-12: CPT

## 2022-03-02 PROCEDURE — 84443 ASSAY THYROID STIM HORMONE: CPT

## 2022-03-02 PROCEDURE — 82306 VITAMIN D 25 HYDROXY: CPT

## 2022-03-02 NOTE — LETTER
Chema ANITA Mccullough  [address] [place] [address]        March 2, 2022    Dear Chema    I see that you do not have a follow-up appointment in endocrinology. I would recommend that you be evaluated by an endocrinologist to minimize complications. If you like to be seen at the [facility], please call [phone] select option #1 for an appointment.    Regards    Cheri Watson MD

## 2022-03-02 NOTE — RESULT ENCOUNTER NOTE
Dear Chema      Here are your labs which show a slightly higher hemoglobin A1c at 7.1.  The rest of your labs actually look pretty good.  Potentially, we can increase your Jardiance to 25 mg daily.  However, if you think there is room to change in your diet, that would also be reasonable and we can recheck labs in 6 months.    If you have any questions, please feel free to contact my nurse at [phone] select option #3 for triage nurse  or  option #1 for scheduling related questions.    Regards    Cheri Watson MD

## 2022-03-02 NOTE — LETTER
Patient:  Chema Mccullough  :   1960  MRN:     [id number]        Mr.Georgios ANITA Mccullough  [address] [place] [address]        2022    Dear Chema    Here are your lab test which look pretty good.  The main finding is the hemoglobin A1c slightly higher.  Everything else was negative. You know what to do...    If you have any questions, please feel free to contact my nurse at [phone] select option #3 for triage nurse  or  option #1 for scheduling related questions.    Regards    Cheri Watson MD     Resulted Orders   25 Hydroxyvitamin D2 and D3   Result Value Ref Range    25 OH Vitamin D2 <5 ug/L    25 OH Vitamin D3 24 ug/L    25 OH Vit D Total <29 20 - 75 ug/L      Comment:      Season, race, dietary intake, and treatment affect the concentration of 25-hydroxy-Vitamin D. Values may decrease during winter months and increase during summer months. Values 20-29 ug/L may indicate Vitamin D insufficiency and values <20 ug/L may indicate Vitamin D deficiency.    Narrative    This test was developed and its performance characteristics determined by the [facility],  Special Chemistry Laboratory. It has not been cleared or approved by the FDA. The laboratory is regulated under CLIA as qualified to perform high-complexity testing. This test is used for clinical purposes. It should not be regarded as investigational or for research.   Vitamin B12   Result Value Ref Range    Vitamin B12 396 193 - 986 pg/mL   Basic metabolic panel   Result Value Ref Range    Sodium 139 133 - 144 mmol/L    Potassium 4.2 3.4 - 5.3 mmol/L    Chloride 106 94 - 109 mmol/L    Carbon Dioxide (CO2) 30 20 - 32 mmol/L    Anion Gap 3 3 - 14 mmol/L    Urea Nitrogen 21 7 - 30 mg/dL    Creatinine 0.84 0.66 - 1.25 mg/dL    Calcium 9.6 8.5 - 10.1 mg/dL    Glucose 148 (H) 70 - 99 mg/dL    GFR Estimate >90 >60 mL/min/1.73m2      Comment:      Effective 2021 eGFRcr in adults is  calculated using the 2021 CKD-EPI creatinine equation which includes age and gender (Alicia boss al., NEJ, DOI: 10.1056/RKOJfp1967368)   Albumin Random Urine Quantitative with Creat Ratio   Result Value Ref Range    Creatinine Urine mg/dL 121 mg/dL    Albumin Urine mg/L 9 mg/L    Albumin Urine mg/g Cr 7.44 0.00 - 17.00 mg/g Cr   T4 free   Result Value Ref Range    Free T4 0.98 0.76 - 1.46 ng/dL   TSH   Result Value Ref Range    TSH 0.77 0.40 - 4.00 mU/L       [facility]

## 2022-03-03 LAB
CREAT UR-MCNC: 121 MG/DL
MICROALBUMIN UR-MCNC: 9 MG/L
MICROALBUMIN/CREAT UR: 7.44 MG/G CR (ref 0–17)

## 2022-03-06 LAB
DEPRECATED CALCIDIOL+CALCIFEROL SERPL-MC: <29 UG/L (ref 20–75)
VITAMIN D2 SERPL-MCNC: <5 UG/L
VITAMIN D3 SERPL-MCNC: 24 UG/L

## 2022-03-08 ENCOUNTER — TELEPHONE (OUTPATIENT)
Dept: ENDOCRINOLOGY | Facility: CLINIC | Age: 62
End: 2022-03-08
Payer: COMMERCIAL

## 2022-03-08 DIAGNOSIS — E11.9 TYPE 2 DIABETES MELLITUS WITHOUT COMPLICATION, WITHOUT LONG-TERM CURRENT USE OF INSULIN (H): Primary | ICD-10-CM

## 2022-03-08 NOTE — TELEPHONE ENCOUNTER
Called pt -labs look great - pt to make dietary changes - recheck lab in 6 months - Jardiance refilled at 10 mg daily for now  -  Dear Chema    Here are your lab test which look pretty good.  The main finding is the hemoglobin A1c slightly higher.  Everything else was negative. You know what to do...    If you have any questions, please feel free to contact my nurse at [phone] select option #3 for triage nurse  or  option #1 for scheduling related questions.    Regards    Cheri Watson MD

## 2022-03-08 NOTE — RESULT ENCOUNTER NOTE
Dear Chema    Here are your lab test which look pretty good.  The main finding is the hemoglobin A1c slightly higher.  Everything else was negative. You know what to do...    If you have any questions, please feel free to contact my nurse at [phone] select option #3 for triage nurse  or  option #1 for scheduling related questions.    Regards    Cheri Watson MD

## 2022-07-13 ENCOUNTER — TELEPHONE (OUTPATIENT)
Dept: ENDOCRINOLOGY | Facility: CLINIC | Age: 62
End: 2022-07-13

## 2022-07-13 DIAGNOSIS — E11.9 TYPE 2 DIABETES MELLITUS WITHOUT COMPLICATION, WITHOUT LONG-TERM CURRENT USE OF INSULIN (H): Primary | ICD-10-CM

## 2022-07-18 ENCOUNTER — LAB (OUTPATIENT)
Dept: LAB | Facility: CLINIC | Age: 62
End: 2022-07-18
Payer: COMMERCIAL

## 2022-07-18 DIAGNOSIS — E11.9 TYPE 2 DIABETES MELLITUS WITHOUT COMPLICATION, WITHOUT LONG-TERM CURRENT USE OF INSULIN (H): ICD-10-CM

## 2022-07-18 LAB
ANION GAP SERPL CALCULATED.3IONS-SCNC: <1 MMOL/L (ref 3–14)
BUN SERPL-MCNC: 12 MG/DL (ref 7–30)
CALCIUM SERPL-MCNC: 9.5 MG/DL (ref 8.5–10.1)
CHLORIDE BLD-SCNC: 108 MMOL/L (ref 94–109)
CHOLEST SERPL-MCNC: 130 MG/DL
CO2 SERPL-SCNC: 26 MMOL/L (ref 20–32)
CREAT SERPL-MCNC: 0.74 MG/DL (ref 0.66–1.25)
CREAT UR-MCNC: 52 MG/DL
FASTING STATUS PATIENT QL REPORTED: YES
GFR SERPL CREATININE-BSD FRML MDRD: >90 ML/MIN/1.73M2
GLUCOSE BLD-MCNC: 138 MG/DL (ref 70–99)
HBA1C MFR BLD: 7 % (ref 0–5.6)
HDLC SERPL-MCNC: 42 MG/DL
LDLC SERPL CALC-MCNC: 73 MG/DL
MICROALBUMIN UR-MCNC: 5 MG/L
MICROALBUMIN/CREAT UR: 9.62 MG/G CR (ref 0–17)
NONHDLC SERPL-MCNC: 88 MG/DL
POTASSIUM BLD-SCNC: 4 MMOL/L (ref 3.4–5.3)
SODIUM SERPL-SCNC: 133 MMOL/L (ref 133–144)
TRIGL SERPL-MCNC: 75 MG/DL
TSH SERPL DL<=0.005 MIU/L-ACNC: 0.71 MU/L (ref 0.4–4)

## 2022-07-18 PROCEDURE — 82043 UR ALBUMIN QUANTITATIVE: CPT | Performed by: PATHOLOGY

## 2022-07-18 PROCEDURE — 99000 SPECIMEN HANDLING OFFICE-LAB: CPT | Performed by: PATHOLOGY

## 2022-07-18 PROCEDURE — 83036 HEMOGLOBIN GLYCOSYLATED A1C: CPT | Performed by: PATHOLOGY

## 2022-07-18 PROCEDURE — 36415 COLL VENOUS BLD VENIPUNCTURE: CPT | Performed by: PATHOLOGY

## 2022-07-18 PROCEDURE — 80061 LIPID PANEL: CPT | Performed by: PATHOLOGY

## 2022-07-18 PROCEDURE — 80048 BASIC METABOLIC PNL TOTAL CA: CPT | Performed by: PATHOLOGY

## 2022-07-18 PROCEDURE — 84443 ASSAY THYROID STIM HORMONE: CPT | Performed by: PATHOLOGY

## 2022-07-18 PROCEDURE — 82306 VITAMIN D 25 HYDROXY: CPT | Mod: 90 | Performed by: PATHOLOGY

## 2022-07-18 NOTE — LETTER
Patient:  Chema Mccullough  :   1960  MRN:     [id number]        Mr.Georgios ANITA Mccullough  [address] [place] [address]        2022    Dear Chema    Here are your lab results.  Other than your sugar being slightly higher, everything else looks great.  I look forward to seeing you in follow-up.    If you have any questions, please feel free to contact my nurse at [phone] select option #3 for triage nurse  or  option #1 for scheduling related questions.    Regards    Cheri Watson MD     Resulted Orders   25 Hydroxyvitamin D2 and D3   Result Value Ref Range    25 OH Vitamin D2 <5 ug/L    25 OH Vitamin D3 31 ug/L    25 OH Vit D Total <36 20 - 75 ug/L      Comment:      Season, race, dietary intake, and treatment affect the concentration of 25-hydroxy-Vitamin D. Values may decrease during winter months and increase during summer months. Values 20-29 ug/L may indicate Vitamin D insufficiency and values <20 ug/L may indicate Vitamin D deficiency.    Narrative    This test was developed and its performance characteristics determined by the [facility],  Special Chemistry Laboratory. It has not been cleared or approved by the FDA. The laboratory is regulated under CLIA as qualified to perform high-complexity testing. This test is used for clinical purposes. It should not be regarded as investigational or for research.   TSH   Result Value Ref Range    TSH 0.71 0.40 - 4.00 mU/L   Lipid Profile   Result Value Ref Range    Cholesterol 130 <200 mg/dL    Triglycerides 75 <150 mg/dL    Direct Measure HDL 42 >=40 mg/dL    LDL Cholesterol Calculated 73 <=100 mg/dL    Non HDL Cholesterol 88 <130 mg/dL    Patient Fasting > 8hrs? Yes     Narrative    Cholesterol  Desirable:  <200 mg/dL    Triglycerides  Normal:  Less than 150 mg/dL  Borderline High:  150-199 mg/dL  High:  200-499 mg/dL  Very High:  Greater than or equal to 500 mg/dL    Direct Measure HDL  Female:   Greater than or equal to 50 mg/dL   Male:  Greater than or equal to 40 mg/dL    LDL Cholesterol  Desirable:  <100mg/dL  Above Desirable:  100-129 mg/dL   Borderline High:  130-159 mg/dL   High:  160-189 mg/dL   Very High:  >= 190 mg/dL    Non HDL Cholesterol  Desirable:  130 mg/dL  Above Desirable:  130-159 mg/dL  Borderline High:  160-189 mg/dL  High:  190-219 mg/dL  Very High:  Greater than or equal to 220 mg/dL   Albumin Random Urine Quantitative with Creat Ratio   Result Value Ref Range    Creatinine Urine mg/dL 52 mg/dL    Albumin Urine mg/L 5 mg/L    Albumin Urine mg/g Cr 9.62 0.00 - 17.00 mg/g Cr   Basic metabolic panel   Result Value Ref Range    Sodium 133 133 - 144 mmol/L    Potassium 4.0 3.4 - 5.3 mmol/L    Chloride 108 94 - 109 mmol/L    Carbon Dioxide (CO2) 26 20 - 32 mmol/L    Anion Gap <1 (L) 3 - 14 mmol/L    Urea Nitrogen 12 7 - 30 mg/dL    Creatinine 0.74 0.66 - 1.25 mg/dL    Calcium 9.5 8.5 - 10.1 mg/dL    Glucose 138 (H) 70 - 99 mg/dL    GFR Estimate >90 >60 mL/min/1.73m2      Comment:      Effective December 21, 2021 eGFRcr in adults is calculated using the 2021 CKD-EPI creatinine equation which includes age and gender (Alicia boss al., NEJM, DOI: 10.1056/ZIZVmp5616215)   Hemoglobin A1c   Result Value Ref Range    Hemoglobin A1C 7.0 (H) 0.0 - 5.6 %      Comment:      Normal <5.7%   Prediabetes 5.7-6.4%    Diabetes 6.5% or higher     Note: Adopted from ADA consensus guidelines.       Lab

## 2022-07-21 NOTE — PROGRESS NOTES
Outcome for 07/21/22 3:28 PM: Mychart message sent  DEBBIE Garza  Outcome for 07/27/22 2:14 PM: Patient is not checking blood sugars  Lynn Montesinos, VF

## 2022-07-22 LAB
DEPRECATED CALCIDIOL+CALCIFEROL SERPL-MC: <36 UG/L (ref 20–75)
VITAMIN D2 SERPL-MCNC: <5 UG/L
VITAMIN D3 SERPL-MCNC: 31 UG/L

## 2022-07-25 ENCOUNTER — TELEPHONE (OUTPATIENT)
Dept: ENDOCRINOLOGY | Facility: CLINIC | Age: 62
End: 2022-07-25

## 2022-07-25 NOTE — TELEPHONE ENCOUNTER
Pt has upcoming appt    -  Dear Chema    Here are your lab results.  Other than your sugar being slightly higher, everything else looks great.  I look forward to seeing you in follow-up.    If you have any questions, please feel free to contact my nurse at [phone] select option #3 for triage nurse  or  option #1 for scheduling related questions.    Regards    Cheri Watson MD     No visits with results within 1 Week(s) from this visit.   Latest known visit with results is:   Lab on 07/18/2022   Component Date Value Ref Range Status     25 OH Vitamin D2 07/18/2022 <5  ug/L Final     25 OH Vitamin D3 07/18/2022 31  ug/L Final     25 OH Vit D Total 07/18/2022 <36  20 - 75 ug/L Final    Season, race, dietary intake, and treatment affect the concentration of 25-hydroxy-Vitamin D. Values may decrease during winter months and increase during summer months. Values 20-29 ug/L may indicate Vitamin D insufficiency and values <20 ug/L may indicate Vitamin D deficiency.     TSH 07/18/2022 0.71  0.40 - 4.00 mU/L Final     Cholesterol 07/18/2022 130  <200 mg/dL Final     Triglycerides 07/18/2022 75  <150 mg/dL Final     Direct Measure HDL 07/18/2022 42  >=40 mg/dL Final     LDL Cholesterol Calculated 07/18/2022 73  <=100 mg/dL Final     Non HDL Cholesterol 07/18/2022 88  <130 mg/dL Final     Patient Fasting > 8hrs? 07/18/2022 Yes   Final     Creatinine Urine mg/dL 07/18/2022 52  mg/dL Final     Albumin Urine mg/L 07/18/2022 5  mg/L Final     Albumin Urine mg/g Cr 07/18/2022 9.62  0.00 - 17.00 mg/g Cr Final     Sodium 07/18/2022 133  133 - 144 mmol/L Final     Potassium 07/18/2022 4.0  3.4 - 5.3 mmol/L Final     Chloride 07/18/2022 108  94 - 109 mmol/L Final     Carbon Dioxide (CO2) 07/18/2022 26  20 - 32 mmol/L Final     Anion Gap 07/18/2022 <1 (A) 3 - 14 mmol/L Final     Urea Nitrogen 07/18/2022 12  7 - 30 mg/dL Final     Creatinine 07/18/2022 0.74  0.66 - 1.25 mg/dL Final     Calcium 07/18/2022 9.5  8.5 - 10.1 mg/dL Final      Glucose 07/18/2022 138 (A) 70 - 99 mg/dL Final     GFR Estimate 07/18/2022 >90  >60 mL/min/1.73m2 Final    Effective December 21, 2021 eGFRcr in adults is calculated using the 2021 CKD-EPI creatinine equation which includes age and gender (Alicia et al., NEJ, DOI: 10.1056/CQTEns4931407)     Hemoglobin A1C 07/18/2022 7.0 (A) 0.0 - 5.6 % Final    Normal <5.7%   Prediabetes 5.7-6.4%    Diabetes 6.5% or higher     Note: Adopted from ADA consensus guidelines.

## 2022-07-25 NOTE — RESULT ENCOUNTER NOTE
Dear Chema    Here are your lab results.  Other than your sugar being slightly higher, everything else looks great.  I look forward to seeing you in follow-up.    If you have any questions, please feel free to contact my nurse at [phone] select option #3 for triage nurse  or  option #1 for scheduling related questions.    Regards    Cheri Watson MD

## 2022-07-28 PROBLEM — R07.9 CHEST PAIN: Status: ACTIVE | Noted: 2022-07-28

## 2022-07-28 PROBLEM — Z82.49 FAMILY HISTORY OF PREMATURE CORONARY HEART DISEASE: Status: ACTIVE | Noted: 2022-07-28

## 2022-07-28 NOTE — PROGRESS NOTES
HPI:  Patient presents for a diabetic eye exam. He has type 2 diabetes mellitus and the last HA1C was 7.0 on 07/18/2022. Patient complains of mild near blur in both eyes.   Social History: from [place]. Patient is a  for [employer]. Going to [facility] today.       Pertinent Medical History:    Type 2 diabetes mellitus    Chest pain. 07/28/2022    Coronary atherosclerosis    Ocular History:    Myopia both eyes    Dermatochalasis, both eyes.     Cataract, both eyes.     Eye Medications:    None.     Assessment and Plan:  1.   No diabetic retinopathy, both eyes.     Goal is to keep HA1C under 7.    Annual diabetic eye exam.      2.   Myopia, both eyes.     Increase ADD - prescription given.     3.   Dermatochalasis, both ey es.     Visually significant but patient is not bothered. Continue to monitor.     4.   Cataract, both eyes.     Mildly visually significant and contributing to near blur - monitor for now. Will try updated glasses prescription. Dry eye is also contributory to mild blur in both eyes.    5.   Macular Drusen and Mild Pigmentary changes, left eye.     Former smoker - for 27 years. Has not smoked for the past 12 years.     Drusen size - borderline AREDS criteria. Will monitor for now.     Recommend UV protection.    Recommend fish and green leafy vegetables 3 days per week.     Patient defers macular OCT - need to catch a flight to [facility]. Patient will return for tech only macular OCT and fundus photos in the next few weeks.      Dilation and repeat macular OCT in 6 months as well.     6.   Dry Eye Syndrome, both eyes.     Contributing to blurry vision as well.     Start preservative free artificial tears 4 times daily both eyes.         Patient consented to a dilated eye exam:    Yes. Side effects discussed.    Medical History:  Past Medical History:   Diagnosis Date     Diabetes (H)        Medications:  Current Outpatient Medications   Medication Sig Dispense Refill     aspirin (ASA) 81 MG  EC tablet Take 1 tablet (81 mg) by mouth daily 90 tablet 3     atorvastatin (LIPITOR) 40 MG tablet Take 1 tablet (40 mg) by mouth daily Take one daily 90 tablet 3     empagliflozin (JARDIANCE) 10 MG TABS tablet Take 1 tablet (10 mg) by mouth daily 90 tablet 4     MAGNESIUM PO Pt. Unsure of dosage       metFORMIN (GLUCOPHAGE) 1000 MG tablet Take 1 tablet (1,000 mg) by mouth 2 times daily (with meals) 180 tablet 3     Omega-3 Fatty Acids (OMEGA 3 PO) Take by mouth daily     Complete documentation of historical and exam elements from today's encounter can be found in the full encounter summary report (not reduplicated in this progress note). I personally obtained the chief complaint(s) and history of present illness.  I confirmed and edited as necessary the review of systems, past medical/surgical history, family history, social history, and examination findings as documented by others; and I examined the patient myself. I personally reviewed the relevant tests, images, and reports as documented above. I formulated and edited as necessary the assessment and plan and discussed the findings and management plan with the patient and family. - Valente Krishnamurthy OD

## 2022-07-29 ENCOUNTER — VIRTUAL VISIT (OUTPATIENT)
Dept: ENDOCRINOLOGY | Facility: CLINIC | Age: 62
End: 2022-07-29
Payer: COMMERCIAL

## 2022-07-29 DIAGNOSIS — E11.9 TYPE 2 DIABETES MELLITUS WITHOUT COMPLICATION, WITHOUT LONG-TERM CURRENT USE OF INSULIN (H): Primary | ICD-10-CM

## 2022-07-29 PROCEDURE — 99214 OFFICE O/P EST MOD 30 MIN: CPT | Mod: 95 | Performed by: INTERNAL MEDICINE

## 2022-07-29 RX ORDER — ROSUVASTATIN CALCIUM 10 MG/1
10 TABLET, COATED ORAL DAILY
Qty: 90 TABLET | Refills: 3 | Status: SHIPPED | OUTPATIENT
Start: 2022-07-29 | End: 2023-03-17

## 2022-07-29 NOTE — PATIENT INSTRUCTIONS
Stop the atorvastatin    Rosuvstatin at 10 mg at bedtime    Eating window of 10-6 (8 hours)    Stop fish oil    Exercise about 1 hour per day

## 2022-07-29 NOTE — LETTER
7/29/2022       RE: Chema Mccullough  [address] [place] [address]     Dear Colleague,    Thank you for referring your patient, Chema Mccullough, to the [facility] ENDOCRINOLOGY [facility] [place] [facility]. Please see a copy of my visit note below.    Outcome for 07/21/22 3:28 PM: Interact.iot message sent  DEBBIE Garza  Outcome for 07/27/22 2:14 PM: Patient is not checking blood sugars  DEBBIE Garza          [facility]  Endocrinology  Cheri Watson MD  7/28/22    Chief Complaint:   Diabetes    History of Present Illness:   Chema Mccullough is a 62 year old male with a history of type 2 diabetes and goiter who presents for follow up of diabetes.    Start time 835, stop time 9:00, chart review, documentation time, coordination of care, 5 minutes, total time spent day of encounter 30 minutes    #1 Diabetes mellitus type 2  The patient was diagnosed with prediabetes in 2004 and then in 2009 found to have fasting blood sugar of 184 with A1c of 7.5%.  He was started on Metformin XR in February 2009, dose increased to 500 mg twice daily in March 2013.  A1c from 2013 - 2016 was consistently in the 8% range.  November 2015 evaluation significant for detectable C-peptide of 3.2.  June 2016 his Metformin was increased to 2000 mg daily.  December 2017 he was started on Amaryl 1 mg daily as he was experiencing postprandial hyperglycemia. A1c between April 2018-October 2018 consistently in 6% range. August 2018 PSA was 0.8, creatinine was 0.75, hemoglobin A1c of 6.6, urine for microalbumin was negative, and LDL was 78.  August 2019 hemoglobin A1c was 6.9.  During his previous visit on 8/16/19 SGLT-2 inhibitors and GLP-1 agonists were discussed, but the patient was hesitant to start them as they were new.  August 2019 hemoglobin A1c of 6.9.  February 2020 hemoglobin A1c is 7.1.    In February 2020, I had the patient try Jardiance at 10 mg  daily and stop his glimepiride.   July 2020 hemoglobin A1c is 6.8, LDL 68, TSH 0.85.December 2020 urine for protein was negative, creatinine was 0.82, hemoglobin A1c of 6.9, LDL was 80.   August 2021 LDL was 73, August 2021 when hemoglobin A1c 6.7, TSH 0.73    Interval history: The patient remains on Jardiance at 10 mg daily and Metformin at 1000 mg twice daily.  December 2021 hemoglobin A1c at 7.4, March 2022 hemoglobin A1c of 7.1, July 2022 hemoglobin A1c at 7.0.  The patient reports he has been making changes to his diet, namely eating earlier during the day.  He is wondering whether his atorvastatin dose could be reduced.    Diabetes monitoring and complications:  CAD: No  Last eye exam results: 2/28/19 - No diabetic retinopathy  Microalbuminuria: Negative in July 2022  Neuropathy: No  HTN: No  On Statin: Yes  On Aspirin: Yes  Depression: No  Erectile dysfunction: No    #2 Hyperlipidemia  Stress test in 2016 was unremarkable. December 2017 LDL was 44. At that time he was taking Lipitor 40 mg daily. August 2019 LDL was 78.  August 2021 LDL was 73.    Interval History  He continues on Lipitor 40 mg daily-he is wondering if this could be reduced.  July 2022 LDL 73.    #3 Goiter  June 2016, he was noted to have a multinodular goiter with several nodules roughly 1 cm in size. October 2016 formal neck ultrasound showed multinodular goiter, with  largest nodules on the right side at 1.1 cm in size (two nodules) and the largest nodule on the left side at 1.2 cm in size.  The patient had declined ultrasound-guided FNA in October 2016. Eval with floor ultrasound in April 2017 showed the following: Right anterior nodule 1.0 x 0.6 x 0.6 cm in size, right posterior/inferior nodule at 1.0 x 0.5 x 1.0 cm in size, left inferior nodule at 0.6 x 0.5 x 0.7 cm in size. April 2018 US showed no interval change in thyroid nodules. Neck ultrasound performed in August 2020 continues to show small nodules bilaterally which are unchanged  "in size. Repeat neck ultrasound in 2021 showed multiple nodules bilaterally, the largest about 1.1 cm in size-none met criteria for biopsy with no change compared with the previous exam in .    Interval History  No new symptoms.      Review of Systems:   Pertinent items are noted in HPI.  All other systems are negative.    Active Medications:     Current Outpatient Medications:      aspirin (ASA) 81 MG EC tablet, Take 1 tablet (81 mg) by mouth daily, Disp: 90 tablet, Rfl: 3     atorvastatin (LIPITOR) 40 MG tablet, Take 1 tablet (40 mg) by mouth daily Take one daily, Disp: 90 tablet, Rfl: 3     empagliflozin (JARDIANCE) 10 MG TABS tablet, Take 1 tablet (10 mg) by mouth daily, Disp: 90 tablet, Rfl: 4     MAGNESIUM PO, Pt. Unsure of dosage, Disp: , Rfl:      metFORMIN (GLUCOPHAGE) 1000 MG tablet, Take 1 tablet (1,000 mg) by mouth 2 times daily (with meals), Disp: 180 tablet, Rfl: 3     Omega-3 Fatty Acids (OMEGA 3 PO), Take by mouth daily, Disp: , Rfl:     Current Facility-Administered Medications:      lidocaine 1 % injection 2 mL, 2 mL, INTRA-ARTICULAR, Once, Camila Baeza MD      Allergies:   Patient has no known allergies.      Past Medical History:  Type 2 diabetes mellitus  Goiter  Coronary arteriosclerosis  Hyperlipidemia  Obesity  Colon adenoma     Past Surgical History:  No past surgical history on file.    Family History:   Diabetes: Mother, father      Social History:   Social History     Tobacco Use     Smoking status: Former Smoker     Packs/day: 1.00     Types: Cigarettes, Cigars     Start date: 10/10/1978     Quit date: 2011     Years since quittin.5     Smokeless tobacco: Former User   Substance Use Topics     Alcohol use: No     Drug use: No        Physical Exam:   GENERAL: Healthy, alert and no distress\",\"EYES: Eyes grossly normal to inspection.  No discharge or erythema, or obvious scleral/conjunctival abnormalities.\",\"RESP: No audible wheeze, cough, or visible cyanosis.  No " "visible retractions or increased work of breathing.  \",\"SKIN: Visible skin clear. No significant rash, abnormal pigmentation or lesions.\",\"NEURO: Cranial nerves grossly intact.  Mentation and speech appropriate for age.\",\"PSYCH: Mentation appears normal, affect normal/bright, judgement and insight intact, normal speech and appearance well-groomed.       Data:  No visits with results within 1 Week(s) from this visit.   Latest known visit with results is:   Lab on 07/18/2022   Component Date Value Ref Range Status     25 OH Vitamin D2 07/18/2022 <5  ug/L Final     25 OH Vitamin D3 07/18/2022 31  ug/L Final     25 OH Vit D Total 07/18/2022 <36  20 - 75 ug/L Final    Season, race, dietary intake, and treatment affect the concentration of 25-hydroxy-Vitamin D. Values may decrease during winter months and increase during summer months. Values 20-29 ug/L may indicate Vitamin D insufficiency and values <20 ug/L may indicate Vitamin D deficiency.     TSH 07/18/2022 0.71  0.40 - 4.00 mU/L Final     Cholesterol 07/18/2022 130  <200 mg/dL Final     Triglycerides 07/18/2022 75  <150 mg/dL Final     Direct Measure HDL 07/18/2022 42  >=40 mg/dL Final     LDL Cholesterol Calculated 07/18/2022 73  <=100 mg/dL Final     Non HDL Cholesterol 07/18/2022 88  <130 mg/dL Final     Patient Fasting > 8hrs? 07/18/2022 Yes   Final     Creatinine Urine mg/dL 07/18/2022 52  mg/dL Final     Albumin Urine mg/L 07/18/2022 5  mg/L Final     Albumin Urine mg/g Cr 07/18/2022 9.62  0.00 - 17.00 mg/g Cr Final     Sodium 07/18/2022 133  133 - 144 mmol/L Final     Potassium 07/18/2022 4.0  3.4 - 5.3 mmol/L Final     Chloride 07/18/2022 108  94 - 109 mmol/L Final     Carbon Dioxide (CO2) 07/18/2022 26  20 - 32 mmol/L Final     Anion Gap 07/18/2022 <1 (A) 3 - 14 mmol/L Final     Urea Nitrogen 07/18/2022 12  7 - 30 mg/dL Final     Creatinine 07/18/2022 0.74  0.66 - 1.25 mg/dL Final     Calcium 07/18/2022 9.5  8.5 - 10.1 mg/dL Final     Glucose 07/18/2022 138 " (A) 70 - 99 mg/dL Final     GFR Estimate 07/18/2022 >90  >60 mL/min/1.73m2 Final    Effective December 21, 2021 eGFRcr in adults is calculated using the 2021 CKD-EPI creatinine equation which includes age and gender (Alicia et al., NEJ, DOI: 10.1056/YNXMdn8125279)     Hemoglobin A1C 07/18/2022 7.0 (A) 0.0 - 5.6 % Final    Normal <5.7%   Prediabetes 5.7-6.4%    Diabetes 6.5% or higher     Note: Adopted from ADA consensus guidelines.       Assessment and Plan:  #1 Diabetes mellitus type 2  July 2022 hemoglobin A1c at 7.0.  Patient to continue on Jardiance at 10 mg daily.  Patient to continue with Metformin at 1000 mg twice daily.  Recommended that patient increase his exercise to 1 hour/day, and consider time restricted eating.  Patient has upcoming eye exam-we will see if this can be moved sooner    #2 Hyperlipidemia  August 2022 LDL satisfactory.  However the patient is interested in reducing his Lipitor dosage.  I think his LDL is great at 73.  That being said, I think we can try stopping the atorvastatin changing him to rosuvastatin at 10 mg daily.  Prescription for rosuvastatin sent to pharmacy.  We will call the patient in a few weeks to assess his tolerance of the rosuvastatin.  We will have patient recheck LFTs and lipid profile with return visit.    #3 Goiter  Not discussed today.  July 2022 TFTs normal.  Repeat neck ultrasound could be considered in April 2023.    Follow-up: Return visit planned in 6 months.  We will have patient check the labs as ordered below prior to the visit    Orders Placed This Encounter   Procedures     25 Hydroxyvitamin D2 and D3     TSH     Lipid Profile     Basic metabolic panel     Hemoglobin A1c       Again, thank you for allowing me to participate in the care of your patient.      Sincerely,    Cheri Watson MD

## 2022-07-29 NOTE — PROGRESS NOTES
[facility]  Endocrinology  Cheri Watson MD  7/28/22    Chief Complaint:   Diabetes    History of Present Illness:   Chema Mccullough is a 62 year old male with a history of type 2 diabetes and goiter who presents for follow up of diabetes.    Start time 835, stop time 9:00, chart review, documentation time, coordination of care, 5 minutes, total time spent day of encounter 30 minutes    #1 Diabetes mellitus type 2  The patient was diagnosed with prediabetes in 2004 and then in 2009 found to have fasting blood sugar of 184 with A1c of 7.5%.  He was started on Metformin XR in February 2009, dose increased to 500 mg twice daily in March 2013.  A1c from 2013 - 2016 was consistently in the 8% range.  November 2015 evaluation significant for detectable C-peptide of 3.2.  June 2016 his Metformin was increased to 2000 mg daily.  December 2017 he was started on Amaryl 1 mg daily as he was experiencing postprandial hyperglycemia. A1c between April 2018-October 2018 consistently in 6% range. August 2018 PSA was 0.8, creatinine was 0.75, hemoglobin A1c of 6.6, urine for microalbumin was negative, and LDL was 78.  August 2019 hemoglobin A1c was 6.9.  During his previous visit on 8/16/19 SGLT-2 inhibitors and GLP-1 agonists were discussed, but the patient was hesitant to start them as they were new.  August 2019 hemoglobin A1c of 6.9.  February 2020 hemoglobin A1c is 7.1.    In February 2020, I had the patient try Jardiance at 10 mg daily and stop his glimepiride.   July 2020 hemoglobin A1c is 6.8, LDL 68, TSH 0.85.December 2020 urine for protein was negative, creatinine was 0.82, hemoglobin A1c of 6.9, LDL was 80.   August 2021 LDL was 73, August 2021 when hemoglobin A1c 6.7, TSH 0.73    Interval history: The patient remains on Jardiance at 10 mg daily and Metformin at 1000 mg twice daily.  December 2021 hemoglobin A1c at 7.4, March 2022 hemoglobin A1c of 7.1, July 2022 hemoglobin A1c at 7.0.  The patient reports he has been  making changes to his diet, namely eating earlier during the day.  He is wondering whether his atorvastatin dose could be reduced.    Diabetes monitoring and complications:  CAD: No  Last eye exam results: 2/28/19 - No diabetic retinopathy  Microalbuminuria: Negative in July 2022  Neuropathy: No  HTN: No  On Statin: Yes  On Aspirin: Yes  Depression: No  Erectile dysfunction: No    #2 Hyperlipidemia  Stress test in 2016 was unremarkable. December 2017 LDL was 44. At that time he was taking Lipitor 40 mg daily. August 2019 LDL was 78.  August 2021 LDL was 73.    Interval History  He continues on Lipitor 40 mg daily-he is wondering if this could be reduced.  July 2022 LDL 73.    #3 Goiter  June 2016, he was noted to have a multinodular goiter with several nodules roughly 1 cm in size. October 2016 formal neck ultrasound showed multinodular goiter, with  largest nodules on the right side at 1.1 cm in size (two nodules) and the largest nodule on the left side at 1.2 cm in size.  The patient had declined ultrasound-guided FNA in October 2016. Eval with floor ultrasound in April 2017 showed the following: Right anterior nodule 1.0 x 0.6 x 0.6 cm in size, right posterior/inferior nodule at 1.0 x 0.5 x 1.0 cm in size, left inferior nodule at 0.6 x 0.5 x 0.7 cm in size. April 2018 US showed no interval change in thyroid nodules. Neck ultrasound performed in August 2020 continues to show small nodules bilaterally which are unchanged in size. Repeat neck ultrasound in April 2021 showed multiple nodules bilaterally, the largest about 1.1 cm in size-none met criteria for biopsy with no change compared with the previous exam in 2020.    Interval History  No new symptoms.      Review of Systems:   Pertinent items are noted in HPI.  All other systems are negative.    Active Medications:     Current Outpatient Medications:      aspirin (ASA) 81 MG EC tablet, Take 1 tablet (81 mg) by mouth daily, Disp: 90 tablet, Rfl: 3      "atorvastatin (LIPITOR) 40 MG tablet, Take 1 tablet (40 mg) by mouth daily Take one daily, Disp: 90 tablet, Rfl: 3     empagliflozin (JARDIANCE) 10 MG TABS tablet, Take 1 tablet (10 mg) by mouth daily, Disp: 90 tablet, Rfl: 4     MAGNESIUM PO, Pt. Unsure of dosage, Disp: , Rfl:      metFORMIN (GLUCOPHAGE) 1000 MG tablet, Take 1 tablet (1,000 mg) by mouth 2 times daily (with meals), Disp: 180 tablet, Rfl: 3     Omega-3 Fatty Acids (OMEGA 3 PO), Take by mouth daily, Disp: , Rfl:     Current Facility-Administered Medications:      lidocaine 1 % injection 2 mL, 2 mL, INTRA-ARTICULAR, Once, Camila Baeza MD      Allergies:   Patient has no known allergies.      Past Medical History:  Type 2 diabetes mellitus  Goiter  Coronary arteriosclerosis  Hyperlipidemia  Obesity  Colon adenoma     Past Surgical History:  No past surgical history on file.    Family History:   Diabetes: Mother, father      Social History:   Social History     Tobacco Use     Smoking status: Former Smoker     Packs/day: 1.00     Types: Cigarettes, Cigars     Start date: 10/10/1978     Quit date: 2011     Years since quittin.5     Smokeless tobacco: Former User   Substance Use Topics     Alcohol use: No     Drug use: No        Physical Exam:   GENERAL: Healthy, alert and no distress\",\"EYES: Eyes grossly normal to inspection.  No discharge or erythema, or obvious scleral/conjunctival abnormalities.\",\"RESP: No audible wheeze, cough, or visible cyanosis.  No visible retractions or increased work of breathing.  \",\"SKIN: Visible skin clear. No significant rash, abnormal pigmentation or lesions.\",\"NEURO: Cranial nerves grossly intact.  Mentation and speech appropriate for age.\",\"PSYCH: Mentation appears normal, affect normal/bright, judgement and insight intact, normal speech and appearance well-groomed.       Data:  No visits with results within 1 Week(s) from this visit.   Latest known visit with results is:   Lab on 2022   Component " Date Value Ref Range Status     25 OH Vitamin D2 07/18/2022 <5  ug/L Final     25 OH Vitamin D3 07/18/2022 31  ug/L Final     25 OH Vit D Total 07/18/2022 <36  20 - 75 ug/L Final    Season, race, dietary intake, and treatment affect the concentration of 25-hydroxy-Vitamin D. Values may decrease during winter months and increase during summer months. Values 20-29 ug/L may indicate Vitamin D insufficiency and values <20 ug/L may indicate Vitamin D deficiency.     TSH 07/18/2022 0.71  0.40 - 4.00 mU/L Final     Cholesterol 07/18/2022 130  <200 mg/dL Final     Triglycerides 07/18/2022 75  <150 mg/dL Final     Direct Measure HDL 07/18/2022 42  >=40 mg/dL Final     LDL Cholesterol Calculated 07/18/2022 73  <=100 mg/dL Final     Non HDL Cholesterol 07/18/2022 88  <130 mg/dL Final     Patient Fasting > 8hrs? 07/18/2022 Yes   Final     Creatinine Urine mg/dL 07/18/2022 52  mg/dL Final     Albumin Urine mg/L 07/18/2022 5  mg/L Final     Albumin Urine mg/g Cr 07/18/2022 9.62  0.00 - 17.00 mg/g Cr Final     Sodium 07/18/2022 133  133 - 144 mmol/L Final     Potassium 07/18/2022 4.0  3.4 - 5.3 mmol/L Final     Chloride 07/18/2022 108  94 - 109 mmol/L Final     Carbon Dioxide (CO2) 07/18/2022 26  20 - 32 mmol/L Final     Anion Gap 07/18/2022 <1 (A) 3 - 14 mmol/L Final     Urea Nitrogen 07/18/2022 12  7 - 30 mg/dL Final     Creatinine 07/18/2022 0.74  0.66 - 1.25 mg/dL Final     Calcium 07/18/2022 9.5  8.5 - 10.1 mg/dL Final     Glucose 07/18/2022 138 (A) 70 - 99 mg/dL Final     GFR Estimate 07/18/2022 >90  >60 mL/min/1.73m2 Final    Effective December 21, 2021 eGFRcr in adults is calculated using the 2021 CKD-EPI creatinine equation which includes age and gender (Alicia et al., NEJM, DOI: 10.1056/LNJRqf9363288)     Hemoglobin A1C 07/18/2022 7.0 (A) 0.0 - 5.6 % Final    Normal <5.7%   Prediabetes 5.7-6.4%    Diabetes 6.5% or higher     Note: Adopted from ADA consensus guidelines.       Assessment and Plan:  #1 Diabetes mellitus type  2  July 2022 hemoglobin A1c at 7.0.  Patient to continue on Jardiance at 10 mg daily.  Patient to continue with Metformin at 1000 mg twice daily.  Recommended that patient increase his exercise to 1 hour/day, and consider time restricted eating.  Patient has upcoming eye exam-we will see if this can be moved sooner    #2 Hyperlipidemia  August 2022 LDL satisfactory.  However the patient is interested in reducing his Lipitor dosage.  I think his LDL is great at 73.  That being said, I think we can try stopping the atorvastatin changing him to rosuvastatin at 10 mg daily.  Prescription for rosuvastatin sent to pharmacy.  We will call the patient in a few weeks to assess his tolerance of the rosuvastatin.  We will have patient recheck LFTs and lipid profile with return visit.    #3 Goiter  Not discussed today.  July 2022 TFTs normal.  Repeat neck ultrasound could be considered in April 2023.    Follow-up: Return visit planned in 6 months.  We will have patient check the labs as ordered below prior to the visit    Orders Placed This Encounter   Procedures     25 Hydroxyvitamin D2 and D3     TSH     Lipid Profile     Basic metabolic panel     Hemoglobin A1c

## 2022-08-08 ENCOUNTER — OFFICE VISIT (OUTPATIENT)
Dept: OPHTHALMOLOGY | Facility: CLINIC | Age: 62
End: 2022-08-08
Attending: INTERNAL MEDICINE
Payer: COMMERCIAL

## 2022-08-08 DIAGNOSIS — H52.13 MYOPIA OF BOTH EYES: ICD-10-CM

## 2022-08-08 DIAGNOSIS — E11.9 TYPE 2 DIABETES MELLITUS WITHOUT COMPLICATION, WITHOUT LONG-TERM CURRENT USE OF INSULIN (H): ICD-10-CM

## 2022-08-08 DIAGNOSIS — Z01.00 DIABETIC EYE EXAM (H): Primary | ICD-10-CM

## 2022-08-08 DIAGNOSIS — H35.362 MACULAR DRUSEN, LEFT: ICD-10-CM

## 2022-08-08 DIAGNOSIS — E11.9 DIABETIC EYE EXAM (H): Primary | ICD-10-CM

## 2022-08-08 DIAGNOSIS — H25.13 NUCLEAR SENILE CATARACT OF BOTH EYES: ICD-10-CM

## 2022-08-08 DIAGNOSIS — H04.123 DRY EYE SYNDROME OF BOTH EYES: ICD-10-CM

## 2022-08-08 PROCEDURE — G0463 HOSPITAL OUTPT CLINIC VISIT: HCPCS | Mod: 25

## 2022-08-08 PROCEDURE — 92015 DETERMINE REFRACTIVE STATE: CPT

## 2022-08-08 PROCEDURE — 92004 COMPRE OPH EXAM NEW PT 1/>: CPT | Performed by: OPTOMETRIST

## 2022-08-08 ASSESSMENT — VISUAL ACUITY
CORRECTION_TYPE: GLASSES
OD_CC: 20/30-2
OS_PH_CC: 20/25-3
OS_CC: 20/50+
METHOD: SNELLEN - LINEAR

## 2022-08-08 ASSESSMENT — REFRACTION_MANIFEST
OD_ADD: +2.75
OD_SPHERE: -4.25
OS_ADD: +2.75
OS_AXIS: 058
OS_CYLINDER: +1.50
OS_SPHERE: -8.50
OD_CYLINDER: +0.75
OD_AXIS: 105

## 2022-08-08 ASSESSMENT — SLIT LAMP EXAM - LIDS
COMMENTS: DERMATOCHALASIS UPPER LID
COMMENTS: DERMATOCHALASIS UPPER LID

## 2022-08-08 ASSESSMENT — EXTERNAL EXAM - LEFT EYE: OS_EXAM: NORMAL

## 2022-08-08 ASSESSMENT — CUP TO DISC RATIO
OS_RATIO: 0.3
OD_RATIO: 0.4

## 2022-08-08 ASSESSMENT — CONF VISUAL FIELD
OS_NORMAL: 1
OD_NORMAL: 1
METHOD: COUNTING FINGERS

## 2022-08-08 ASSESSMENT — TONOMETRY
IOP_METHOD: TONOPEN
OD_IOP_MMHG: 13
OS_IOP_MMHG: 16

## 2022-08-08 ASSESSMENT — REFRACTION_WEARINGRX
OD_CYLINDER: +1.25
SPECS_TYPE: PAL
OS_ADD: +2.50
OS_SPHERE: -8.75
OS_AXIS: 064
OD_AXIS: 125
OD_SPHERE: -4.75
OD_ADD: +2.50
OS_CYLINDER: +1.50

## 2022-08-08 ASSESSMENT — EXTERNAL EXAM - RIGHT EYE: OD_EXAM: NORMAL

## 2022-09-12 ENCOUNTER — TELEPHONE (OUTPATIENT)
Dept: OPHTHALMOLOGY | Facility: CLINIC | Age: 62
End: 2022-09-12

## 2022-09-12 NOTE — TELEPHONE ENCOUNTER
[facility] Call Center    Phone Message    May a detailed message be left on voicemail: yes     Reason for Call: Appointment Intake    Referring Provider Name: N/A  Diagnosis and/or Symptoms: Pt is wanting to reschedule his tech appointment. Writer to send te due to protocol.    Action Taken: Message routed to:  [facility] eye    Travel Screening: Not Applicable

## 2022-09-12 NOTE — TELEPHONE ENCOUNTER
Called and spoke to Sekou     Made him an appointment for 9/29 @ 3 pm for a tech appointment     Nidhi Hernandez Communication Facilitator on 9/12/2022 at 4:24 PM

## 2022-09-19 ENCOUNTER — TELEPHONE (OUTPATIENT)
Dept: ENDOCRINOLOGY | Facility: CLINIC | Age: 62
End: 2022-09-19

## 2022-09-19 NOTE — TELEPHONE ENCOUNTER
----- Message from Cheri Watson MD sent at 8/4/2022  9:50 AM CDT -----  How is pt doing on the rosuvstatin?

## 2022-09-19 NOTE — TELEPHONE ENCOUNTER
He tells me he is doing fine on the rosuvstatin with no side affects whatsoever. He does ave a eye appointment 9/29/22 for images to be taken  that he wanted you to know about Nissa Nazario RN on 9/19/2022 at 8:42 AM

## 2022-10-03 ENCOUNTER — ALLIED HEALTH/NURSE VISIT (OUTPATIENT)
Dept: OPHTHALMOLOGY | Facility: CLINIC | Age: 62
End: 2022-10-03
Payer: COMMERCIAL

## 2022-10-03 ENCOUNTER — TELEPHONE (OUTPATIENT)
Dept: OPHTHALMOLOGY | Facility: CLINIC | Age: 62
End: 2022-10-03

## 2022-10-03 DIAGNOSIS — E11.9 DIABETIC EYE EXAM (H): Primary | ICD-10-CM

## 2022-10-03 DIAGNOSIS — Z01.00 DIABETIC EYE EXAM (H): Primary | ICD-10-CM

## 2022-10-03 PROCEDURE — 99207 PR NO CHARGE COORDINATED CARE PS: CPT | Performed by: OPTOMETRIST

## 2022-10-03 PROCEDURE — 999N000103 HC STATISTIC NO CHARGE FACILITY FEE

## 2022-10-03 NOTE — PROGRESS NOTES
Tech only visit for macular OCT and fundus photos:       #Macular Drusen and Mild Pigmentary changes, left eye.     Former smoker - for 27 years. Has not smoked for the past 12 years.     Drusen size - borderline AREDS criteria. Will monitor for now.     Recommend UV protection.    Recommend fish and green leafy vegetables 3 days per week.     Macular OCT 10/03/2022: Right eye: normal; left eye: mild outter retinal changes and depression.     Recommend follow up with retina service. I tried calling the patient but no answer. Will send a note to triage to schedule follow up in the retina.

## 2022-10-03 NOTE — NURSING NOTE
Chief Complaints and History of Present Illnesses   Patient presents with     Follow Up     Chief Complaint(s) and History of Present Illness(es)     Follow Up     Laterality: both eyes              Comments     Here for tech only imaging.     Sebastian Le COT 2:19 PM October 3, 2022

## 2022-10-04 ENCOUNTER — TELEPHONE (OUTPATIENT)
Dept: OPHTHALMOLOGY | Facility: CLINIC | Age: 62
End: 2022-10-04

## 2022-10-06 ENCOUNTER — TELEPHONE (OUTPATIENT)
Dept: OPHTHALMOLOGY | Facility: CLINIC | Age: 62
End: 2022-10-06

## 2022-10-07 ENCOUNTER — TELEPHONE (OUTPATIENT)
Dept: OPHTHALMOLOGY | Facility: CLINIC | Age: 62
End: 2022-10-07

## 2022-10-07 NOTE — TELEPHONE ENCOUNTER
Tech only visit for macular OCT and fundus photos:       #Macular Drusen and Mild Pigmentary changes, left eye.     Former smoker - for 27 years. Has not smoked for the past 12 years.     Drusen size - borderline AREDS criteria. Will monitor for now.     Recommend UV protection.    Recommend fish and green leafy vegetables 3 days per week.     Macular OCT 10/03/2022: Right eye: normal; left eye: mild outter retinal changes and depression.     Recommend follow up with retina service. I tried calling the patient but no answer. Will send a note to triage to schedule follow up in the retina.        Addendum 10/07/2022: I called patient to discuss eye findings. He will follow up with one of the retina doctors.

## 2022-10-24 ENCOUNTER — OFFICE VISIT (OUTPATIENT)
Dept: OPHTHALMOLOGY | Facility: CLINIC | Age: 62
End: 2022-10-24
Attending: OPHTHALMOLOGY
Payer: COMMERCIAL

## 2022-10-24 DIAGNOSIS — E11.9 TYPE 2 DIABETES MELLITUS WITHOUT COMPLICATION, WITHOUT LONG-TERM CURRENT USE OF INSULIN (H): Primary | ICD-10-CM

## 2022-10-24 DIAGNOSIS — H52.13 MYOPIA OF BOTH EYES: ICD-10-CM

## 2022-10-24 DIAGNOSIS — H25.13 NUCLEAR SENILE CATARACT OF BOTH EYES: ICD-10-CM

## 2022-10-24 DIAGNOSIS — H35.362 MACULAR DRUSEN, LEFT: ICD-10-CM

## 2022-10-24 PROCEDURE — 99203 OFFICE O/P NEW LOW 30 MIN: CPT | Mod: GC | Performed by: OPHTHALMOLOGY

## 2022-10-24 PROCEDURE — G0463 HOSPITAL OUTPT CLINIC VISIT: HCPCS | Mod: 25

## 2022-10-24 ASSESSMENT — VISUAL ACUITY
METHOD: SNELLEN - LINEAR
OS_CC: 20/25
OS_CC+: -2
CORRECTION_TYPE: GLASSES
OD_CC: 20/30
OD_CC+: -2

## 2022-10-24 ASSESSMENT — CONF VISUAL FIELD
OD_SUPERIOR_TEMPORAL_RESTRICTION: 0
OS_INFERIOR_TEMPORAL_RESTRICTION: 0
METHOD: COUNTING FINGERS
OD_SUPERIOR_NASAL_RESTRICTION: 0
OD_INFERIOR_TEMPORAL_RESTRICTION: 0
OS_NORMAL: 1
OD_NORMAL: 1
OS_SUPERIOR_TEMPORAL_RESTRICTION: 0
OS_SUPERIOR_NASAL_RESTRICTION: 0
OS_INFERIOR_NASAL_RESTRICTION: 0
OD_INFERIOR_NASAL_RESTRICTION: 0

## 2022-10-24 ASSESSMENT — TONOMETRY
OD_IOP_MMHG: 20
IOP_METHOD: TONOPEN
OS_IOP_MMHG: 18

## 2022-10-24 ASSESSMENT — REFRACTION_WEARINGRX
OS_SPHERE: -8.50
OD_CYLINDER: +1.00
OS_ADD: +2.75
OD_AXIS: 105
OD_ADD: +2.75
OD_SPHERE: -4.25
OS_AXIS: 058
OS_CYLINDER: +1.50

## 2022-10-24 ASSESSMENT — EXTERNAL EXAM - LEFT EYE: OS_EXAM: NORMAL

## 2022-10-24 ASSESSMENT — SLIT LAMP EXAM - LIDS
COMMENTS: DERMATOCHALASIS UPPER LID
COMMENTS: DERMATOCHALASIS UPPER LID

## 2022-10-24 ASSESSMENT — CUP TO DISC RATIO
OD_RATIO: 0.4
OS_RATIO: 0.3

## 2022-10-24 ASSESSMENT — EXTERNAL EXAM - RIGHT EYE: OD_EXAM: NORMAL

## 2022-10-24 NOTE — PROGRESS NOTES
I have confirmed the patient's and reviewed Past Medical History, Past Surgical History, Social History, Family History, Problem List, Medication List and agree with Tech note.        CC -   Diabetic follow-up, referred    INTERVAL HISTORY - Initial visit    [facility] -   Chema ANITA Mccullough is a  62 year old year-old patient with history of diabetes (Type 2 - 14 years). Last A1c 7.0%. Currently taking metformin and jardiance. Doing well. No flashes or floaters.       PAST OCULAR SURGERY  none    RETINAL IMAGING:  OCT 10/24/22   OD - normal, PHF attached, thin choroid  OS - normal, PHF attached, thin choroid      ASSESSMENT & PLAN    # diabetes without retinopathy  - recommend A1c < 7.0%  - Continue good BS control    # cataracts each eye   - monitor    # dermatochalasis each eye   - monitor     # high myopia  - monitor  - discussed risk of RD/RT     return to clinic: 1 year VTD Optos Mac OCT    Misty Barbosa MD  Resident Physician, PGY-3  Department of Ophthalmology  10/24/22 2:48 PM    Attestation:  I have seen and examined the patient with Dr. Misty Barbosa MD and agree with the findings in this note, as well as the interpretations of the diagnostic tests.      Marcin Nieves MD PhD.  Professor & Chair

## 2022-10-24 NOTE — NURSING NOTE
Chief Complaints and History of Present Illnesses   Patient presents with     Retinal Evaluation     Chief Complaint(s) and History of Present Illness(es)     Retinal Evaluation           Comments    Patient states that he was referred by  to get his retinas looked at. No blurry vision. No flashes of light. No floaters. No pain and irritation. No distortion.     Ocular Meds:none   BS: does not check everyday   Lab Results       Component                Value               Date                       A1C                      7.0                 07/18/2022                 A1C                      7.1                 03/02/2022                 A1C                      6.7                 08/09/2021                 A1C                      6.8                 07/07/2020                 A1C                      8.3                 06/07/2013                 A1C                      7.5                 12/23/2011              Shaheen BEAVERS, October 24, 2022 2:29 PM

## 2022-12-01 DIAGNOSIS — E11.9 TYPE 2 DIABETES MELLITUS WITHOUT COMPLICATION, WITHOUT LONG-TERM CURRENT USE OF INSULIN (H): ICD-10-CM

## 2022-12-06 RX ORDER — ASPIRIN 81 MG/1
TABLET, COATED ORAL
Qty: 90 TABLET | Refills: 3 | Status: ON HOLD | OUTPATIENT
Start: 2022-12-06 | End: 2023-05-03

## 2022-12-06 NOTE — TELEPHONE ENCOUNTER
aspirin (ASA) 81 MG EC tablet  Last Written Prescription Date:  1/28/22  Last Fill Quantity: 90,   # refills: 3  Last Office Visit : 7/29/22  Future Office visit: 2/3/22    Routing refill request to provider for review/approval because: not on protocol

## 2023-01-19 DIAGNOSIS — H35.362 MACULAR DRUSEN, LEFT: Primary | ICD-10-CM

## 2023-02-01 ENCOUNTER — TELEPHONE (OUTPATIENT)
Dept: ENDOCRINOLOGY | Facility: CLINIC | Age: 63
End: 2023-02-01
Payer: COMMERCIAL

## 2023-02-10 ENCOUNTER — VIRTUAL VISIT (OUTPATIENT)
Dept: ENDOCRINOLOGY | Facility: CLINIC | Age: 63
End: 2023-02-10
Payer: COMMERCIAL

## 2023-02-10 DIAGNOSIS — R47.89 WORD FINDING DIFFICULTY: ICD-10-CM

## 2023-02-10 DIAGNOSIS — E11.9 TYPE 2 DIABETES MELLITUS WITHOUT COMPLICATION, WITHOUT LONG-TERM CURRENT USE OF INSULIN (H): ICD-10-CM

## 2023-02-10 DIAGNOSIS — D64.9 ANEMIA, UNSPECIFIED TYPE: Primary | ICD-10-CM

## 2023-02-10 DIAGNOSIS — E04.9 GOITER: ICD-10-CM

## 2023-02-10 PROCEDURE — 99215 OFFICE O/P EST HI 40 MIN: CPT | Mod: VID | Performed by: INTERNAL MEDICINE

## 2023-02-10 NOTE — PROGRESS NOTES
Outcome for 02/10/23 9:02 AM: Patient is not checking blood sugars  Alba Falcon LPN     Patient is showing 4/5 MNCM met. BP out range (no recent BP)  Alba Falcon LPN      Video-Visit Details    Type of service:  Video Visit    Virtual visit conducted by Parish.      Originating Location (pt. Location): OFFICE    Distant Location (provider location):  Off site    Mode of Communication:  Video Conference via [employer]    Physician has received verbal consent for a Video Visit from the patient? YES    Start time 1205, stop time 1230, chart review, documentation time, coordination of care, 15 minutes.  Total time spent day of encounter 40 minutes.    [facility]  Endocrinology  Cheri Watson MD  2/10/23    Chief Complaint:   Diabetes    History of Present Illness:   Chema Mccullough is a 62 year old male with a history of type 2 diabetes and goiter who presents for follow up of diabetes.    #1 Diabetes mellitus type 2  The patient was diagnosed with prediabetes in 2004 and then in 2009 found to have fasting blood sugar of 184 with A1c of 7.5%.  He was started on Metformin XR in February 2009, dose increased to 500 mg twice daily in March 2013.  A1c from 2013 - 2016 was consistently in the 8% range.  November 2015 evaluation significant for detectable C-peptide of 3.2.  June 2016 his Metformin was increased to 2000 mg daily.  December 2017 he was started on Amaryl 1 mg daily as he was experiencing postprandial hyperglycemia. A1c between April 2018-October 2018 consistently in 6% range. August 2018 PSA was 0.8, creatinine was 0.75, hemoglobin A1c of 6.6, urine for microalbumin was negative, and LDL was 78.  August 2019 hemoglobin A1c was 6.9.  During his previous visit on 8/16/19 SGLT-2 inhibitors and GLP-1 agonists were discussed, but the patient was hesitant to start them as they were new.  August 2019 hemoglobin A1c of 6.9.  February 2020 hemoglobin A1c is 7.1.    In February 2020, I had the patient try  Jardiance at 10 mg daily and stop his glimepiride.   July 2020 hemoglobin A1c is 6.8, LDL 68, TSH 0.85.December 2020 urine for protein was negative, creatinine was 0.82, hemoglobin A1c of 6.9, LDL was 80.   August 2021 LDL was 73, August 2021 when hemoglobin A1c 6.7, TSH 0.73  December 2021 hemoglobin A1c at 7.4, March 2022 hemoglobin A1c of 7.1, July 2022 hemoglobin A1c at 7.0.      Interval history: The patient remains on Jardiance at 10 mg daily and Metformin at 1000 mg twice daily.   February 2023 hemoglobin A1c at 7.6.  Potassium 4.4, creatinine 0.79, LDL was 101, HDL was 41, triglycerides 98    Diabetes monitoring and complications:  CAD: No  Last eye exam results: October 2022 eye exam unremarkable  Microalbuminuria: Negative in July 2022  Neuropathy: No  HTN: No  On Statin: Yes  On Aspirin: Yes  Depression: No  Erectile dysfunction: No    #2 Hyperlipidemia  Stress test in 2016 was unremarkable. December 2017 LDL was 44. At that time he was taking Lipitor 40 mg daily. August 2019 LDL was 78.  August 2021 LDL was 73.    Interval History  Now on rosuvastatin at 10 mg daily.. Feb 2023 LDL at 101.  Per patient report, his primary was hoping to increase his rosuvastatin although he is very reluctant to consider this.    #3 Goiter  June 2016, he was noted to have a multinodular goiter with several nodules roughly 1 cm in size. October 2016 formal neck ultrasound showed multinodular goiter, with  largest nodules on the right side at 1.1 cm in size (two nodules) and the largest nodule on the left side at 1.2 cm in size.  The patient had declined ultrasound-guided FNA in October 2016. Eval with floor ultrasound in April 2017 showed the following: Right anterior nodule 1.0 x 0.6 x 0.6 cm in size, right posterior/inferior nodule at 1.0 x 0.5 x 1.0 cm in size, left inferior nodule at 0.6 x 0.5 x 0.7 cm in size. April 2018 US showed no interval change in thyroid nodules. Neck ultrasound performed in August 2020 continues  to show small nodules bilaterally which are unchanged in size. Repeat neck ultrasound in April 2021 showed multiple nodules bilaterally, the largest about 1.1 cm in size-none met criteria for biopsy with no change compared with the previous exam in 2020.    Interval History  Some word finding difficulty -see below. February 2023 TSH was 0.55,       #4 iron deficiency anemia  Noted on blood draw in February 2023 with noted low iron at 50, Ferritin low at 8, hemoglobin 12.5.  Of note, the patient reports a history of colonoscopy in 2019 which showed some diverticulosis.    #5 Word finding difficulty  The patient's wife has noted patient has difficulty finding words to explain his thoughts.  The patient reports a history of cognitive decline in his mother when she was in her 90s.      Review of Systems:   Pertinent items are noted in HPI.  All other systems are negative.    Active Medications:     Current Outpatient Medications:      ASPIRIN LOW DOSE 81 MG EC tablet, TAKE 1 TABLET BY MOUTH EVERY DAY, Disp: 90 tablet, Rfl: 3     empagliflozin (JARDIANCE) 10 MG TABS tablet, Take 1 tablet (10 mg) by mouth daily, Disp: 90 tablet, Rfl: 4     MAGNESIUM PO, Pt. Unsure of dosage, Disp: , Rfl:      metFORMIN (GLUCOPHAGE) 1000 MG tablet, Take 1 tablet (1,000 mg) by mouth 2 times daily (with meals), Disp: 180 tablet, Rfl: 3     Omega-3 Fatty Acids (OMEGA 3 PO), Take by mouth daily, Disp: , Rfl:      rosuvastatin (CRESTOR) 10 MG tablet, Take 1 tablet (10 mg) by mouth daily, Disp: 90 tablet, Rfl: 3    Current Facility-Administered Medications:      lidocaine 1 % injection 2 mL, 2 mL, INTRA-ARTICULAR, Once, Camila Baeza MD      Allergies:   Patient has no known allergies.      Past Medical History:  Type 2 diabetes mellitus  Goiter  Coronary arteriosclerosis  Hyperlipidemia  Obesity  Colon adenoma     Past Surgical History:  No past surgical history on file.    Family History:   Diabetes: Mother, father      Social History:  "  Social History     Tobacco Use     Smoking status: Former     Packs/day: 1.00     Types: Cigarettes, Cigars     Start date: 10/10/1978     Quit date: 2011     Years since quittin.1     Smokeless tobacco: Former   Substance Use Topics     Alcohol use: No     Drug use: No        Physical Exam:   GENERAL: Healthy, alert and no distress\",\"EYES: Eyes grossly normal to inspection.  No discharge or erythema, or obvious scleral/conjunctival abnormalities.\",\"RESP: No audible wheeze, cough, or visible cyanosis.  No visible retractions or increased work of breathing.  \",\"SKIN: Visible skin clear. No significant rash, abnormal pigmentation or lesions.\",\"NEURO: Cranial nerves grossly intact.  Mentation and speech appropriate for age, although the patient does seem to have difficulty noting the precise words to explain his thoughts..\",\"PSYCH: Mentation appears normal, affect normal/bright, judgement and insight intact, normal speech and appearance well-groomed.       Data:  No visits with results within 1 Week(s) from this visit.   Latest known visit with results is:   Lab on 2022   Component Date Value Ref Range Status     25 OH Vitamin D2 2022 <5  ug/L Final     25 OH Vitamin D3 2022 31  ug/L Final     25 OH Vit D Total 2022 <36  20 - 75 ug/L Final    Season, race, dietary intake, and treatment affect the concentration of 25-hydroxy-Vitamin D. Values may decrease during winter months and increase during summer months. Values 20-29 ug/L may indicate Vitamin D insufficiency and values <20 ug/L may indicate Vitamin D deficiency.     TSH 2022 0.71  0.40 - 4.00 mU/L Final     Cholesterol 2022 130  <200 mg/dL Final     Triglycerides 2022 75  <150 mg/dL Final     Direct Measure HDL 2022 42  >=40 mg/dL Final     LDL Cholesterol Calculated 2022 73  <=100 mg/dL Final     Non HDL Cholesterol 2022 88  <130 mg/dL Final     Patient Fasting > 8hrs? 2022 Yes   Final "     Creatinine Urine mg/dL 07/18/2022 52  mg/dL Final     Albumin Urine mg/L 07/18/2022 5  mg/L Final     Albumin Urine mg/g Cr 07/18/2022 9.62  0.00 - 17.00 mg/g Cr Final     Sodium 07/18/2022 133  133 - 144 mmol/L Final     Potassium 07/18/2022 4.0  3.4 - 5.3 mmol/L Final     Chloride 07/18/2022 108  94 - 109 mmol/L Final     Carbon Dioxide (CO2) 07/18/2022 26  20 - 32 mmol/L Final     Anion Gap 07/18/2022 <1 (A) 3 - 14 mmol/L Final     Urea Nitrogen 07/18/2022 12  7 - 30 mg/dL Final     Creatinine 07/18/2022 0.74  0.66 - 1.25 mg/dL Final     Calcium 07/18/2022 9.5  8.5 - 10.1 mg/dL Final     Glucose 07/18/2022 138 (A) 70 - 99 mg/dL Final     GFR Estimate 07/18/2022 >90  >60 mL/min/1.73m2 Final    Effective December 21, 2021 eGFRcr in adults is calculated using the 2021 CKD-EPI creatinine equation which includes age and gender (Alicia et al., NEJ, DOI: 10.1056/RFQOsx8226011)     Hemoglobin A1C 07/18/2022 7.0 (A) 0.0 - 5.6 % Final    Normal <5.7%   Prediabetes 5.7-6.4%    Diabetes 6.5% or higher     Note: Adopted from ADA consensus guidelines.     February 2023 vitamin B12 was 507.  February 2023 hemoglobin A1c at 7.6, Ferritin low at 8, potassium 4.4, creatinine 0.79, LDL was 101, HDL was 41, triglycerides 98, TSH 0.55    Assessment and Plan:  #1 Diabetes mellitus type 2  February 2023 hemoglobin A1c higher at 7.6.  Discussed with patient options.  I think the best option would be for the patient to increase his Jardiance.  However the patient would really like to try 3 more months of dietary lifestyle changes before increase the Jardiance.  As a result, we will have the patient to continue on Jardiance at 10 mg daily.  Patient to continue with Metformin at 1000 mg twice daily.     #2 Hyperlipidemia  Patient now on Crestor 10 mg daily.  February 2023 LDL reasonable at 101.  That being said, I think he could potentially benefit from increasing his rosuvastatin.  That being said, he is very reluctant to increase his  rosuvastatin at this time and would prefer to have this rechecked.    #3 Goiter  February 2023 TSH was normal.  We will plan for thyroid ultrasound for follow-up.    #4 iron deficiency anemia  Noted iron deficiency anemia in February 2022 blood draws exemplified by a mild anemia with low ferritin.  Patient has history of diverticulosis.  We will plan for GI evaluation.  Referral made.    #5 Word finding difficulty  Uncertain significance.  Best assessed formally.  Patient and wife are agreeable..  Very 2023 B12 was normal.  We will plan for head MRI and neurology consultation.    Return visit in 3 to 4 months with labs noted below done prior to return visit.    Orders Placed This Encounter   Procedures     US Thyroid     MR Brain w/o & w Contrast     Hemoglobin A1c     Lipid Profile     TSH     Ferritin     CBC with platelets     Albumin Random Urine Quantitative with Creat Ratio     Basic metabolic panel     Vitamin B12     Adult GI  Referral - Consult Only     Adult Neurology  Referral

## 2023-02-10 NOTE — LETTER
2/10/2023       RE: Chema Mccullough  [address] [place] [address]     Dear Colleague,    Thank you for referring your patient, Chema Mccullough, to the [facility] ENDOCRINOLOGY [facility] [place] at [facility]. Please see a copy of my visit note below.    Outcome for 02/10/23 9:02 AM: Patient is not checking blood sugars  Alba Falcon LPN     Patient is showing 4/5 MNCM met. BP out range (no recent BP)  Alba Falcon LPN      Video-Visit Details    Type of service:  Video Visit    Virtual visit conducted by Parish.      Originating Location (pt. Location): OFFICE    Distant Location (provider location):  Off site    Mode of Communication:  Video Conference via Doctor kinetic    Physician has received verbal consent for a Video Visit from the patient? YES    Start time 1205, stop time 1230, chart review, documentation time, coordination of care, 15 minutes.  Total time spent day of encounter 40 minutes.    [facility]  Endocrinology  Cheri Watson MD  2/10/23    Chief Complaint:   Diabetes    History of Present Illness:   Chema Mccullough is a 62 year old male with a history of type 2 diabetes and goiter who presents for follow up of diabetes.    #1 Diabetes mellitus type 2  The patient was diagnosed with prediabetes in 2004 and then in 2009 found to have fasting blood sugar of 184 with A1c of 7.5%.  He was started on Metformin XR in February 2009, dose increased to 500 mg twice daily in March 2013.  A1c from 2013 - 2016 was consistently in the 8% range.  November 2015 evaluation significant for detectable C-peptide of 3.2.  June 2016 his Metformin was increased to 2000 mg daily.  December 2017 he was started on Amaryl 1 mg daily as he was experiencing postprandial hyperglycemia. A1c between April 2018-October 2018 consistently in 6% range. August 2018 PSA was 0.8, creatinine was 0.75, hemoglobin A1c of 6.6, urine for microalbumin was  negative, and LDL was 78.  August 2019 hemoglobin A1c was 6.9.  During his previous visit on 8/16/19 SGLT-2 inhibitors and GLP-1 agonists were discussed, but the patient was hesitant to start them as they were new.  August 2019 hemoglobin A1c of 6.9.  February 2020 hemoglobin A1c is 7.1.    In February 2020, I had the patient try Jardiance at 10 mg daily and stop his glimepiride.   July 2020 hemoglobin A1c is 6.8, LDL 68, TSH 0.85.December 2020 urine for protein was negative, creatinine was 0.82, hemoglobin A1c of 6.9, LDL was 80.   August 2021 LDL was 73, August 2021 when hemoglobin A1c 6.7, TSH 0.73  December 2021 hemoglobin A1c at 7.4, March 2022 hemoglobin A1c of 7.1, July 2022 hemoglobin A1c at 7.0.      Interval history: The patient remains on Jardiance at 10 mg daily and Metformin at 1000 mg twice daily.   February 2023 hemoglobin A1c at 7.6.  Potassium 4.4, creatinine 0.79, LDL was 101, HDL was 41, triglycerides 98    Diabetes monitoring and complications:  CAD: No  Last eye exam results: October 2022 eye exam unremarkable  Microalbuminuria: Negative in July 2022  Neuropathy: No  HTN: No  On Statin: Yes  On Aspirin: Yes  Depression: No  Erectile dysfunction: No    #2 Hyperlipidemia  Stress test in 2016 was unremarkable. December 2017 LDL was 44. At that time he was taking Lipitor 40 mg daily. August 2019 LDL was 78.  August 2021 LDL was 73.    Interval History  Now on rosuvastatin at 10 mg daily.. Feb 2023 LDL at 101.  Per patient report, his primary was hoping to increase his rosuvastatin although he is very reluctant to consider this.    #3 Goiter  June 2016, he was noted to have a multinodular goiter with several nodules roughly 1 cm in size. October 2016 formal neck ultrasound showed multinodular goiter, with  largest nodules on the right side at 1.1 cm in size (two nodules) and the largest nodule on the left side at 1.2 cm in size.  The patient had declined ultrasound-guided FNA in October 2016. Malgorzata  with floor ultrasound in April 2017 showed the following: Right anterior nodule 1.0 x 0.6 x 0.6 cm in size, right posterior/inferior nodule at 1.0 x 0.5 x 1.0 cm in size, left inferior nodule at 0.6 x 0.5 x 0.7 cm in size. April 2018 US showed no interval change in thyroid nodules. Neck ultrasound performed in August 2020 continues to show small nodules bilaterally which are unchanged in size. Repeat neck ultrasound in April 2021 showed multiple nodules bilaterally, the largest about 1.1 cm in size-none met criteria for biopsy with no change compared with the previous exam in 2020.    Interval History  Some word finding difficulty -see below. February 2023 TSH was 0.55,       #4 iron deficiency anemia  Noted on blood draw in February 2023 with noted low iron at 50, Ferritin low at 8, hemoglobin 12.5.  Of note, the patient reports a history of colonoscopy in 2019 which showed some diverticulosis.    #5 Word finding difficulty  The patient's wife has noted patient has difficulty finding words to explain his thoughts.  The patient reports a history of cognitive decline in his mother when she was in her 90s.      Review of Systems:   Pertinent items are noted in HPI.  All other systems are negative.    Active Medications:     Current Outpatient Medications:      ASPIRIN LOW DOSE 81 MG EC tablet, TAKE 1 TABLET BY MOUTH EVERY DAY, Disp: 90 tablet, Rfl: 3     empagliflozin (JARDIANCE) 10 MG TABS tablet, Take 1 tablet (10 mg) by mouth daily, Disp: 90 tablet, Rfl: 4     MAGNESIUM PO, Pt. Unsure of dosage, Disp: , Rfl:      metFORMIN (GLUCOPHAGE) 1000 MG tablet, Take 1 tablet (1,000 mg) by mouth 2 times daily (with meals), Disp: 180 tablet, Rfl: 3     Omega-3 Fatty Acids (OMEGA 3 PO), Take by mouth daily, Disp: , Rfl:      rosuvastatin (CRESTOR) 10 MG tablet, Take 1 tablet (10 mg) by mouth daily, Disp: 90 tablet, Rfl: 3    Current Facility-Administered Medications:      lidocaine 1 % injection 2 mL, 2 mL, INTRA-ARTICULAR,  "Once, Camila Baeza MD      Allergies:   Patient has no known allergies.      Past Medical History:  Type 2 diabetes mellitus  Goiter  Coronary arteriosclerosis  Hyperlipidemia  Obesity  Colon adenoma     Past Surgical History:  No past surgical history on file.    Family History:   Diabetes: Mother, father      Social History:   Social History     Tobacco Use     Smoking status: Former     Packs/day: 1.00     Types: Cigarettes, Cigars     Start date: 10/10/1978     Quit date: 2011     Years since quittin.1     Smokeless tobacco: Former   Substance Use Topics     Alcohol use: No     Drug use: No        Physical Exam:   GENERAL: Healthy, alert and no distress\",\"EYES: Eyes grossly normal to inspection.  No discharge or erythema, or obvious scleral/conjunctival abnormalities.\",\"RESP: No audible wheeze, cough, or visible cyanosis.  No visible retractions or increased work of breathing.  \",\"SKIN: Visible skin clear. No significant rash, abnormal pigmentation or lesions.\",\"NEURO: Cranial nerves grossly intact.  Mentation and speech appropriate for age, although the patient does seem to have difficulty noting the precise words to explain his thoughts..\",\"PSYCH: Mentation appears normal, affect normal/bright, judgement and insight intact, normal speech and appearance well-groomed.       Data:  No visits with results within 1 Week(s) from this visit.   Latest known visit with results is:   Lab on 2022   Component Date Value Ref Range Status     25 OH Vitamin D2 2022 <5  ug/L Final     25 OH Vitamin D3 2022 31  ug/L Final     25 OH Vit D Total 2022 <36  20 - 75 ug/L Final    Season, race, dietary intake, and treatment affect the concentration of 25-hydroxy-Vitamin D. Values may decrease during winter months and increase during summer months. Values 20-29 ug/L may indicate Vitamin D insufficiency and values <20 ug/L may indicate Vitamin D deficiency.     TSH 2022 0.71  0.40 - 4.00 " mU/L Final     Cholesterol 07/18/2022 130  <200 mg/dL Final     Triglycerides 07/18/2022 75  <150 mg/dL Final     Direct Measure HDL 07/18/2022 42  >=40 mg/dL Final     LDL Cholesterol Calculated 07/18/2022 73  <=100 mg/dL Final     Non HDL Cholesterol 07/18/2022 88  <130 mg/dL Final     Patient Fasting > 8hrs? 07/18/2022 Yes   Final     Creatinine Urine mg/dL 07/18/2022 52  mg/dL Final     Albumin Urine mg/L 07/18/2022 5  mg/L Final     Albumin Urine mg/g Cr 07/18/2022 9.62  0.00 - 17.00 mg/g Cr Final     Sodium 07/18/2022 133  133 - 144 mmol/L Final     Potassium 07/18/2022 4.0  3.4 - 5.3 mmol/L Final     Chloride 07/18/2022 108  94 - 109 mmol/L Final     Carbon Dioxide (CO2) 07/18/2022 26  20 - 32 mmol/L Final     Anion Gap 07/18/2022 <1 (A) 3 - 14 mmol/L Final     Urea Nitrogen 07/18/2022 12  7 - 30 mg/dL Final     Creatinine 07/18/2022 0.74  0.66 - 1.25 mg/dL Final     Calcium 07/18/2022 9.5  8.5 - 10.1 mg/dL Final     Glucose 07/18/2022 138 (A) 70 - 99 mg/dL Final     GFR Estimate 07/18/2022 >90  >60 mL/min/1.73m2 Final    Effective December 21, 2021 eGFRcr in adults is calculated using the 2021 CKD-EPI creatinine equation which includes age and gender (Alicia et al., NEJ, DOI: 10.1056/YXAVye0956678)     Hemoglobin A1C 07/18/2022 7.0 (A) 0.0 - 5.6 % Final    Normal <5.7%   Prediabetes 5.7-6.4%    Diabetes 6.5% or higher     Note: Adopted from ADA consensus guidelines.     February 2023 vitamin B12 was 507.  February 2023 hemoglobin A1c at 7.6, Ferritin low at 8, potassium 4.4, creatinine 0.79, LDL was 101, HDL was 41, triglycerides 98, TSH 0.55    Assessment and Plan:  #1 Diabetes mellitus type 2  February 2023 hemoglobin A1c higher at 7.6.  Discussed with patient options.  I think the best option would be for the patient to increase his Jardiance.  However the patient would really like to try 3 more months of dietary lifestyle changes before increase the Jardiance.  As a result, we will have the patient to  continue on Jardiance at 10 mg daily.  Patient to continue with Metformin at 1000 mg twice daily.     #2 Hyperlipidemia  Patient now on Crestor 10 mg daily.  February 2023 LDL reasonable at 101.  That being said, I think he could potentially benefit from increasing his rosuvastatin.  That being said, he is very reluctant to increase his rosuvastatin at this time and would prefer to have this rechecked.    #3 Goiter  February 2023 TSH was normal.  We will plan for thyroid ultrasound for follow-up.    #4 iron deficiency anemia  Noted iron deficiency anemia in February 2022 blood draws exemplified by a mild anemia with low ferritin.  Patient has history of diverticulosis.  We will plan for GI evaluation.  Referral made.    #5 Word finding difficulty  Uncertain significance.  Best assessed formally.  Patient and wife are agreeable..  Very 2023 B12 was normal.  We will plan for head MRI and neurology consultation.    Return visit in 3 to 4 months with labs noted below done prior to return visit.    Orders Placed This Encounter   Procedures     US Thyroid     MR Brain w/o & w Contrast     Hemoglobin A1c     Lipid Profile     TSH     Ferritin     CBC with platelets     Albumin Random Urine Quantitative with Creat Ratio     Basic metabolic panel     Vitamin B12     Adult GI  Referral - Consult Only     Adult Neurology  Referral       Again, thank you for allowing me to participate in the care of your patient.      Sincerely,    Cheri Watson MD

## 2023-02-10 NOTE — NURSING NOTE
"Patient REFUSED individual allergy and medication review by support staff. Stated that he didn't have to because it's \"all over his chart and Cheri Watson knows my information, I do not need to verify it.\"    Is the patient currently in the state of [place]? YES    Visit mode:VIDEO    If the visit is dropped, the patient can be reconnected by: VIDEO VISIT: Text to cell phone: [phone]    Will anyone else be joining the visit? NO      How would you like to obtain your AVS? MyChart    Are changes needed to the allergy or medication list? NO    Comments or concerns regarding today's visit: none            "

## 2023-02-13 ENCOUNTER — TELEPHONE (OUTPATIENT)
Dept: GASTROENTEROLOGY | Facility: CLINIC | Age: 63
End: 2023-02-13
Payer: COMMERCIAL

## 2023-02-13 NOTE — TELEPHONE ENCOUNTER
Pt is now rescheduled to see Jennifer Mclain on 2/22/2023 at 9am for a video visit.    Statement Selected

## 2023-02-13 NOTE — TELEPHONE ENCOUNTER
[facility] Call Center    Phone Message    May a detailed message be left on voicemail: yes     Reason for Call: Appointment Intake    Referring Provider Name: Cheri Watson MD  Diagnosis and/or Symptoms: Anemia, unspecified type [D64.9]    Per triage notes, patient is to be seen as a GI Emergent, but is currently scheduled on 03/07/2023 with Dr. Alexey Romano. Current appointment is outside requested time frame. Please review for further follow up per scheduling guidelines. Thanks!     Action Taken: Message routed to:  [facility] ([facility] GI    Travel Screening: Not Applicable

## 2023-02-20 ENCOUNTER — TELEPHONE (OUTPATIENT)
Dept: ENDOCRINOLOGY | Facility: CLINIC | Age: 63
End: 2023-02-20
Payer: COMMERCIAL

## 2023-02-20 NOTE — TELEPHONE ENCOUNTER
Per patient's wife's request, I faxed Dr. Watson's referral and MRI order to the [facility] to the number provided in [employer] message, [phone].  Cassie Hinojosa

## 2023-02-22 ENCOUNTER — VIRTUAL VISIT (OUTPATIENT)
Dept: GASTROENTEROLOGY | Facility: CLINIC | Age: 63
End: 2023-02-22
Attending: INTERNAL MEDICINE
Payer: COMMERCIAL

## 2023-02-22 VITALS — BODY MASS INDEX: 24.48 KG/M2 | HEIGHT: 70 IN | WEIGHT: 171 LBS

## 2023-02-22 DIAGNOSIS — D50.9 IRON DEFICIENCY ANEMIA, UNSPECIFIED IRON DEFICIENCY ANEMIA TYPE: Primary | ICD-10-CM

## 2023-02-22 PROCEDURE — 99204 OFFICE O/P NEW MOD 45 MIN: CPT | Mod: VID | Performed by: PHYSICIAN ASSISTANT

## 2023-02-22 ASSESSMENT — PAIN SCALES - GENERAL: PAINLEVEL: NO PAIN (0)

## 2023-02-22 NOTE — NURSING NOTE
Is the patient currently in the state of [place]? YES    Visit mode:VIDEO    If the visit is dropped, the patient can be reconnected by: VIDEO VISIT: Send to e-mail at: [email or web address]    Will anyone else be joining the visit? NO      How would you like to obtain your AVS? MyChart    Are changes needed to the allergy or medication list? NO    Reason for visit: New

## 2023-02-22 NOTE — PATIENT INSTRUCTIONS
It was a pleasure taking care of you today.  I've included a brief summary of our discussion and care plan from today's visit below.  Please review this information with your primary care provider.  _______________________________________________________________________     My recommendations are summarized as follows:     - Schedule a lab appointment for celiac screening tests   - schedule an upper endoscopy and colonoscopy. See scheduling information below     ______________________________________________________________________     How do I schedule labs, imaging studies, or procedures that were ordered in clinic today?      Labs: To schedule lab appointment you can contact your local [facility] or call [phone] to schedule at any convenient [facility] location.     Procedures: If a colonoscopy, upper endoscopy, breath test, esophageal manometry, or pH impedence was ordered today, our endoscopy team will call you to schedule this. If you have not heard from our endoscopy team within a week, please call [phone] to schedule.      Imaging Studies: If you were scheduled for a CT scan, X-ray, MRI, ultrasound, HIDA scan or other imaging study, please call [phone] to have this scheduled.      Referral: If a referral to another specialty was ordered, expect a phone call or follow instructions above. If you have not heard from anyone regarding your referral in a week, please call our clinic to check the status.      Who do I call with any questions after my visit?  Please be in touch if there are any further questions that arise following today's visit.  There are multiple ways to contact your gastroenterology care team.       During business hours, you may reach a Gastroenterology nurse at [phone]     To schedule or reschedule an appointment, please call [phone].      You can always send a secure message through [employer].  [employer] messages are answered by your nurse or doctor  typically within 24 hours.  Please allow extra time on weekends and holidays.       For urgent/emergent questions after business hours, you may reach the on-call GI Fellow by contacting the [facility]  at [phone].     How will I get the results of any tests ordered?    You will receive all of your results.  If you have signed up for [employer], any tests ordered at your visit will be available to you after your physician reviews them.  Typically this takes 1-2 weeks.  If there are urgent results that require a change in your care plan, your physician or nurse will call you to discuss the next steps.       What is [employer]?  [employer] is a secure way for you to access all of your healthcare records from the [facility].  It is a web based computer program, so you can sign on to it from any location.  It also allows you to send secure messages to your care team.  I recommend signing up for [employer] access if you have not already done so and are comfortable with using a computer.       How to I schedule a follow-up visit?  If you did not schedule a follow-up visit today, please call [phone] to schedule a follow-up office visit.      Jennifer Mclain PA-C  Division of Gastroenterology, Hepatology and Nutrition   [facility]

## 2023-02-22 NOTE — PROGRESS NOTES
GI NEW PATIENT VISIT     CC/REFERRING MD:    MD Cheri Amador MD     REASON FOR CONSULTATION:   Referred by Cheri Watson MD for anemia       HPI: Chema Mccullough is 62 year old male with pmhx of diabetes who presents for evaluation of iron def anemia. He was recently found to have a hemoglobin of 12.5, ferritin of 8 with iron of 50. He has hx of hemorrhoids, noted on prior colonoscopy in 2019 through [facility]. Reports having brbpr 2 weeks ago, nothing more recently. He otherwise denies blood in stools. No rectal pain. No abdominal pain. No n/v. No reflux symptoms. No dysphagia. He does have weight loss since 2019 but this is through increased exercise. His weight is stable at 170-175 lbs. No unintentional weight loss.     No diarrhea. Has constipation, usually going 3-4 days between BM's. He has been able to improve this through increasing water intake and now has a BM every other day.     He has been advised by endocrinologist to increase iron rich foods and start iron supplementation which he has done.     No family hx of colon cancer or other GI cancer.     Chema  has a past medical history of Diabetes (H).    He  has no past surgical history on file.    He  reports that he quit smoking about 12 years ago. His smoking use included cigarettes and cigars. He started smoking about 44 years ago. He smoked an average of 1 pack per day. He has quit using smokeless tobacco. He reports that he does not drink alcohol and does not use drugs.    His family history includes Diabetes in his father and mother.    ALLERGIES:  Patient has no known allergies.      PREVIOUS ENDOSCOPY:    Colonoscopy 11/2019   Impression: - The examined portion of the ileum was normal.  - Diverticulosis in the sigmoid colon.  - Internal hemorrhoids.  - The examination was otherwise normal.  - No specimens collected.  Recommendation: - Repeat colonoscopy in 5 years for   screening  purposes.      PERTINENT MEDICATIONS:    Current Outpatient Medications:      ASPIRIN LOW DOSE 81 MG EC tablet, TAKE 1 TABLET BY MOUTH EVERY DAY, Disp: 90 tablet, Rfl: 3     empagliflozin (JARDIANCE) 10 MG TABS tablet, Take 1 tablet (10 mg) by mouth daily, Disp: 90 tablet, Rfl: 4     metFORMIN (GLUCOPHAGE) 1000 MG tablet, Take 1 tablet (1,000 mg) by mouth 2 times daily (with meals), Disp: 180 tablet, Rfl: 3     rosuvastatin (CRESTOR) 10 MG tablet, Take 1 tablet (10 mg) by mouth daily, Disp: 90 tablet, Rfl: 3    Current Facility-Administered Medications:      lidocaine 1 % injection 2 mL, 2 mL, INTRA-ARTICULAR, Once, Camila Baeza MD    PHYSICAL EXAMINATION:  Constitutional: aaox3, cooperative, pleasant, not dyspneic/diaphoretic, no acute distress      GENERAL: Healthy, alert and no distress  EYES: Eyes grossly normal to inspection.  No discharge or erythema, or obvious scleral/conjunctival abnormalities.  RESP: No audible wheeze, cough, or visible cyanosis.  No visible retractions or increased work of breathing.    SKIN: Visible skin clear. No significant rash, abnormal pigmentation or lesions.  NEURO: Cranial nerves grossly intact.  Mentation and speech appropriate for age.  PSYCH: Mentation appears normal, affect normal/bright, judgement and insight intact, normal speech and appearance well-groomed.          PERTINENT STUDIES: (I personally reviewed these laboratory studies today)     Ref Range & Units 2/8/23   WBC 3.5 - 10.5 x10(9)/L 4.6    RBC 4.32 - 5.72 x10(12)/L 4.84    Hemoglobin 13.5 - 17.5 g/dL 12.5 Low     HCT 38.8 - 50.0 % 39.0    MCV 80.0 - 100.0 fL 80.6    MCH 27.6 - 33.3 pg 25.8 Low     MCHC 31.5 - 35.2 g/dL 32.1    RDW 11.9 - 15.5 % 13.7    Platelets 150 - 450 x10(9)/L 278    Neutrophil Absolute 1.7 - 7.0 10(9)/L 2.2    Lymphocyte Absolute 1.0 - 4.8 10(9)/L 1.8    Monocytes Absolute 0.2 - 0.9 10(9)/L 0.5    Eosinophil Absolute 0.0 - 0.5 10(9)/L 0.1    Basophil Absolute 0.0 - 0.3 10(9)/L 0.0     Immature Gran % 0.0 - 0.5 % 0.0           Ref Range & Units 2/8/23   Ferritin 22 - 275 ng/mL 8 Low          Ref Range & Units 2/8/23   Iron 65 - 175 mcg/dL 50 Low     Transferrin 174 - 364 mg/dL 342    TIBC, Calculated 240 - 450 mcg/dL 428    % Saturation, Calculated 10 - 50 % 12        Most recent creatinine:  Recent Labs   Lab Test 07/18/22  1214 03/02/22  0935   CR 0.74 0.84       TSH   Date Value Ref Range Status   07/18/2022 0.71 0.40 - 4.00 mU/L Final   07/07/2020 0.85 0.40 - 4.00 mU/L Final         ASSESSMENT/PLAN:    1. Iron deficiency anemia, unspecified iron deficiency anemia type  - Adult GI  Referral - Consult Only  - Tissue transglutaminase luis IgA and IgG; Future  - IgA; Future  - Adult GI  Referral - Procedure Only; Future        Chema Mccullough is a 62 year old male who presents for evaluation of iron def anemia. Noted on recent blood work to have decreased hgb of 12.5, ferritin of 8, iron of 50. Normal [id number] concerns of iron def anemia including concerns for chronic blood loss and/or decreased absorption. He does have hx of hemorrhoids and notes brbpr on occ but this is unlikely to explain iron def anemia. Recommended further eval with egd/colonoscopy and celiac screening tests. If initial work up negative, consider VCE as well. Explained rationale regarding testing. All questions and concerns were addressed.     Continue with iron supplementation and increasing iron rich foods.     Follow up after egd/colonoscopy.         Thank you for this consultation.  It was a pleasure to participate in the care of this patient; please contact us with any further questions.        This note was created with voice recognition software, and while reviewed for accuracy, typos may remain.       52 minutes spent on the date of the encounter doing chart review, history and exam, documentation and further activities per the note      Jennifer Mclain PA-C  Division of Gastroenterology,  Hepatology and Nutrition   [facility]            Video-Visit Details    Video Start Time: 8:57 AM    Type of service:  Video Visit    Video End Time:9:36 AM    Originating Location (pt. Location): Home ([place])     Distant Location (provider location):  Off-site    Platform used for Video Visit: Well

## 2023-02-24 ENCOUNTER — TELEPHONE (OUTPATIENT)
Dept: GASTROENTEROLOGY | Facility: CLINIC | Age: 63
End: 2023-02-24
Payer: COMMERCIAL

## 2023-02-24 ENCOUNTER — HOSPITAL ENCOUNTER (OUTPATIENT)
Facility: AMBULATORY SURGERY CENTER | Age: 63
End: 2023-02-24
Attending: INTERNAL MEDICINE
Payer: COMMERCIAL

## 2023-02-24 DIAGNOSIS — D50.9 ANEMIA, IRON DEFICIENCY: Primary | ICD-10-CM

## 2023-02-24 RX ORDER — BISACODYL 5 MG
TABLET, DELAYED RELEASE (ENTERIC COATED) ORAL
Qty: 4 TABLET | Refills: 0 | Status: SHIPPED | OUTPATIENT
Start: 2023-02-24 | End: 2023-08-03

## 2023-02-24 NOTE — TELEPHONE ENCOUNTER
----- Message from Chris Bro MD sent at 2/24/2023 10:31 AM CST -----  Regarding: RE: Overbook request for priority referral  I assume you are planning on putting this pt in the 8:45 slot? Yes - OK to add in that slot.    Can we just move the other morning cases each back 15 minutes? Then I can avoid having late starts for these pts and still have time for lunch.    MICHAEL Bro MD  Professor of Medicine  Division of Gastroenterology, Hepatology and Nutrition  [facility]      ----- Message -----  From: Sharla Gautam  Sent: 2/24/2023   9:36 AM CST  To: Chris Bro MD  Subject: Overbook request for priority referral           Dr Bro,    Would you be ok with a 15 minute overbook on 3/8/23 given the likelihood of cancellations? I'm trying to schedule this patient who needs an EGD/colonoscopy within the next 2 weeks and schedule is very full.     Thank you very much,  Sharla  Endoscopy Scheduling

## 2023-02-24 NOTE — TELEPHONE ENCOUNTER
"Screening Questions  BLUE  KIND OF PREP RED  LOCATION [review exclusion criteria] GREEN  SEDATION TYPE        Y Are you active on mychart?       ELISABETH Ordering/Referring Provider?        MEDICA MEDICARE What type of coverage do you have?      N Have you had a positive covid test in the last 14 days?     24.7 1. BMI  [BMI 40+ - review exclusion criteria]    Y  2. Are you able to give consent for your medical care? [IF NO,RN REVIEW]          N  3. Are you taking any prescription pain medications on a routine schedule   (ex narcotics: tramadol, oxycodone, roxicodone, oxycontin,  and percocet)?          3a. EXTENDED PREP What kind of prescription?     N 4. Do you have any chemical dependencies such as alcohol, street drugs, or methadone?        **If yes 3- 5 , please schedule with MAC sedation.**          IF YES TO ANY 6 - 10 - HOSPITAL SETTING ONLY.     N 6.   Do you need assistance transferring?     N 7.   Have you had a heart or lung transplant?    N 8.   Are you currently on dialysis?   N 9.   Do you use daily home oxygen?   N 10. Do you take nitroglycerin?   10a.  If yes, how often?     11. [FEMALES]   Are you currently pregnant?    11a.  If yes, how many weeks? [ Greater than 12 weeks, OR NEEDED]    N 12. Do you have Pulmonary Hypertension? *NEED PAC APPT AT [facility]*     N 13. [review exclusion criteria]  Do you have any implantable devices in your body (pacemaker, defib, LVAD)?    N 14. In the past 6 months, have you had any heart related issues including cardiomyopathy or heart attack?     14a.  If yes, did it require cardiac stenting if so when?     N 15. Have you had a stroke or Transient ischemic attack (TIA - aka  mini stroke ) within 6 months?      N 16. Do you have mod to severe Obstructive Sleep Apnea?  [Hospital only]    N 17. Do you have SEVERE AND UNCONTROLLED asthma? *NEED PAC APPT AT [facility]*     BB ASPIRIN 18. Are you currently taking any blood thinners?     18a. If yes, inform patient to \"follow up w/ " "ordering provider for bridging instructions.\"    N 19. Do you take the medication Phentermine?    19a. If yes, \"Hold for 7 days before procedure.  Please consult your prescribing provider if you have questions about holding this medication.\"     N  20. Do you have chronic kidney disease?      Y  21. Do you have a diagnosis of diabetes?     N  22. On a regular basis do you go 3-5 days between bowel movements?      23. Preferred LOCAL Pharmacy for Pre Prescription    [ LIST ONLY ONE PHARMACY]     [facility] [id number] [place] [facility] [id number] [address]    - CLOSING REMINDERS -    Informed patient they will need an adult    Cannot take any type of public or medical transportation alone    Conscious Sedation- Needs  for 6 hours after the procedure       MAC/General-Needs  for 24 hours after procedure    Pre-Procedure Covid test to be completed [[facility] PCR Testing Required]    Confirmed Nurse will call to complete assessment      MESSAGE SENT TO DR ZELAYA, REQUESTING 15 MIN. OVERBOOK ON 3/8 TO ACCOMMODATE BOTH PROCEDURES WITHIN REQUESTED TIMEFRAME.  SPOT ON HOLD WITH DR ZELAYA @[facility] ON 3/8.      WILL RESCHEDULE TO [facility] IF POSSIBLE.  - SCHEDULING DETAILS -  no Hospital Setting Required? If yes, what is the exclusion?:    DANELLE  Surgeon    3/1 & 3/8  Date of Procedure  Upper and Lower Endoscopy [EGD and Colonoscopy]  Type of Procedure Scheduled  [facility]-If you answer yes to questions #8, #20, #21Which Colonoscopy Prep was Sent?     CS Sedation Type     N PAC / Pre-op Required                 "

## 2023-02-24 NOTE — TELEPHONE ENCOUNTER
Caller: call to Chema Mccullough    Reason for Reschedule/Cancellation (please be detailed, any staff messages or encounters to note?): Dr Bro approved overbook for double on 3/8      Prior to reschedule please review:    Ordering Provider:ELISABETH    Sedation per order:MODERATE    Does patient have any ASC Exclusions, please identify?: NO      Notes on Cancelled Procedure:    Procedure:Upper Endoscopy [EGD]     Date: 3/1    Location:[facility]; [address], [place] [address]    Surgeon: DANELLE      Notes on Cancelled Procedure:    Procedure:Lower Endoscopy [Colonoscopy]     Date: 3/8    Location:[facility]; [address], [place] [address]    Surgeon: DANELLE       Rescheduled: Yes    Procedure: Upper and Lower Endoscopy [EGD and Colonoscopy]     Date: 3/8    Location:[facility]; [address], [address], [place] [address]    Surgeon: GARCIA    Sedation Level Scheduled  MODERATE,  Reason for Sedation Level PER ORDER/SCREENING    Prep/Instructions updated and sent: YES

## 2023-02-24 NOTE — TELEPHONE ENCOUNTER
"Pre assessment questions completed for upcoming Colonoscopy/Upper endoscopy (EGD) procedure scheduled on 3/8/23    COVID policy reviewed.     Pre-op exam? N/A    Reviewed procedural arrival time 0745, procedure time 0845 and facility location [facility]; [address], [address], [place] [address]    Designated  policy reviewed. Instructed to have someone stay 6 hours post procedure.     Anticoagulation/blood thinners? No    Electronic implanted devices? No    Diabetic? Yes - Patient to hold oral diabetic medications day of procedure    Procedure indication: Iron defi    Bowel prep recommendation: Standard Golytely d/t DM   Virtual visit on 2/22/23 noted \"Has constipation, usually going 3-4 days between BM's. He has been able to improve this through increasing water intake and now has a BM every other day.\"  Pt denied constipation, reports increasing water intake has increased frequency of bowel movements.    Pt declined to review procedure prep instructions. Instructed pt to review colonoscopy prep instructions at least 7 days prior to procedure. NPO instructions given.     Pt verbalized understanding and was instructed to call if any questions or concerns arise.     Prep instructions sent via [employer].      Bowel prep script sent to   [facility] [id number] [place] [facility] [id number] [address]     Patient verbalized understanding and had no questions or concerns at this time.      Alba Zuniga, ZULLY  Endoscopy Procedure Pre Assessment RN      "

## 2023-02-27 ENCOUNTER — TELEPHONE (OUTPATIENT)
Dept: ENDOCRINOLOGY | Facility: CLINIC | Age: 63
End: 2023-02-27
Payer: COMMERCIAL

## 2023-03-06 ENCOUNTER — TELEPHONE (OUTPATIENT)
Dept: ENDOCRINOLOGY | Facility: CLINIC | Age: 63
End: 2023-03-06
Payer: COMMERCIAL

## 2023-03-06 NOTE — TELEPHONE ENCOUNTER
New orders completed, signed and faxed to [facility] External order team. [phone]. Copy on file.   Ethel Kwong RN on 3/6/2023 at 2:19 PM

## 2023-03-08 ENCOUNTER — HOSPITAL ENCOUNTER (OUTPATIENT)
Facility: CLINIC | Age: 63
Discharge: HOME | End: 2023-03-08
Attending: INTERNAL MEDICINE | Admitting: INTERNAL MEDICINE
Payer: COMMERCIAL

## 2023-03-08 VITALS
HEART RATE: 68 BPM | HEIGHT: 70 IN | OXYGEN SATURATION: 95 % | BODY MASS INDEX: 24.62 KG/M2 | TEMPERATURE: 98.5 F | DIASTOLIC BLOOD PRESSURE: 89 MMHG | RESPIRATION RATE: 16 BRPM | WEIGHT: 172 LBS | SYSTOLIC BLOOD PRESSURE: 125 MMHG

## 2023-03-08 DIAGNOSIS — K25.3 ACUTE GASTRIC ULCER WITHOUT HEMORRHAGE OR PERFORATION: Primary | ICD-10-CM

## 2023-03-08 LAB
COLONOSCOPY: NORMAL
GLUCOSE BLDC GLUCOMTR-MCNC: 148 MG/DL (ref 70–99)

## 2023-03-08 PROCEDURE — 82962 GLUCOSE BLOOD TEST: CPT

## 2023-03-08 PROCEDURE — 43235 EGD DIAGNOSTIC BRUSH WASH: CPT | Performed by: INTERNAL MEDICINE

## 2023-03-08 PROCEDURE — 250N000009 HC RX 250: Performed by: INTERNAL MEDICINE

## 2023-03-08 PROCEDURE — 88342 IMHCHEM/IMCYTCHM 1ST ANTB: CPT | Mod: 26 | Performed by: PATHOLOGY

## 2023-03-08 PROCEDURE — 88305 TISSUE EXAM BY PATHOLOGIST: CPT | Mod: TC | Performed by: INTERNAL MEDICINE

## 2023-03-08 PROCEDURE — 250N000011 HC RX IP 250 OP 636: Performed by: INTERNAL MEDICINE

## 2023-03-08 PROCEDURE — 88305 TISSUE EXAM BY PATHOLOGIST: CPT | Mod: 26 | Performed by: PATHOLOGY

## 2023-03-08 PROCEDURE — G0500 MOD SEDAT ENDO SERVICE >5YRS: HCPCS | Performed by: INTERNAL MEDICINE

## 2023-03-08 PROCEDURE — 45378 DIAGNOSTIC COLONOSCOPY: CPT | Performed by: INTERNAL MEDICINE

## 2023-03-08 PROCEDURE — 99153 MOD SED SAME PHYS/QHP EA: CPT | Performed by: INTERNAL MEDICINE

## 2023-03-08 RX ORDER — NALOXONE HYDROCHLORIDE 0.4 MG/ML
0.2 INJECTION, SOLUTION INTRAMUSCULAR; INTRAVENOUS; SUBCUTANEOUS
Status: DISCONTINUED | OUTPATIENT
Start: 2023-03-08 | End: 2023-03-08 | Stop reason: HOSPADM

## 2023-03-08 RX ORDER — NALOXONE HYDROCHLORIDE 0.4 MG/ML
0.4 INJECTION, SOLUTION INTRAMUSCULAR; INTRAVENOUS; SUBCUTANEOUS
Status: DISCONTINUED | OUTPATIENT
Start: 2023-03-08 | End: 2023-03-08 | Stop reason: HOSPADM

## 2023-03-08 RX ORDER — ONDANSETRON 2 MG/ML
4 INJECTION INTRAMUSCULAR; INTRAVENOUS EVERY 6 HOURS PRN
Status: DISCONTINUED | OUTPATIENT
Start: 2023-03-08 | End: 2023-03-08 | Stop reason: HOSPADM

## 2023-03-08 RX ORDER — ONDANSETRON 2 MG/ML
4 INJECTION INTRAMUSCULAR; INTRAVENOUS
Status: DISCONTINUED | OUTPATIENT
Start: 2023-03-08 | End: 2023-03-08 | Stop reason: HOSPADM

## 2023-03-08 RX ORDER — FLUMAZENIL 0.1 MG/ML
0.2 INJECTION, SOLUTION INTRAVENOUS
Status: DISCONTINUED | OUTPATIENT
Start: 2023-03-08 | End: 2023-03-08 | Stop reason: HOSPADM

## 2023-03-08 RX ORDER — ONDANSETRON 4 MG/1
4 TABLET, ORALLY DISINTEGRATING ORAL EVERY 6 HOURS PRN
Status: DISCONTINUED | OUTPATIENT
Start: 2023-03-08 | End: 2023-03-08 | Stop reason: HOSPADM

## 2023-03-08 RX ORDER — FENTANYL CITRATE 50 UG/ML
INJECTION, SOLUTION INTRAMUSCULAR; INTRAVENOUS PRN
Status: DISCONTINUED | OUTPATIENT
Start: 2023-03-08 | End: 2023-03-08 | Stop reason: HOSPADM

## 2023-03-08 RX ORDER — LIDOCAINE 40 MG/G
CREAM TOPICAL
Status: DISCONTINUED | OUTPATIENT
Start: 2023-03-08 | End: 2023-03-08 | Stop reason: HOSPADM

## 2023-03-08 RX ORDER — PROCHLORPERAZINE MALEATE 5 MG
5 TABLET ORAL EVERY 6 HOURS PRN
Status: DISCONTINUED | OUTPATIENT
Start: 2023-03-08 | End: 2023-03-08 | Stop reason: HOSPADM

## 2023-03-08 ASSESSMENT — ACTIVITIES OF DAILY LIVING (ADL)
ADLS_ACUITY_SCORE: 35
ADLS_ACUITY_SCORE: 35

## 2023-03-08 NOTE — OR NURSING
Pt underwent EGD with biopsies and colonoscopy with APC under conscious sedation. Specimens sent to lab. Pt transferred to recovery and report given to barbie ANDREA.       Tete Moore RN

## 2023-03-08 NOTE — H&P
Gastroenterology Pre-op History and Physical    Chema Mccullough MRN# [id number]   Age: 63 year old YOB: 1960      Date of Surgery: 03/08/23  [facility]      Date of Exam 3/8/2023 Facility Same Day       Primary care provider: Jocelin Hu         Chief Complaint and/or Reason for Procedure:   64 yo male with iron deficiency anemia. Intermittent anal bleeding.  Prior colonoscopy exams with polyps. No FH cancer. No anticoagulation.  On baby ASA.         Past Medical and Surgical History:     Past Medical History:   Diagnosis Date     Diabetes (H)      No past surgical history on file.         Medications (include herbals and vitamins):        Facility-Administered Medications Prior to Admission   Medication Dose Route Frequency Provider Last Rate Last Admin     lidocaine 1 % injection 2 mL  2 mL INTRA-ARTICULAR Once Camila Baeza MD         Medications Prior to Admission   Medication Sig Dispense Refill Last Dose     ASPIRIN LOW DOSE 81 MG EC tablet TAKE 1 TABLET BY MOUTH EVERY DAY 90 tablet 3 3/7/2023     bisacodyl (DULCOLAX) 5 MG EC tablet Take 2 tablets at 3 pm the day before your procedure. If your procedure is before 11 am, take 2 additional tablets at 11 pm. If your procedure is after 11 am, take 2 additional tablets at 6 am. For additional instructions refer to your colonoscopy prep instructions. 4 tablet 0 3/7/2023     empagliflozin (JARDIANCE) 10 MG TABS tablet Take 1 tablet (10 mg) by mouth daily 90 tablet 4 3/7/2023     metFORMIN (GLUCOPHAGE) 1000 MG tablet Take 1 tablet (1,000 mg) by mouth 2 times daily (with meals) 180 tablet 3 3/7/2023     polyethylene glycol (GOLYTELY) 236 g suspension The night before the exam at 6 pm drink an 8-ounce glass every 15 minutes until the jug is half empty. If you arrive before 11 AM: Drink the other half of the Golytely jug at 11 PM night before procedure. If you arrive after 11 AM: Drink the other  "half of the Vocollect jug at 6 AM day of procedure. For additional instructions refer to your colonoscopy prep instructions. 4000 mL 0 3/7/2023     rosuvastatin (CRESTOR) 10 MG tablet Take 1 tablet (10 mg) by mouth daily 90 tablet 3 3/7/2023             Allergies:    No Known Allergies            Physical Exam:   All vitals have been reviewed  Patient Vitals for the past 8 hrs:   BP Temp Temp src Pulse Resp SpO2 Height Weight   03/08/23 0806 108/80 98.5  F (36.9  C) Oral 82 16 98 % 1.778 m (5' 10\") 78 kg (172 lb)     No intake/output data recorded.  Airway assessment:   Patient is able to open mouth wide  Patient is able to stick out tongue  Mallampatti classification: Class I (visualization of the soft palate, fauces, uvula, anterior and posterior pillars)}      Lungs:   No increased work of breathing, good air exchange, clear to auscultation bilaterally, no crackles or wheezing     Cardiovascular:   regular rate and rhythm                 Anesthetic risk and/or ASA classification:     ASA 2    Chris Bro MD        "

## 2023-03-09 LAB — UPPER GI ENDOSCOPY: NORMAL

## 2023-03-10 ENCOUNTER — MYC MEDICAL ADVICE (OUTPATIENT)
Dept: GASTROENTEROLOGY | Facility: CLINIC | Age: 63
End: 2023-03-10
Payer: COMMERCIAL

## 2023-03-10 ENCOUNTER — TELEPHONE (OUTPATIENT)
Dept: GASTROENTEROLOGY | Facility: CLINIC | Age: 63
End: 2023-03-10
Payer: COMMERCIAL

## 2023-03-10 DIAGNOSIS — K25.3 ACUTE GASTRIC ULCER WITHOUT HEMORRHAGE OR PERFORATION: Primary | ICD-10-CM

## 2023-03-10 DIAGNOSIS — D50.0 IRON DEFICIENCY ANEMIA DUE TO CHRONIC BLOOD LOSS: Primary | ICD-10-CM

## 2023-03-10 NOTE — TELEPHONE ENCOUNTER
Replied to [employer] message that inquiry will be forwarded to provider for review.   Informed patient that provider had recommended follow up after endoscopies. Scheduling number provided.     Niki Krishnan RN

## 2023-03-10 NOTE — PROGRESS NOTES
Order placed for repeat EGD in 2 months to confirm healing of gastric ulcer.    Please feel free to call if you have any questions about this letter. I can be reached at [phone].    MICHAEL Bro MD  Professor of Medicine  Division of Gastroenterology, Hepatology and Nutrition  [facility]

## 2023-03-10 NOTE — TELEPHONE ENCOUNTER
[facility] Call Center    Phone Message    May a detailed message be left on voicemail: yes     Reason for Call: Other:      Pt is requesting a call back to discuss the results from their recent endoscopies.    Action Taken: Message routed to:  Adult Clinics: Gastroenterology (GI) p [id number]    Travel Screening: Not Applicable

## 2023-03-10 NOTE — TELEPHONE ENCOUNTER
Spoke with patient.   Patient inquired what next steps are following the endoscopies.   Patient stated that he also sent a [employer] message re: what next steps will be.   Writer informed patient that inquiry will be forwarded to provider.     Niki Krishnan RN

## 2023-03-12 LAB
PATH REPORT.ADDENDUM SPEC: NORMAL
PATH REPORT.COMMENTS IMP SPEC: NORMAL
PATH REPORT.FINAL DX SPEC: NORMAL
PATH REPORT.GROSS SPEC: NORMAL
PATH REPORT.MICROSCOPIC SPEC OTHER STN: NORMAL
PATH REPORT.RELEVANT HX SPEC: NORMAL
PHOTO IMAGE: NORMAL

## 2023-03-14 ENCOUNTER — TELEPHONE (OUTPATIENT)
Dept: ENDOCRINOLOGY | Facility: CLINIC | Age: 63
End: 2023-03-14
Payer: COMMERCIAL

## 2023-03-14 NOTE — TELEPHONE ENCOUNTER
"[employer] message sent to patient with the scheduling info for EGD and labs per provider request.    Mary Mccormack RN

## 2023-03-14 NOTE — TELEPHONE ENCOUNTER
Thyroid ultrasound noted below.  Recent EGD showed gastric ulcer with negative H. pylori.  Patient has upcoming [facility] evaluation for word finding issues.    -  COMPARISON: None     TECHNIQUE: Ultrasound examination of the thyroid and adjacent soft tissues was performed.     FINDINGS:   RIGHT LOBE (cc, trans, ap): 5.8 x  2.3 x  2.0 cm. There are several subcentimeter nodules with no high risk features. No dominant or suspicious nodules.     LEFT LOBE (cc, trans, ap): 5.9 x  2.1 x  2.1 cm. There are several subcentimeter nodules, the majority of which appear spongiform. No suspicious nodules.     Central cervical lymph nodes: No abnormal lymph nodes present.       IMPRESSION: Multinodular goiter composed of multiple subcentimeter nodules bilaterally. No suspicious nodules. (Note, subcentimeter nodules do not receive a TI-RADS score unless suspicious features are present)         ACR TI-RADS recommendations   TR1 (0) and TR2 (2 points): No FNA or follow-up     TR3 (3 points): FNA if greater than or equal to 2.5 cm; follow-up if 1.5 - 2.4 cm in (1, 3 and 5 years).     TR4 (4-6 points): FNA if greater than or equal to 1.5 cm; follow-up if 1.0 - 1.4 cm (1, 2,3 and 5 years).     TR5 (> 6 points): FNA if greater than or equal to 1 cm, follow-up if 0.5 - 0.9 cm (every year for 5 years).  Procedure Note    Sloan Lay MD - 03/09/2023   Formatting of this note might be different from the original.   IMPRESSION   COMPARISON: None     TECHNIQUE: Ultrasound examination of the thyroid and adjacent soft tissues was performed.     FINDINGS:   RIGHT LOBE (cc, trans, ap): 5.8 x  2.3 x  2.0 cm. There are several subcentimeter nodules with no high risk features. No dominant or suspicious nodules.     LEFT LOBE (cc, trans, ap): 5.9 x  2.1 x  2.1 cm. There are several subcentimeter nodules, the majority of which appear spongiform. No suspicious nodules.     Central cervical lymph nodes: No abnormal lymph nodes present.        IMPRESSION: Multinodular goiter composed of multiple subcentimeter nodules bilaterally. No suspicious nodules. (Note, subcentimeter nodules do not receive a TI-RADS score unless suspicious features are present)         ACR TI-RADS recommendations   TR1 (0) and TR2 (2 points): No FNA or follow-up     TR3 (3 points): FNA if greater than or equal to 2.5 cm; follow-up if 1.5 - 2.4 cm in (1, 3 and 5 years).     TR4 (4-6 points): FNA if greater than or equal to 1.5 cm; follow-up if 1.0 - 1.4 cm (1, 2,3 and 5 years).     TR5 (> 6 points): FNA if greater than or equal to 1 cm, follow-up if 0.5 - 0.9 cm (every year for 5 years).  Exam End: 03/09/23  2:17 PM

## 2023-03-14 NOTE — TELEPHONE ENCOUNTER
See correspondence with patient. Orders placed for hemoglobin and iron labs in May 2023. Also will need repeat EGD at that time. These orders were placed by endoscopist.     Please provide patient with scheduling information for both labs and egd.     Thank you,     Jennifer Mclain PA-C  Division of Gastroenterology, Hepatology and Nutrition   [facility]

## 2023-03-17 ENCOUNTER — VIRTUAL VISIT (OUTPATIENT)
Dept: ENDOCRINOLOGY | Facility: CLINIC | Age: 63
End: 2023-03-17
Payer: COMMERCIAL

## 2023-03-17 DIAGNOSIS — Z13.220 SCREENING FOR HYPERLIPIDEMIA: Primary | ICD-10-CM

## 2023-03-17 DIAGNOSIS — E11.9 TYPE 2 DIABETES MELLITUS WITHOUT COMPLICATION, WITHOUT LONG-TERM CURRENT USE OF INSULIN (H): ICD-10-CM

## 2023-03-17 PROCEDURE — 99214 OFFICE O/P EST MOD 30 MIN: CPT | Mod: VID | Performed by: INTERNAL MEDICINE

## 2023-03-17 RX ORDER — ROSUVASTATIN CALCIUM 10 MG/1
20 TABLET, COATED ORAL DAILY
Qty: 180 TABLET | Refills: 3 | Status: SHIPPED | OUTPATIENT
Start: 2023-03-17

## 2023-03-17 NOTE — LETTER
3/17/2023       RE: Chema Mccullough  [address] [place] [address]     Dear Colleague,    Thank you for referring your patient, Chema Mccullough, to the [facility] [place] at [facility]. Please see a copy of my visit note below.      Video-Visit Details    Type of service:  Video Visit    Virtual visit conducted by [facility].      Originating Location (pt. Location): OFFICE    Distant Location (provider location):  Off site    Mode of Communication:  Video Conference via [employer]    Physician has received verbal consent for a Video Visit from the patient? YES    Start time 903, stop time 918, chart review, documentation time, coordination of care, 15 minutes.  Total time spent day of encounter 30 minutes.    [facility]  Endocrinology  Cheri Watson MD  3/17/23    Chief Complaint:   Diabetes    History of Present Illness:   Chema Mccullough is a 62 year old male with a history of type 2 diabetes and goiter who presents for follow up evaluation    #1 Diabetes mellitus type 2  The patient was diagnosed with prediabetes in 2004 and then in 2009 found to have fasting blood sugar of 184 with A1c of 7.5%.  He was started on Metformin XR in February 2009, dose increased to 500 mg twice daily in March 2013.  A1c from 2013 - 2016 was consistently in the 8% range.  November 2015 evaluation significant for detectable C-peptide of 3.2.  June 2016 his Metformin was increased to 2000 mg daily.  December 2017 he was started on Amaryl 1 mg daily as he was experiencing postprandial hyperglycemia. A1c between April 2018-October 2018 consistently in 6% range. August 2018 PSA was 0.8, creatinine was 0.75, hemoglobin A1c of 6.6, urine for microalbumin was negative, and LDL was 78.  August 2019 hemoglobin A1c was 6.9.  During his previous visit on 8/16/19 SGLT-2 inhibitors and GLP-1 agonists were discussed, but the patient was  hesitant to start them as they were new.  August 2019 hemoglobin A1c of 6.9.  February 2020 hemoglobin A1c is 7.1.    In February 2020, I had the patient try Jardiance at 10 mg daily and stop his glimepiride.   July 2020 hemoglobin A1c is 6.8, LDL 68, TSH 0.85.December 2020 urine for protein was negative, creatinine was 0.82, hemoglobin A1c of 6.9, LDL was 80.   August 2021 LDL was 73, August 2021 when hemoglobin A1c 6.7, TSH 0.73  December 2021 hemoglobin A1c at 7.4, March 2022 hemoglobin A1c of 7.1, July 2022 hemoglobin A1c at 7.0.     February 2023 hemoglobin A1c at 7.6.  Potassium 4.4, creatinine 0.79, LDL was 101, HDL was 41, triglycerides 98    Interval history: The patient remains on Jardiance at 10 mg daily and Metformin at 1000 mg twice daily.  He has been reluctant to increase the Jardiance at this time.  He reports that he has held his aspirin since the EGD showed a stomach ulcer.  He is wondering about risk factor optimization.  He remains on Crestor 10 mg daily but is willing to increase.    Diabetes monitoring and complications:  CAD: On Crestor 10 mg daily.  On Jardiance 10 mg daily.  February 2022 .  Last eye exam results: October 2022 eye exam unremarkable  Microalbuminuria: Negative in July 2022  Neuropathy: No  HTN: No  On Statin: Yes  On Aspirin: Yes  Depression: No  Erectile dysfunction: No    #2 Hyperlipidemia  Stress test in 2016 was unremarkable. December 2017 LDL was 44. At that time he was taking Lipitor 40 mg daily. August 2019 LDL was 78.  August 2021 LDL was 73. Feb 2023 LDL at 101.    Interval History  Now on rosuvastatin at 10 mg daily.  In March 2023, EGD showed stomach ulceration attributed to aspirin use.    #3 Goiter - March 2023 US showed bilateral subcentimeter nodules  June 2016, he was noted to have a multinodular goiter with several nodules roughly 1 cm in size. October 2016 formal neck ultrasound showed multinodular goiter, with  largest nodules on the right side at  1.1 cm in size (two nodules) and the largest nodule on the left side at 1.2 cm in size.  The patient had declined ultrasound-guided FNA in October 2016. Eval with floor ultrasound in April 2017 showed the following: Right anterior nodule 1.0 x 0.6 x 0.6 cm in size, right posterior/inferior nodule at 1.0 x 0.5 x 1.0 cm in size, left inferior nodule at 0.6 x 0.5 x 0.7 cm in size. April 2018 US showed no interval change in thyroid nodules. Neck ultrasound performed in August 2020 continues to show small nodules bilaterally which are unchanged in size. Repeat neck ultrasound in April 2021 showed multiple nodules bilaterally, the largest about 1.1 cm in size-none met criteria for biopsy with no change compared with the previous exam in 2020. February 2023 TSH was 0.55,       Interval History  Repeat neck ultrasound in March 2023 showed bilateral multiple subcentimeter nodules.  No indication for biopsy.    #4 iron deficiency anemia - ulcer see on March 2023 EGD and colonscopy  Noted on blood draw in February 2023 with noted low iron at 50, Ferritin low at 8, hemoglobin 12.5.  Of note, the patient reports a history of colonoscopy in 2019 which showed some diverticulosis.    ADDENDUM: March 2023 EGD and colonoscopy significant for stomach ulcer - h. Pylori negative-this was attributed to aspirin use.  The patient has now held his baby aspirin.  He is concerned that this might increase his risk for cardiovascular events.    #5 Word finding difficulty  The patient's wife has noted patient has difficulty finding words to explain his thoughts.  The patient reports a history of cognitive decline in his mother when she was in her 90s.    Interval history: Patient has upcoming visit at [facility] for further evaluation as well as a second opinion at [facility] in August 2023 for evaluation.    Review of Systems:   Pertinent items are noted in HPI.  All other systems are negative.    Active Medications:     Current Outpatient  Medications:      ASPIRIN LOW DOSE 81 MG EC tablet, TAKE 1 TABLET BY MOUTH EVERY DAY, Disp: 90 tablet, Rfl: 3     bisacodyl (DULCOLAX) 5 MG EC tablet, Take 2 tablets at 3 pm the day before your procedure. If your procedure is before 11 am, take 2 additional tablets at 11 pm. If your procedure is after 11 am, take 2 additional tablets at 6 am. For additional instructions refer to your colonoscopy prep instructions., Disp: 4 tablet, Rfl: 0     empagliflozin (JARDIANCE) 10 MG TABS tablet, Take 1 tablet (10 mg) by mouth daily, Disp: 90 tablet, Rfl: 4     metFORMIN (GLUCOPHAGE) 1000 MG tablet, Take 1 tablet (1,000 mg) by mouth 2 times daily (with meals), Disp: 180 tablet, Rfl: 3     omeprazole (PRILOSEC) 20 MG DR capsule, Take 1 capsule (20 mg) by mouth daily, Disp: 30 capsule, Rfl: 1     polyethylene glycol (GOLYTELY) 236 g suspension, The night before the exam at 6 pm drink an 8-ounce glass every 15 minutes until the jug is half empty. If you arrive before 11 AM: Drink the other half of the Golytely jug at 11 PM night before procedure. If you arrive after 11 AM: Drink the other half of the Golytely jug at 6 AM day of procedure. For additional instructions refer to your colonoscopy prep instructions., Disp: 4000 mL, Rfl: 0     rosuvastatin (CRESTOR) 10 MG tablet, Take 1 tablet (10 mg) by mouth daily, Disp: 90 tablet, Rfl: 3    Current Facility-Administered Medications:      lidocaine 1 % injection 2 mL, 2 mL, INTRA-ARTICULAR, Once, Camila Baeza MD      Allergies:   Patient has no known allergies.      Past Medical History:  Type 2 diabetes mellitus  Goiter  Coronary arteriosclerosis  Hyperlipidemia  Obesity  Colon adenoma     Past Surgical History:  No past surgical history on file.    Family History:   Diabetes: Mother, father      Social History:   Social History     Tobacco Use     Smoking status: Former     Packs/day: 1.00     Types: Cigarettes, Cigars     Start date: 10/10/1978     Quit date: 1/1/2011      "Years since quittin.2     Smokeless tobacco: Former   Substance Use Topics     Alcohol use: No     Drug use: No        Physical Exam:   GENERAL: Healthy, alert and no distress\",\"EYES: Eyes grossly normal to inspection.  No discharge or erythema, or obvious scleral/conjunctival abnormalities.\",\"RESP: No audible wheeze, cough, or visible cyanosis.  No visible retractions or increased work of breathing.  \",\"SKIN: Visible skin clear. No significant rash, abnormal pigmentation or lesions.\",\"NEURO: Cranial nerves grossly intact.  Mentation and speech appropriate for age, although the patient does seem to have difficulty noting the precise words to explain his thoughts..\",\"PSYCH: Mentation appears normal, affect normal/bright, judgement and insight intact, normal speech and appearance well-groomed.       Data:COMPARISON: None     TECHNIQUE: Ultrasound examination of the thyroid and adjacent soft tissues was performed.     FINDINGS:   RIGHT LOBE (cc, trans, ap): 5.8 x  2.3 x  2.0 cm. There are several subcentimeter nodules with no high risk features. No dominant or suspicious nodules.     LEFT LOBE (cc, trans, ap): 5.9 x  2.1 x  2.1 cm. There are several subcentimeter nodules, the majority of which appear spongiform. No suspicious nodules.     Central cervical lymph nodes: No abnormal lymph nodes present.       IMPRESSION: Multinodular goiter composed of multiple subcentimeter nodules bilaterally. No suspicious nodules. (Note, subcentimeter nodules do not receive a TI-RADS score unless suspicious features are present)         ACR TI-RADS recommendations   TR1 (0) and TR2 (2 points): No FNA or follow-up     TR3 (3 points): FNA if greater than or equal to 2.5 cm; follow-up if 1.5 - 2.4 cm in (1, 3 and 5 years).     TR4 (4-6 points): FNA if greater than or equal to 1.5 cm; follow-up if 1.0 - 1.4 cm (1, 2,3 and 5 years).     TR5 (> 6 points): FNA if greater than or equal to 1 cm, follow-up if 0.5 - 0.9 cm (every year for 5 " years).    Addended Report: Surg Path Ex  Surgical Pathology Report (Final result) [id number]  Authorizing Provider: Chris Bro MD Ordering Provider: Chris Bro MD  Ordering Location: [facility] Collected: 03/08/2023 09:29 AM  Pathologist: Tanmay Angeles MD Received: 03/08/2023 10:29 AM  .  Specimens  A Small Intestine, Duodenum, Duodenal biopsy  B Stomach, Peripyloric pale mucosa and small ulcer  C Stomach, Antrum greater curvature  D Stomach, Antrum lesser curvature  E Stomach, Incisura  F Stomach, Body greater curvature  G Stomach, Body lesser curvature  .  Addendum  Immunoperoxidase stains for Helicobacter pylori are performed on blocks B-G and are all negative (appropriate controls obtained).  Addendum electronically signed by Tanmay Angeles MD on 3/12/2023 at 11:02 AM    Assessment and Plan:  #1 Diabetes mellitus type 2  February 2023 hemoglobin A1c higher at 7.6.  Although I would ideally like him to increase his Jardiance to 25 mg daily, while continuing with the metformin at daily, the patient has been reluctant to do so.  He would like to work on dietary lifestyle in the interim.  I think this would be reasonable.    #2 Hyperlipidemia  Discussed with patient that especially since he has held his baby aspirin, his cardiovascular risk factors should be optimized.  Patient instructed to increase his rosuvastatin to 20 mg daily.  Prescription sent to pharmacy.  I think we should hold his baby aspirin for now given the noted ulcer on March 2023 EGD.  His last stress test was in 2016.  His last CT scan for coronary artery calcification was 2012.  We will have the patient see cardiology again for risk factor reassessment.    #3 Goiter - March 2023 US showed bilateral subcentimeter nodules  We will consider repeat thyroid nodule perhaps in 2025 or 2026.    #4 iron deficiency anemia - ulcer (h pylori negative)seen on March 2023 EGD and  colonscopy  Reassuring.  Per GI.  Baby aspirin held for now.    #5 Word finding difficulty  Agree with neurology evaluation.  Patient will let me know how this goes.    Patient to see cardiology.  Patient to do labs before he leaves in May 2023 for a trip.  Patient to see me in 6 months for follow-up.      Cheri Watson MD

## 2023-03-17 NOTE — PROGRESS NOTES
Video-Visit Details    Type of service:  Video Visit    Virtual visit conducted by Parish.      Originating Location (pt. Location): OFFICE    Distant Location (provider location):  Off site    Mode of Communication:  Video Conference via [employer]    Physician has received verbal consent for a Video Visit from the patient? YES    Start time 903, stop time 918, chart review, documentation time, coordination of care, 15 minutes.  Total time spent day of encounter 30 minutes.    [facility]  Endocrinology  Cheri Watson MD  3/17/23    Chief Complaint:   Diabetes    History of Present Illness:   Chema Mccullough is a 62 year old male with a history of type 2 diabetes and goiter who presents for follow up evaluation    #1 Diabetes mellitus type 2  The patient was diagnosed with prediabetes in 2004 and then in 2009 found to have fasting blood sugar of 184 with A1c of 7.5%.  He was started on Metformin XR in February 2009, dose increased to 500 mg twice daily in March 2013.  A1c from 2013 - 2016 was consistently in the 8% range.  November 2015 evaluation significant for detectable C-peptide of 3.2.  June 2016 his Metformin was increased to 2000 mg daily.  December 2017 he was started on Amaryl 1 mg daily as he was experiencing postprandial hyperglycemia. A1c between April 2018-October 2018 consistently in 6% range. August 2018 PSA was 0.8, creatinine was 0.75, hemoglobin A1c of 6.6, urine for microalbumin was negative, and LDL was 78.  August 2019 hemoglobin A1c was 6.9.  During his previous visit on 8/16/19 SGLT-2 inhibitors and GLP-1 agonists were discussed, but the patient was hesitant to start them as they were new.  August 2019 hemoglobin A1c of 6.9.  February 2020 hemoglobin A1c is 7.1.    In February 2020, I had the patient try Jardiance at 10 mg daily and stop his glimepiride.   July 2020 hemoglobin A1c is 6.8, LDL 68, TSH 0.85.December 2020 urine for protein was negative, creatinine was 0.82, hemoglobin  A1c of 6.9, LDL was 80.   August 2021 LDL was 73, August 2021 when hemoglobin A1c 6.7, TSH 0.73  December 2021 hemoglobin A1c at 7.4, March 2022 hemoglobin A1c of 7.1, July 2022 hemoglobin A1c at 7.0.     February 2023 hemoglobin A1c at 7.6.  Potassium 4.4, creatinine 0.79, LDL was 101, HDL was 41, triglycerides 98    Interval history: The patient remains on Jardiance at 10 mg daily and Metformin at 1000 mg twice daily.  He has been reluctant to increase the Jardiance at this time.  He reports that he has held his aspirin since the EGD showed a stomach ulcer.  He is wondering about risk factor optimization.  He remains on Crestor 10 mg daily but is willing to increase.    Diabetes monitoring and complications:  CAD: On Crestor 10 mg daily.  On Jardiance 10 mg daily.  February 2022 .  Last eye exam results: October 2022 eye exam unremarkable  Microalbuminuria: Negative in July 2022  Neuropathy: No  HTN: No  On Statin: Yes  On Aspirin: Yes  Depression: No  Erectile dysfunction: No    #2 Hyperlipidemia  Stress test in 2016 was unremarkable. December 2017 LDL was 44. At that time he was taking Lipitor 40 mg daily. August 2019 LDL was 78.  August 2021 LDL was 73. Feb 2023 LDL at 101.    Interval History  Now on rosuvastatin at 10 mg daily.  In March 2023, EGD showed stomach ulceration attributed to aspirin use.    #3 Goiter - March 2023 US showed bilateral subcentimeter nodules  June 2016, he was noted to have a multinodular goiter with several nodules roughly 1 cm in size. October 2016 formal neck ultrasound showed multinodular goiter, with  largest nodules on the right side at 1.1 cm in size (two nodules) and the largest nodule on the left side at 1.2 cm in size.  The patient had declined ultrasound-guided FNA in October 2016. Eval with floor ultrasound in April 2017 showed the following: Right anterior nodule 1.0 x 0.6 x 0.6 cm in size, right posterior/inferior nodule at 1.0 x 0.5 x 1.0 cm in size, left  inferior nodule at 0.6 x 0.5 x 0.7 cm in size. April 2018 US showed no interval change in thyroid nodules. Neck ultrasound performed in August 2020 continues to show small nodules bilaterally which are unchanged in size. Repeat neck ultrasound in April 2021 showed multiple nodules bilaterally, the largest about 1.1 cm in size-none met criteria for biopsy with no change compared with the previous exam in 2020. February 2023 TSH was 0.55,       Interval History  Repeat neck ultrasound in March 2023 showed bilateral multiple subcentimeter nodules.  No indication for biopsy.    #4 iron deficiency anemia - ulcer see on March 2023 EGD and colonscopy  Noted on blood draw in February 2023 with noted low iron at 50, Ferritin low at 8, hemoglobin 12.5.  Of note, the patient reports a history of colonoscopy in 2019 which showed some diverticulosis.    ADDENDUM: March 2023 EGD and colonoscopy significant for stomach ulcer - h. Pylori negative-this was attributed to aspirin use.  The patient has now held his baby aspirin.  He is concerned that this might increase his risk for cardiovascular events.    #5 Word finding difficulty  The patient's wife has noted patient has difficulty finding words to explain his thoughts.  The patient reports a history of cognitive decline in his mother when she was in her 90s.    Interval history: Patient has upcoming visit at [facility] for further evaluation as well as a second opinion at [facility] in August 2023 for evaluation.    Review of Systems:   Pertinent items are noted in HPI.  All other systems are negative.    Active Medications:     Current Outpatient Medications:      ASPIRIN LOW DOSE 81 MG EC tablet, TAKE 1 TABLET BY MOUTH EVERY DAY, Disp: 90 tablet, Rfl: 3     bisacodyl (DULCOLAX) 5 MG EC tablet, Take 2 tablets at 3 pm the day before your procedure. If your procedure is before 11 am, take 2 additional tablets at 11 pm. If your procedure is after 11 am, take 2 additional tablets at 6 am.  "For additional instructions refer to your colonoscopy prep instructions., Disp: 4 tablet, Rfl: 0     empagliflozin (JARDIANCE) 10 MG TABS tablet, Take 1 tablet (10 mg) by mouth daily, Disp: 90 tablet, Rfl: 4     metFORMIN (GLUCOPHAGE) 1000 MG tablet, Take 1 tablet (1,000 mg) by mouth 2 times daily (with meals), Disp: 180 tablet, Rfl: 3     omeprazole (PRILOSEC) 20 MG DR capsule, Take 1 capsule (20 mg) by mouth daily, Disp: 30 capsule, Rfl: 1     polyethylene glycol (GOLYTELY) 236 g suspension, The night before the exam at 6 pm drink an 8-ounce glass every 15 minutes until the jug is half empty. If you arrive before 11 AM: Drink the other half of the Golytely jug at 11 PM night before procedure. If you arrive after 11 AM: Drink the other half of the Golytely jug at 6 AM day of procedure. For additional instructions refer to your colonoscopy prep instructions., Disp: 4000 mL, Rfl: 0     rosuvastatin (CRESTOR) 10 MG tablet, Take 1 tablet (10 mg) by mouth daily, Disp: 90 tablet, Rfl: 3    Current Facility-Administered Medications:      lidocaine 1 % injection 2 mL, 2 mL, INTRA-ARTICULAR, Once, Camila Baeza MD      Allergies:   Patient has no known allergies.      Past Medical History:  Type 2 diabetes mellitus  Goiter  Coronary arteriosclerosis  Hyperlipidemia  Obesity  Colon adenoma     Past Surgical History:  No past surgical history on file.    Family History:   Diabetes: Mother, father      Social History:   Social History     Tobacco Use     Smoking status: Former     Packs/day: 1.00     Types: Cigarettes, Cigars     Start date: 10/10/1978     Quit date: 2011     Years since quittin.2     Smokeless tobacco: Former   Substance Use Topics     Alcohol use: No     Drug use: No        Physical Exam:   GENERAL: Healthy, alert and no distress\",\"EYES: Eyes grossly normal to inspection.  No discharge or erythema, or obvious scleral/conjunctival abnormalities.\",\"RESP: No audible wheeze, cough, or visible " "cyanosis.  No visible retractions or increased work of breathing.  \",\"SKIN: Visible skin clear. No significant rash, abnormal pigmentation or lesions.\",\"NEURO: Cranial nerves grossly intact.  Mentation and speech appropriate for age, although the patient does seem to have difficulty noting the precise words to explain his thoughts..\",\"PSYCH: Mentation appears normal, affect normal/bright, judgement and insight intact, normal speech and appearance well-groomed.       Data:COMPARISON: None     TECHNIQUE: Ultrasound examination of the thyroid and adjacent soft tissues was performed.     FINDINGS:   RIGHT LOBE (cc, trans, ap): 5.8 x  2.3 x  2.0 cm. There are several subcentimeter nodules with no high risk features. No dominant or suspicious nodules.     LEFT LOBE (cc, trans, ap): 5.9 x  2.1 x  2.1 cm. There are several subcentimeter nodules, the majority of which appear spongiform. No suspicious nodules.     Central cervical lymph nodes: No abnormal lymph nodes present.       IMPRESSION: Multinodular goiter composed of multiple subcentimeter nodules bilaterally. No suspicious nodules. (Note, subcentimeter nodules do not receive a TI-RADS score unless suspicious features are present)         ACR TI-RADS recommendations   TR1 (0) and TR2 (2 points): No FNA or follow-up     TR3 (3 points): FNA if greater than or equal to 2.5 cm; follow-up if 1.5 - 2.4 cm in (1, 3 and 5 years).     TR4 (4-6 points): FNA if greater than or equal to 1.5 cm; follow-up if 1.0 - 1.4 cm (1, 2,3 and 5 years).     TR5 (> 6 points): FNA if greater than or equal to 1 cm, follow-up if 0.5 - 0.9 cm (every year for 5 years).    Addended Report: Surg Path Ex  Surgical Pathology Report (Final result) [id number]  Authorizing Provider: Chris Bro MD Ordering Provider: Chris Bro MD  Ordering Location: [facility] Collected: 03/08/2023 09:29 AM  Pathologist: Tanmay Angeles MD Received: 03/08/2023 10:29 " AM  .  Specimens  A Small Intestine, Duodenum, Duodenal biopsy  B Stomach, Peripyloric pale mucosa and small ulcer  C Stomach, Antrum greater curvature  D Stomach, Antrum lesser curvature  E Stomach, Incisura  F Stomach, Body greater curvature  G Stomach, Body lesser curvature  .  Addendum  Immunoperoxidase stains for Helicobacter pylori are performed on blocks B-G and are all negative (appropriate controls obtained).  Addendum electronically signed by Tanmay Angeles MD on 3/12/2023 at 11:02 AM    Assessment and Plan:  #1 Diabetes mellitus type 2  February 2023 hemoglobin A1c higher at 7.6.  Although I would ideally like him to increase his Jardiance to 25 mg daily, while continuing with the metformin at daily, the patient has been reluctant to do so.  He would like to work on dietary lifestyle in the interim.  I think this would be reasonable.    #2 Hyperlipidemia  Discussed with patient that especially since he has held his baby aspirin, his cardiovascular risk factors should be optimized.  Patient instructed to increase his rosuvastatin to 20 mg daily.  Prescription sent to pharmacy.  I think we should hold his baby aspirin for now given the noted ulcer on March 2023 EGD.  His last stress test was in 2016.  His last CT scan for coronary artery calcification was 2012.  We will have the patient see cardiology again for risk factor reassessment.    #3 Goiter - March 2023 US showed bilateral subcentimeter nodules  We will consider repeat thyroid nodule perhaps in 2025 or 2026.    #4 iron deficiency anemia - ulcer (h pylori negative)seen on March 2023 EGD and colonscopy  Reassuring.  Per GI.  Baby aspirin held for now.    #5 Word finding difficulty  Agree with neurology evaluation.  Patient will let me know how this goes.    Patient to see cardiology.  Patient to do labs before he leaves in May 2023 for a trip.  Patient to see me in 6 months for follow-up.

## 2023-03-17 NOTE — NURSING NOTE
Is the patient currently in the state of [place]? YES    Visit mode:VIDEO    If the visit is dropped, the patient can be reconnected by: VIDEO VISIT: Text to cell phone: [phone]    Will anyone else be joining the visit? NO    How would you like to obtain your AVS? Mail a copy    Are changes needed to the allergy or medication list? NO    Reason for visit:   Chief Complaint   Patient presents with     Video Visit     Type 2 Diabetes       Betsy Gonzalez MA

## 2023-03-22 ENCOUNTER — TELEPHONE (OUTPATIENT)
Dept: ENDOCRINOLOGY | Facility: CLINIC | Age: 63
End: 2023-03-22

## 2023-03-22 NOTE — TELEPHONE ENCOUNTER
Attempted to reach pt x2 to schedule 6 month follow up with Dr. Watson - will also need labs completed in May 2023.    Betsy Gonzalez MA

## 2023-03-27 ENCOUNTER — TELEPHONE (OUTPATIENT)
Dept: GASTROENTEROLOGY | Facility: CLINIC | Age: 63
End: 2023-03-27
Payer: COMMERCIAL

## 2023-03-27 NOTE — TELEPHONE ENCOUNTER
Screening completed 2/24/2023. No changes in health history since then, per pt.     - SCHEDULING DETAILS -  NO Hospital Setting Required? If yes, what is the exclusion?:    GARCIA  Surgeon    5/3/2023  Date of Procedure  Upper Endoscopy [EGD]  Type of Procedure Scheduled  [facility]- [facility]     MODERATE Sedation Type     NO PAC / Pre-op Required

## 2023-03-30 DIAGNOSIS — K25.3 ACUTE GASTRIC ULCER WITHOUT HEMORRHAGE OR PERFORATION: ICD-10-CM

## 2023-04-15 ENCOUNTER — HEALTH MAINTENANCE LETTER (OUTPATIENT)
Age: 63
End: 2023-04-15

## 2023-04-18 ENCOUNTER — TELEPHONE (OUTPATIENT)
Dept: GASTROENTEROLOGY | Facility: CLINIC | Age: 63
End: 2023-04-18

## 2023-04-18 NOTE — TELEPHONE ENCOUNTER
Pre assessment questions completed for upcoming Upper endoscopy (EGD) procedure scheduled on 5.3.23    COVID policy reviewed.     Reviewed procedural arrival time 1030 and facility location [facility]; [address], [address], [place] [address]    Designated  policy reviewed. Instructed to have someone stay 6 hours post procedure.     Anticoagulation/blood thinners? No    Electronic implanted devices? No    Diabetic? Yes - Patient to hold oral diabetic medications day of procedure    Reviewed EGD procedure prep instructions.     Patient verbalized understanding and had no questions or concerns at this time.    Lalitha Chadwick, ZULLY  Endoscopy Procedure Pre Assessment RN

## 2023-04-28 RX ORDER — LIDOCAINE 40 MG/G
CREAM TOPICAL
Status: CANCELLED | OUTPATIENT
Start: 2023-04-28

## 2023-04-30 DIAGNOSIS — K25.3 ACUTE GASTRIC ULCER WITHOUT HEMORRHAGE OR PERFORATION: ICD-10-CM

## 2023-05-02 ENCOUNTER — TELEPHONE (OUTPATIENT)
Dept: ENDOCRINOLOGY | Facility: CLINIC | Age: 63
End: 2023-05-02
Payer: COMMERCIAL

## 2023-05-02 NOTE — TELEPHONE ENCOUNTER
[facility] requests orders. Pt in lab currently.  Fax   nacho Crandall/ PCP Dr Hu   Lab orders from Dr Watson faxed.   Ethel Kwong RN on 5/2/2023 at 10:10 AM

## 2023-05-03 ENCOUNTER — HOSPITAL ENCOUNTER (OUTPATIENT)
Facility: CLINIC | Age: 63
Discharge: HOME | End: 2023-05-03
Attending: INTERNAL MEDICINE | Admitting: INTERNAL MEDICINE
Payer: COMMERCIAL

## 2023-05-03 VITALS
SYSTOLIC BLOOD PRESSURE: 114 MMHG | OXYGEN SATURATION: 96 % | RESPIRATION RATE: 16 BRPM | HEART RATE: 69 BPM | DIASTOLIC BLOOD PRESSURE: 79 MMHG

## 2023-05-03 LAB — UPPER GI ENDOSCOPY: NORMAL

## 2023-05-03 PROCEDURE — 999N000099 HC STATISTIC MODERATE SEDATION < 10 MIN: Performed by: INTERNAL MEDICINE

## 2023-05-03 PROCEDURE — 250N000011 HC RX IP 250 OP 636: Performed by: INTERNAL MEDICINE

## 2023-05-03 PROCEDURE — 88305 TISSUE EXAM BY PATHOLOGIST: CPT | Mod: TC | Performed by: INTERNAL MEDICINE

## 2023-05-03 PROCEDURE — 43239 EGD BIOPSY SINGLE/MULTIPLE: CPT | Performed by: INTERNAL MEDICINE

## 2023-05-03 PROCEDURE — 88305 TISSUE EXAM BY PATHOLOGIST: CPT | Mod: 26 | Performed by: PATHOLOGY

## 2023-05-03 RX ORDER — NALOXONE HYDROCHLORIDE 0.4 MG/ML
0.4 INJECTION, SOLUTION INTRAMUSCULAR; INTRAVENOUS; SUBCUTANEOUS
Status: DISCONTINUED | OUTPATIENT
Start: 2023-05-03 | End: 2023-05-03 | Stop reason: HOSPADM

## 2023-05-03 RX ORDER — ONDANSETRON 4 MG/1
4 TABLET, ORALLY DISINTEGRATING ORAL EVERY 6 HOURS PRN
Status: DISCONTINUED | OUTPATIENT
Start: 2023-05-03 | End: 2023-05-03 | Stop reason: HOSPADM

## 2023-05-03 RX ORDER — NALOXONE HYDROCHLORIDE 0.4 MG/ML
0.2 INJECTION, SOLUTION INTRAMUSCULAR; INTRAVENOUS; SUBCUTANEOUS
Status: DISCONTINUED | OUTPATIENT
Start: 2023-05-03 | End: 2023-05-03 | Stop reason: HOSPADM

## 2023-05-03 RX ORDER — ONDANSETRON 2 MG/ML
4 INJECTION INTRAMUSCULAR; INTRAVENOUS
Status: DISCONTINUED | OUTPATIENT
Start: 2023-05-03 | End: 2023-05-03 | Stop reason: HOSPADM

## 2023-05-03 RX ORDER — LIDOCAINE 40 MG/G
CREAM TOPICAL
Status: DISCONTINUED | OUTPATIENT
Start: 2023-05-03 | End: 2023-05-03 | Stop reason: HOSPADM

## 2023-05-03 RX ORDER — FENTANYL CITRATE 50 UG/ML
INJECTION, SOLUTION INTRAMUSCULAR; INTRAVENOUS PRN
Status: DISCONTINUED | OUTPATIENT
Start: 2023-05-03 | End: 2023-05-03 | Stop reason: HOSPADM

## 2023-05-03 RX ORDER — PROCHLORPERAZINE MALEATE 5 MG
5 TABLET ORAL EVERY 6 HOURS PRN
Status: DISCONTINUED | OUTPATIENT
Start: 2023-05-03 | End: 2023-05-03 | Stop reason: HOSPADM

## 2023-05-03 RX ORDER — FLUMAZENIL 0.1 MG/ML
0.2 INJECTION, SOLUTION INTRAVENOUS
Status: DISCONTINUED | OUTPATIENT
Start: 2023-05-03 | End: 2023-05-03 | Stop reason: HOSPADM

## 2023-05-03 RX ORDER — ONDANSETRON 2 MG/ML
4 INJECTION INTRAMUSCULAR; INTRAVENOUS EVERY 6 HOURS PRN
Status: DISCONTINUED | OUTPATIENT
Start: 2023-05-03 | End: 2023-05-03 | Stop reason: HOSPADM

## 2023-05-03 ASSESSMENT — ACTIVITIES OF DAILY LIVING (ADL)
ADLS_ACUITY_SCORE: 35
ADLS_ACUITY_SCORE: 35

## 2023-05-03 NOTE — H&P
See H+P by myself 3/8/23.   Pt here for follow-up EGD to confirm healing of gastric ulcer.  Recent labs show persistent iron deficiency.    No changes in history. Reviewed.  ASA 2.    J. Gustavo Bro MD  Professor of Medicine  Division of Gastroenterology, Hepatology and Nutrition  [facility]

## 2023-05-04 ENCOUNTER — MYC MEDICAL ADVICE (OUTPATIENT)
Dept: GASTROENTEROLOGY | Facility: CLINIC | Age: 63
End: 2023-05-04
Payer: COMMERCIAL

## 2023-05-04 ENCOUNTER — TELEPHONE (OUTPATIENT)
Dept: GASTROENTEROLOGY | Facility: CLINIC | Age: 63
End: 2023-05-04
Payer: COMMERCIAL

## 2023-05-04 DIAGNOSIS — D50.8 OTHER IRON DEFICIENCY ANEMIA: Primary | ICD-10-CM

## 2023-05-04 PROBLEM — D50.9 ANEMIA, IRON DEFICIENCY: Status: ACTIVE | Noted: 2023-05-04

## 2023-05-04 LAB
PATH REPORT.COMMENTS IMP SPEC: NORMAL
PATH REPORT.FINAL DX SPEC: NORMAL
PATH REPORT.GROSS SPEC: NORMAL
PATH REPORT.MICROSCOPIC SPEC OTHER STN: NORMAL
PATH REPORT.RELEVANT HX SPEC: NORMAL
PHOTO IMAGE: NORMAL

## 2023-05-04 RX ORDER — MEPERIDINE HYDROCHLORIDE 25 MG/ML
25 INJECTION INTRAMUSCULAR; INTRAVENOUS; SUBCUTANEOUS EVERY 30 MIN PRN
Status: CANCELLED | OUTPATIENT
Start: 2023-05-04

## 2023-05-04 RX ORDER — EPINEPHRINE 1 MG/ML
0.3 INJECTION, SOLUTION INTRAMUSCULAR; SUBCUTANEOUS EVERY 5 MIN PRN
Status: CANCELLED | OUTPATIENT
Start: 2023-05-04

## 2023-05-04 RX ORDER — ALBUTEROL SULFATE 90 UG/1
1-2 AEROSOL, METERED RESPIRATORY (INHALATION)
Status: CANCELLED
Start: 2023-05-04

## 2023-05-04 RX ORDER — METHYLPREDNISOLONE SODIUM SUCCINATE 125 MG/2ML
125 INJECTION, POWDER, LYOPHILIZED, FOR SOLUTION INTRAMUSCULAR; INTRAVENOUS
Status: CANCELLED
Start: 2023-05-04

## 2023-05-04 RX ORDER — ALBUTEROL SULFATE 0.83 MG/ML
2.5 SOLUTION RESPIRATORY (INHALATION)
Status: CANCELLED | OUTPATIENT
Start: 2023-05-04

## 2023-05-04 RX ORDER — DIPHENHYDRAMINE HYDROCHLORIDE 50 MG/ML
50 INJECTION INTRAMUSCULAR; INTRAVENOUS
Status: CANCELLED
Start: 2023-05-04

## 2023-05-04 NOTE — TELEPHONE ENCOUNTER
[facility] Call Center    Phone Message    May a detailed message be left on voicemail: yes     Reason for Call: Other: Pt is returning a call from Jennifer Mclain, requesting she please reach back out to discuss his colonoscopy. Please advise. Thank you!     Action Taken: Message routed to:  [facility] GI    Travel Screening: Not Applicable

## 2023-05-04 NOTE — TELEPHONE ENCOUNTER
Noted, currently corresponding with patient through [employer] and will plan to return call later this afternoon.     Jennifer Mclain PA-C  Division of Gastroenterology, Hepatology and Nutrition   [facility]

## 2023-05-04 NOTE — TELEPHONE ENCOUNTER
Patient with iron def. No improvement in his iron levels despite iron supplementation. Iron has actually decreased.     Plan is to get VCE and iron infusions.     Patient is actually going to be out of the country for 2 months by the end of May. Trying to accommodate him prior to his leave.     Please see orders.     Jennifer Mclain PA-C  Division of Gastroenterology, Hepatology and Nutrition   [facility]

## 2023-05-04 NOTE — TELEPHONE ENCOUNTER
Spoke with patient re: preference for iron infusions.     Patient stated that the [facility] would work (he works at the [employer]) or [facility].     Niki Krishnan RN

## 2023-05-05 ENCOUNTER — PATIENT OUTREACH (OUTPATIENT)
Dept: GASTROENTEROLOGY | Facility: CLINIC | Age: 63
End: 2023-05-05
Payer: COMMERCIAL

## 2023-05-05 NOTE — TELEPHONE ENCOUNTER
Pt called after reading your pathology note, with question about iron levels that were low. Why are the iron levels low, especially due to the fact that he was on an iron supplement? He states he is only taking 22mg of iron daily, which he was reading that he should be taking 3 X this dose. Could this low dosage be responsible for why his iron levels remain low?    Message route to Dr Bro.    Lela Cary, RN, BSN,   [facility]  Care coordinator

## 2023-05-05 NOTE — TELEPHONE ENCOUNTER
Late entry. Spoke with patient this afternoon in regards to iron infusions and VCE. Orders placed. See mychart encounter from same date May 4, 2023     Jennifer Mclain PA-C  Division of Gastroenterology, Hepatology and Nutrition   [facility]

## 2023-05-08 ENCOUNTER — APPOINTMENT (OUTPATIENT)
Dept: LAB | Facility: CLINIC | Age: 63
End: 2023-05-08
Payer: COMMERCIAL

## 2023-05-08 ENCOUNTER — TELEPHONE (OUTPATIENT)
Dept: GASTROENTEROLOGY | Facility: CLINIC | Age: 63
End: 2023-05-08
Payer: COMMERCIAL

## 2023-05-08 DIAGNOSIS — D50.0 IRON DEFICIENCY ANEMIA DUE TO CHRONIC BLOOD LOSS: Primary | ICD-10-CM

## 2023-05-08 NOTE — TELEPHONE ENCOUNTER
Called and spoke with patient this afternoon to answer his questions.     He just finished omeprazole pill today. Will stay off of it since ulcer has healed. He is concerned that this could have affected his iron absorption. He was also taking low iron dose of 22 mg.     He is scheduled for his iron infusions on 5/11/23, 5/16/23 and 5/22/23. He would like to have his iron and ferritin checked before these infusions.     Jennifer Mclain PA-C  Division of Gastroenterology, Hepatology and Nutrition   [facility]

## 2023-05-08 NOTE — TELEPHONE ENCOUNTER
[facility] Call Center    Phone Message    May a detailed message be left on voicemail: yes     Reason for Call: Other: Is wondering if patient's iron needs to be checked after infusion. Please call to discuss.     Action Taken: Message routed to:  [facility] gi    Travel Screening: Not Applicable

## 2023-05-08 NOTE — TELEPHONE ENCOUNTER
"Spoke with patient.   Informed patient that coverage for iron infusions. Provided number to infusion center at the [facility].   Patient had many questions such as if iron level could be checked prior to infusion(s), if he necessarily needs all three infusions, if he could do a \"check and see\" - check iron level, to se if he needs infusion; did he really need all these things completed now.     Informed patient that inquiries will be forwarded to provider and will have provider reach out to patient.   Spoke with provider - stated she will call patient back.     Niki Krishnan RN    "

## 2023-05-09 ENCOUNTER — TELEPHONE (OUTPATIENT)
Dept: GASTROENTEROLOGY | Facility: CLINIC | Age: 63
End: 2023-05-09

## 2023-05-09 DIAGNOSIS — D50.0 IRON DEFICIENCY ANEMIA DUE TO CHRONIC BLOOD LOSS: Primary | ICD-10-CM

## 2023-05-09 RX ORDER — BISACODYL 5 MG/1
TABLET, DELAYED RELEASE ORAL
Qty: 2 TABLET | Refills: 0 | Status: SHIPPED | OUTPATIENT
Start: 2023-05-09 | End: 2023-08-03

## 2023-05-09 NOTE — TELEPHONE ENCOUNTER
Pre assessment questions completed for upcoming Video capsule endoscopy  procedure scheduled on 5/15/23    COVID policy reviewed.     Pre-op exam? N/A    Reviewed procedural arrival time 0800, procedure time 0900 and facility location [facility]; [address], [address], [place] [address]    Designated  policy reviewed. No sedation    NSAIDs? No    Anticoagulation/blood thinners? No    Electronic implanted devices? No    Diabetic? Yes - Patient to hold oral diabetic medications day of procedure- Metformin, Jardiance    Procedure indication: iron deficiency anemia    Bowel prep recommendation: Standard Golytely d/t DM    Reviewed procedure prep instructions.     Prep instructions sent via [employer].  Bowel prep script sent to     [facility] [id number] [place] [id number] [address]     Patient verbalized understanding and had no questions or concerns at this time.    Danyelle Schmid RN  Endoscopy Procedure Pre Assessment RN

## 2023-05-11 ENCOUNTER — INFUSION THERAPY VISIT (OUTPATIENT)
Dept: INFUSION THERAPY | Facility: CLINIC | Age: 63
End: 2023-05-11
Attending: PHYSICIAN ASSISTANT
Payer: COMMERCIAL

## 2023-05-11 VITALS
SYSTOLIC BLOOD PRESSURE: 107 MMHG | DIASTOLIC BLOOD PRESSURE: 67 MMHG | TEMPERATURE: 98 F | RESPIRATION RATE: 16 BRPM | HEART RATE: 74 BPM | OXYGEN SATURATION: 97 %

## 2023-05-11 DIAGNOSIS — D50.0 IRON DEFICIENCY ANEMIA DUE TO CHRONIC BLOOD LOSS: ICD-10-CM

## 2023-05-11 DIAGNOSIS — D50.8 OTHER IRON DEFICIENCY ANEMIA: Primary | ICD-10-CM

## 2023-05-11 LAB
FERRITIN SERPL-MCNC: 10 NG/ML (ref 31–409)
IRON BINDING CAPACITY (ROCHE): 422 UG/DL (ref 240–430)
IRON SATN MFR SERPL: 10 % (ref 15–46)
IRON SERPL-MCNC: 42 UG/DL (ref 61–157)

## 2023-05-11 PROCEDURE — 96365 THER/PROPH/DIAG IV INF INIT: CPT

## 2023-05-11 PROCEDURE — 36415 COLL VENOUS BLD VENIPUNCTURE: CPT

## 2023-05-11 PROCEDURE — 96366 THER/PROPH/DIAG IV INF ADDON: CPT

## 2023-05-11 PROCEDURE — 258N000003 HC RX IP 258 OP 636: Performed by: PHYSICIAN ASSISTANT

## 2023-05-11 PROCEDURE — 250N000011 HC RX IP 250 OP 636: Performed by: PHYSICIAN ASSISTANT

## 2023-05-11 PROCEDURE — 83550 IRON BINDING TEST: CPT

## 2023-05-11 PROCEDURE — 82728 ASSAY OF FERRITIN: CPT

## 2023-05-11 RX ORDER — MEPERIDINE HYDROCHLORIDE 25 MG/ML
25 INJECTION INTRAMUSCULAR; INTRAVENOUS; SUBCUTANEOUS EVERY 30 MIN PRN
Status: CANCELLED | OUTPATIENT
Start: 2023-05-13

## 2023-05-11 RX ORDER — METHYLPREDNISOLONE SODIUM SUCCINATE 125 MG/2ML
125 INJECTION, POWDER, LYOPHILIZED, FOR SOLUTION INTRAMUSCULAR; INTRAVENOUS
Status: CANCELLED
Start: 2023-05-13

## 2023-05-11 RX ORDER — ALBUTEROL SULFATE 90 UG/1
1-2 AEROSOL, METERED RESPIRATORY (INHALATION)
Status: CANCELLED
Start: 2023-05-13

## 2023-05-11 RX ORDER — DIPHENHYDRAMINE HYDROCHLORIDE 50 MG/ML
50 INJECTION INTRAMUSCULAR; INTRAVENOUS
Status: CANCELLED
Start: 2023-05-13

## 2023-05-11 RX ORDER — ALBUTEROL SULFATE 0.83 MG/ML
2.5 SOLUTION RESPIRATORY (INHALATION)
Status: CANCELLED | OUTPATIENT
Start: 2023-05-13

## 2023-05-11 RX ORDER — EPINEPHRINE 1 MG/ML
0.3 INJECTION, SOLUTION, CONCENTRATE INTRAVENOUS EVERY 5 MIN PRN
Status: CANCELLED | OUTPATIENT
Start: 2023-05-13

## 2023-05-11 RX ADMIN — IRON SUCROSE 300 MG: 20 INJECTION, SOLUTION INTRAVENOUS at 13:39

## 2023-05-11 NOTE — PROGRESS NOTES
Infusion Nursing Note:  Chema HOWARD Sulemacadenanna maries presents today for venofer infusion and labs.    Patient seen by provider today: No   present during visit today: Not Applicable.    Note: Pt here for 1st time venofer infusion and labs. Pt had many questions about iron levels and ongoing monitoring. Encouraged him speak with his MD. Pt has concerns about getting 3rd infusion done in the future as he will be out of the country for some time.       Intravenous Access:  Peripheral IV placed.    Treatment Conditions:  Not Applicable.      Post Infusion Assessment:  Patient tolerated infusion without incident.  Blood return noted pre and post infusion.  Site patent and intact, free from redness, edema or discomfort.  No evidence of extravasations.  Access discontinued per protocol.       Discharge Plan:   Discharge instructions reviewed with: Patient.  Patient and/or family verbalized understanding of discharge instructions and all questions answered.  Patient discharged in stable condition accompanied by: wife.  Departure Mode: Ambulatory.      Kate Chadwick RN

## 2023-05-15 ENCOUNTER — HOSPITAL ENCOUNTER (OUTPATIENT)
Facility: CLINIC | Age: 63
Discharge: HOME | End: 2023-05-15
Attending: INTERNAL MEDICINE | Admitting: INTERNAL MEDICINE
Payer: COMMERCIAL

## 2023-05-15 VITALS
DIASTOLIC BLOOD PRESSURE: 67 MMHG | OXYGEN SATURATION: 100 % | SYSTOLIC BLOOD PRESSURE: 102 MMHG | RESPIRATION RATE: 16 BRPM | HEART RATE: 73 BPM

## 2023-05-15 PROCEDURE — 91110 GI TRC IMG INTRAL ESOPH-ILE: CPT | Performed by: INTERNAL MEDICINE

## 2023-05-15 PROCEDURE — 250N000013 HC RX MED GY IP 250 OP 250 PS 637: Performed by: INTERNAL MEDICINE

## 2023-05-15 RX ORDER — SIMETHICONE 40MG/0.6ML
SUSPENSION, DROPS(FINAL DOSAGE FORM)(ML) ORAL PRN
Status: DISCONTINUED | OUTPATIENT
Start: 2023-05-15 | End: 2023-05-19 | Stop reason: HOSPADM

## 2023-05-15 ASSESSMENT — ACTIVITIES OF DAILY LIVING (ADL)
ADLS_ACUITY_SCORE: 35

## 2023-05-15 NOTE — OR NURSING
Procedure: Video Capsule Endoscopy  Sedation: None  Consent: Confirmed procedure consent as signed by patient    Pre-procedure Education:     Confirmed completion of bowel prep with pt/RN.    Pre-video capsule instructions reviewed with patient.     Printed materials given to pt for further review.     Pt voices questions answered to satisfaction prior to swallowing capsule.     Pt able to teach back NPO times and allowed foods/drinks.     Education included indication, possible benefits, possible risks, procedure, follow-up with particular attention paid to notation of passing capsule via BM.     Pt specifically instructed not to have MRI until confirmation that capsule has been passed.     MRI warning wristband applied. Pt states understanding to look for capsule when toileting.     Patient ID label placed on GI clipboard and Endoscopy Charge (inpatients)/ Book (outpatients & inpatients) clipboard for 24-hour equipment return - data download    Capsule Insertion:     Monitor with belt applied. Pt swallowed capsule at 8:30 am with 8 oz H2O + simethacone 2 ml added    Pt tolerated procedure well.    NPO Times:     NPO from ingestion of capsule until 10:30 am.     Clear fluids with PO meds after 10:30 am.    Light snack after 2:30 pm (6 hrs post ingestion).    Regular diet after 8:30 pm (12 hrs post ingestion).     Komal De Leon, RN, CGRN  [facility]

## 2023-05-16 ENCOUNTER — INFUSION THERAPY VISIT (OUTPATIENT)
Dept: INFUSION THERAPY | Facility: CLINIC | Age: 63
End: 2023-05-16
Attending: PHYSICIAN ASSISTANT
Payer: COMMERCIAL

## 2023-05-16 ENCOUNTER — TELEPHONE (OUTPATIENT)
Dept: ENDOCRINOLOGY | Facility: CLINIC | Age: 63
End: 2023-05-16

## 2023-05-16 DIAGNOSIS — D50.0 IRON DEFICIENCY ANEMIA DUE TO CHRONIC BLOOD LOSS: ICD-10-CM

## 2023-05-16 DIAGNOSIS — D50.8 OTHER IRON DEFICIENCY ANEMIA: Primary | ICD-10-CM

## 2023-05-16 LAB
FERRITIN SERPL-MCNC: 159 NG/ML (ref 31–409)
IRON BINDING CAPACITY (ROCHE): 357 UG/DL (ref 240–430)
IRON SATN MFR SERPL: 15 % (ref 15–46)
IRON SERPL-MCNC: 54 UG/DL (ref 61–157)

## 2023-05-16 PROCEDURE — 82728 ASSAY OF FERRITIN: CPT

## 2023-05-16 PROCEDURE — 36415 COLL VENOUS BLD VENIPUNCTURE: CPT

## 2023-05-16 PROCEDURE — 83550 IRON BINDING TEST: CPT

## 2023-05-16 PROCEDURE — 96366 THER/PROPH/DIAG IV INF ADDON: CPT

## 2023-05-16 PROCEDURE — 96365 THER/PROPH/DIAG IV INF INIT: CPT

## 2023-05-16 PROCEDURE — 250N000011 HC RX IP 250 OP 636: Performed by: PHYSICIAN ASSISTANT

## 2023-05-16 RX ORDER — ALBUTEROL SULFATE 90 UG/1
1-2 AEROSOL, METERED RESPIRATORY (INHALATION)
Status: CANCELLED
Start: 2023-05-17

## 2023-05-16 RX ORDER — ALBUTEROL SULFATE 0.83 MG/ML
2.5 SOLUTION RESPIRATORY (INHALATION)
Status: CANCELLED | OUTPATIENT
Start: 2023-05-17

## 2023-05-16 RX ORDER — METHYLPREDNISOLONE SODIUM SUCCINATE 125 MG/2ML
125 INJECTION, POWDER, LYOPHILIZED, FOR SOLUTION INTRAMUSCULAR; INTRAVENOUS
Status: CANCELLED
Start: 2023-05-17

## 2023-05-16 RX ORDER — EPINEPHRINE 1 MG/ML
0.3 INJECTION, SOLUTION, CONCENTRATE INTRAVENOUS EVERY 5 MIN PRN
Status: CANCELLED | OUTPATIENT
Start: 2023-05-17

## 2023-05-16 RX ORDER — DIPHENHYDRAMINE HYDROCHLORIDE 50 MG/ML
50 INJECTION INTRAMUSCULAR; INTRAVENOUS
Status: CANCELLED
Start: 2023-05-17

## 2023-05-16 RX ORDER — MEPERIDINE HYDROCHLORIDE 25 MG/ML
25 INJECTION INTRAMUSCULAR; INTRAVENOUS; SUBCUTANEOUS EVERY 30 MIN PRN
Status: CANCELLED | OUTPATIENT
Start: 2023-05-17

## 2023-05-16 RX ADMIN — IRON SUCROSE 300 MG: 20 INJECTION, SOLUTION INTRAVENOUS at 13:40

## 2023-05-16 ASSESSMENT — ACTIVITIES OF DAILY LIVING (ADL)
ADLS_ACUITY_SCORE: 35

## 2023-05-17 ASSESSMENT — ACTIVITIES OF DAILY LIVING (ADL)
ADLS_ACUITY_SCORE: 35

## 2023-05-18 ENCOUNTER — TELEPHONE (OUTPATIENT)
Dept: GASTROENTEROLOGY | Facility: CLINIC | Age: 63
End: 2023-05-18
Payer: COMMERCIAL

## 2023-05-18 LAB — VIDEO CAPSULE ENDOSCOPY: NORMAL

## 2023-05-18 ASSESSMENT — ACTIVITIES OF DAILY LIVING (ADL)
ADLS_ACUITY_SCORE: 35

## 2023-05-18 NOTE — TELEPHONE ENCOUNTER
[facility] Call Center    Phone Message    May a detailed message be left on voicemail: yes     Reason for Call: Other: Pt is calling in asking for a call back. Pt states that he would like to speak with Dr. Underwood if possible in regards to the results of his recent Capsule Endoscopy. Please call back to discuss.     Action Taken: Message routed to:  [facility] GI    Travel Screening: Not Applicable

## 2023-05-19 ENCOUNTER — TELEPHONE (OUTPATIENT)
Dept: GASTROENTEROLOGY | Facility: CLINIC | Age: 63
End: 2023-05-19
Payer: COMMERCIAL

## 2023-05-19 ASSESSMENT — ACTIVITIES OF DAILY LIVING (ADL)
ADLS_ACUITY_SCORE: 35

## 2023-05-19 NOTE — TELEPHONE ENCOUNTER
I called the patient early this afternoon to review the results of his VCE. He was very upset, frustrated and quite aggressive during our phone conversation.     He was unhappy that he did not receive a notification of his result yesterday when the results came in. For some reason the results actually did not result in my in-basket. I explained this to the patient and apologized for any delay. I proceeded to explain the results to him. VCE findings note mucosal abn in the distal duodenum and proximal jejunum and the recommendation was for push enteroscopy and celiac screening labs. I actually did previously order celiac screening labs in February 2023 at initial consult however it was not completed. I unfortunately didn't get to even discuss checking celiac serologies with him because he proceeded to tell me that celiac disease was already ruled out by duodenal bx on EGD.      I explained area of concern is further into the duodenum and was not visualized by EGD. He told me I was just reading off of the reports and he would like this findings to be confirmed with the physician.     I advised that I would forward his concerns to reading physician Dr. Underwood. He also requested having Dr. Bro (endoscopist who performed his recent EGDs) review these results. I have sent a high priority staff message to both physicians requesting their attention to this matter.     I think it would be best for patient to have any future follow up visits with one of our GI MD/DO.     Jennifer Mclain PA-C  Division of Gastroenterology, Hepatology and Nutrition   [facility]

## 2023-05-19 NOTE — TELEPHONE ENCOUNTER
" Health Call Center    Phone Message    May a detailed message be left on voicemail: yes     Reason for Call: Other: Pt is calling in asking to get a message to his care team. Pt states that he \"would like to speak with a supervising MD\" in regards to the results of his capsule endoscopy ordered by Jennifer Mclain. Please respond accordingly.     Action Taken: Message routed to:  [facility] ([facility] GI    Travel Screening: Not Applicable                                                                        "

## 2023-05-19 NOTE — TELEPHONE ENCOUNTER
Spoke to Chema Bear would like a result note for recent Capsule Endoscopy  States is leaving the country early next week for 2 months and is hoping to have results before then.     Routed to Dr. Underwood

## 2023-05-21 NOTE — TELEPHONE ENCOUNTER
5/21/2023-Request for images faxed to [facility] @ 555am    Records Requested     June 21, 2023 8:54 AM   [id number]   Facility  [facility]   Outcome 8:57am Sent 2nd request for imaging to be pushed to PACS. -SARITHA Hardy on 7/12/2023 at 8:59 AM Imaging resolved into PACS. -SARITHA       RECORDS RECEIVED FROM: Care Everywhere   REASON FOR VISIT: Word Finding Difficulty    Date of Appt: 8/2/2023   NOTES (FOR ALL VISITS) STATUS DETAILS   OFFICE NOTE from referring provider Internal  2/10/23 Cheri Watson MD @ [facility]End[facility]     OFFICE NOTE from other specialist Care Everywhere 4/14/23, 3/27/23 [facility]    2/8/23 Jocelin Hu MD @[facility]     MEDICATION LIST Internal     IMAGING  (FOR ALL VISITS)     PET PACS [facility]  3/31/23  PET CT Brain Metabolic Eval     MRI (HEAD, NECK, SPINE) PACS [facility]  3/31/23  MR Brain Dementia

## 2023-05-22 ENCOUNTER — LAB (OUTPATIENT)
Dept: LAB | Facility: CLINIC | Age: 63
End: 2023-05-22
Payer: COMMERCIAL

## 2023-05-22 DIAGNOSIS — D50.0 IRON DEFICIENCY ANEMIA DUE TO CHRONIC BLOOD LOSS: ICD-10-CM

## 2023-05-22 DIAGNOSIS — E11.9 TYPE 2 DIABETES MELLITUS WITHOUT COMPLICATION, WITHOUT LONG-TERM CURRENT USE OF INSULIN (H): ICD-10-CM

## 2023-05-22 DIAGNOSIS — D50.9 IRON DEFICIENCY ANEMIA, UNSPECIFIED IRON DEFICIENCY ANEMIA TYPE: ICD-10-CM

## 2023-05-22 DIAGNOSIS — D64.9 ANEMIA, UNSPECIFIED TYPE: ICD-10-CM

## 2023-05-22 LAB
ANION GAP SERPL CALCULATED.3IONS-SCNC: 11 MMOL/L (ref 7–15)
BASOPHILS # BLD AUTO: 0 10E3/UL (ref 0–0.2)
BASOPHILS NFR BLD AUTO: 1 %
BUN SERPL-MCNC: 14.5 MG/DL (ref 8–23)
CALCIUM SERPL-MCNC: 10.8 MG/DL (ref 8.8–10.2)
CHLORIDE SERPL-SCNC: 100 MMOL/L (ref 98–107)
CHOLEST SERPL-MCNC: 155 MG/DL
CREAT SERPL-MCNC: 0.83 MG/DL (ref 0.67–1.17)
CREAT UR-MCNC: 49.3 MG/DL
DEPRECATED HCO3 PLAS-SCNC: 27 MMOL/L (ref 22–29)
EOSINOPHIL # BLD AUTO: 0.1 10E3/UL (ref 0–0.7)
EOSINOPHIL NFR BLD AUTO: 2 %
ERYTHROCYTE [DISTWIDTH] IN BLOOD BY AUTOMATED COUNT: 15.1 % (ref 10–15)
FERRITIN SERPL-MCNC: 200 NG/ML (ref 31–409)
GFR SERPL CREATININE-BSD FRML MDRD: >90 ML/MIN/1.73M2
GLUCOSE SERPL-MCNC: 107 MG/DL (ref 70–99)
HBA1C MFR BLD: 7.8 %
HCT VFR BLD AUTO: 40 % (ref 40–53)
HDLC SERPL-MCNC: 44 MG/DL
HGB BLD-MCNC: 12.8 G/DL (ref 13.3–17.7)
IMM GRANULOCYTES # BLD: 0 10E3/UL
IMM GRANULOCYTES NFR BLD: 0 %
IRON BINDING CAPACITY (ROCHE): 345 UG/DL (ref 240–430)
IRON SATN MFR SERPL: 30 % (ref 15–46)
IRON SERPL-MCNC: 105 UG/DL (ref 61–157)
LDLC SERPL CALC-MCNC: 91 MG/DL
LYMPHOCYTES # BLD AUTO: 1.9 10E3/UL (ref 0.8–5.3)
LYMPHOCYTES NFR BLD AUTO: 39 %
MCH RBC QN AUTO: 26.1 PG (ref 26.5–33)
MCHC RBC AUTO-ENTMCNC: 32 G/DL (ref 31.5–36.5)
MCV RBC AUTO: 82 FL (ref 78–100)
MICROALBUMIN UR-MCNC: <12 MG/L
MICROALBUMIN/CREAT UR: NORMAL MG/G{CREAT}
MONOCYTES # BLD AUTO: 0.5 10E3/UL (ref 0–1.3)
MONOCYTES NFR BLD AUTO: 9 %
NEUTROPHILS # BLD AUTO: 2.5 10E3/UL (ref 1.6–8.3)
NEUTROPHILS NFR BLD AUTO: 49 %
NONHDLC SERPL-MCNC: 111 MG/DL
NRBC # BLD AUTO: 0 10E3/UL
NRBC BLD AUTO-RTO: 0 /100
PLATELET # BLD AUTO: 196 10E3/UL (ref 150–450)
POTASSIUM SERPL-SCNC: 4 MMOL/L (ref 3.4–5.3)
RBC # BLD AUTO: 4.9 10E6/UL (ref 4.4–5.9)
SODIUM SERPL-SCNC: 138 MMOL/L (ref 136–145)
TRIGL SERPL-MCNC: 98 MG/DL
TSH SERPL DL<=0.005 MIU/L-ACNC: 0.74 UIU/ML (ref 0.3–4.2)
VIT B12 SERPL-MCNC: 749 PG/ML (ref 232–1245)
WBC # BLD AUTO: 5 10E3/UL (ref 4–11)

## 2023-05-22 PROCEDURE — 85025 COMPLETE CBC W/AUTO DIFF WBC: CPT | Performed by: PATHOLOGY

## 2023-05-22 PROCEDURE — 80048 BASIC METABOLIC PNL TOTAL CA: CPT | Performed by: PATHOLOGY

## 2023-05-22 PROCEDURE — 82784 ASSAY IGA/IGD/IGG/IGM EACH: CPT | Mod: 90 | Performed by: PATHOLOGY

## 2023-05-22 PROCEDURE — 99000 SPECIMEN HANDLING OFFICE-LAB: CPT | Performed by: PATHOLOGY

## 2023-05-22 PROCEDURE — 83540 ASSAY OF IRON: CPT | Performed by: PATHOLOGY

## 2023-05-22 PROCEDURE — 86364 TISS TRNSGLTMNASE EA IG CLAS: CPT | Mod: 90 | Performed by: PATHOLOGY

## 2023-05-22 PROCEDURE — 82607 VITAMIN B-12: CPT | Mod: 90 | Performed by: PATHOLOGY

## 2023-05-22 PROCEDURE — 82728 ASSAY OF FERRITIN: CPT | Performed by: PATHOLOGY

## 2023-05-22 PROCEDURE — 82570 ASSAY OF URINE CREATININE: CPT | Mod: 90 | Performed by: PATHOLOGY

## 2023-05-22 PROCEDURE — 82043 UR ALBUMIN QUANTITATIVE: CPT | Mod: 90 | Performed by: PATHOLOGY

## 2023-05-22 PROCEDURE — 36415 COLL VENOUS BLD VENIPUNCTURE: CPT | Performed by: PATHOLOGY

## 2023-05-22 PROCEDURE — 83550 IRON BINDING TEST: CPT | Performed by: PATHOLOGY

## 2023-05-22 PROCEDURE — 83036 HEMOGLOBIN GLYCOSYLATED A1C: CPT | Mod: 90 | Performed by: PATHOLOGY

## 2023-05-22 PROCEDURE — 80061 LIPID PANEL: CPT | Performed by: PATHOLOGY

## 2023-05-22 PROCEDURE — 84443 ASSAY THYROID STIM HORMONE: CPT | Performed by: PATHOLOGY

## 2023-05-23 LAB — IGA SERPL-MCNC: 203 MG/DL (ref 84–499)

## 2023-05-26 ENCOUNTER — TELEPHONE (OUTPATIENT)
Dept: ENDOCRINOLOGY | Facility: CLINIC | Age: 63
End: 2023-05-26
Payer: COMMERCIAL

## 2023-05-26 DIAGNOSIS — E11.9 TYPE 2 DIABETES MELLITUS WITHOUT COMPLICATION, WITHOUT LONG-TERM CURRENT USE OF INSULIN (H): Primary | ICD-10-CM

## 2023-05-26 LAB
TTG IGA SER-ACNC: 0.5 U/ML
TTG IGG SER-ACNC: 0.7 U/ML

## 2023-06-01 ENCOUNTER — TELEPHONE (OUTPATIENT)
Dept: ENDOCRINOLOGY | Facility: CLINIC | Age: 63
End: 2023-06-01
Payer: COMMERCIAL

## 2023-06-01 NOTE — TELEPHONE ENCOUNTER
Communication Summary: Spoke to patient and scheduled labs, unable to schedule MRI due to telephone issues     Appointment type: Labs  Provider: N/A  Return date: 08/01/2023  Speciality phone number: [phone]  Additional appointment(s) needed: MRI   Additional notes: N/A    Jaime Gunn on 6/1/2023 at 8:41 AM

## 2023-07-27 NOTE — PROGRESS NOTES
Outcome for 07/27/23 9:26 AM: [employer] message sent  Jodi Mills MA  Outcome for 07/28/23 7:29 AM: Patient is not checking blood sugars  Jodi Mills MA    Patient is showing 4/5 MNCM met. Aspirin not prescribed   Jodi Mills MA

## 2023-07-31 ENCOUNTER — INFUSION THERAPY VISIT (OUTPATIENT)
Dept: INFUSION THERAPY | Facility: CLINIC | Age: 63
End: 2023-07-31
Attending: PHYSICIAN ASSISTANT
Payer: COMMERCIAL

## 2023-07-31 VITALS — DIASTOLIC BLOOD PRESSURE: 74 MMHG | TEMPERATURE: 98.5 F | SYSTOLIC BLOOD PRESSURE: 114 MMHG | HEART RATE: 76 BPM

## 2023-07-31 DIAGNOSIS — D50.8 OTHER IRON DEFICIENCY ANEMIA: Primary | ICD-10-CM

## 2023-07-31 DIAGNOSIS — D50.0 IRON DEFICIENCY ANEMIA DUE TO CHRONIC BLOOD LOSS: ICD-10-CM

## 2023-07-31 LAB
FERRITIN SERPL-MCNC: 22 NG/ML (ref 31–409)
IRON BINDING CAPACITY (ROCHE): 367 UG/DL (ref 240–430)
IRON SATN MFR SERPL: 26 % (ref 15–46)
IRON SERPL-MCNC: 95 UG/DL (ref 61–157)

## 2023-07-31 PROCEDURE — 82728 ASSAY OF FERRITIN: CPT

## 2023-07-31 PROCEDURE — 83550 IRON BINDING TEST: CPT

## 2023-07-31 PROCEDURE — 96365 THER/PROPH/DIAG IV INF INIT: CPT

## 2023-07-31 PROCEDURE — 250N000011 HC RX IP 250 OP 636: Mod: JZ | Performed by: PHYSICIAN ASSISTANT

## 2023-07-31 PROCEDURE — 258N000003 HC RX IP 258 OP 636: Performed by: PHYSICIAN ASSISTANT

## 2023-07-31 PROCEDURE — 36415 COLL VENOUS BLD VENIPUNCTURE: CPT

## 2023-07-31 PROCEDURE — 96366 THER/PROPH/DIAG IV INF ADDON: CPT

## 2023-07-31 RX ORDER — DIPHENHYDRAMINE HYDROCHLORIDE 50 MG/ML
50 INJECTION INTRAMUSCULAR; INTRAVENOUS
Status: CANCELLED
Start: 2023-07-31

## 2023-07-31 RX ORDER — ALBUTEROL SULFATE 0.83 MG/ML
2.5 SOLUTION RESPIRATORY (INHALATION)
Status: CANCELLED | OUTPATIENT
Start: 2023-07-31

## 2023-07-31 RX ORDER — ALBUTEROL SULFATE 90 UG/1
1-2 AEROSOL, METERED RESPIRATORY (INHALATION)
Status: CANCELLED
Start: 2023-07-31

## 2023-07-31 RX ORDER — METHYLPREDNISOLONE SODIUM SUCCINATE 125 MG/2ML
125 INJECTION, POWDER, LYOPHILIZED, FOR SOLUTION INTRAMUSCULAR; INTRAVENOUS
Status: CANCELLED
Start: 2023-07-31

## 2023-07-31 RX ORDER — MEPERIDINE HYDROCHLORIDE 25 MG/ML
25 INJECTION INTRAMUSCULAR; INTRAVENOUS; SUBCUTANEOUS EVERY 30 MIN PRN
Status: CANCELLED | OUTPATIENT
Start: 2023-07-31

## 2023-07-31 RX ORDER — EPINEPHRINE 1 MG/ML
0.3 INJECTION, SOLUTION, CONCENTRATE INTRAVENOUS EVERY 5 MIN PRN
Status: CANCELLED | OUTPATIENT
Start: 2023-07-31

## 2023-07-31 RX ORDER — OMEGA-3/DHA/EPA/FISH OIL 60 MG-90MG
500 CAPSULE ORAL DAILY
COMMUNITY

## 2023-07-31 RX ADMIN — IRON SUCROSE 300 MG: 20 INJECTION, SOLUTION INTRAVENOUS at 10:31

## 2023-07-31 ASSESSMENT — PAIN SCALES - GENERAL: PAINLEVEL: NO PAIN (0)

## 2023-07-31 NOTE — PROGRESS NOTES
Infusion Nursing Note:  Chema Mccullough presents today for venofer 300 mg 3/3.    Patient seen by provider today: No   present during visit today: Not Applicable.    Note: N/A.      Intravenous Access:  Labs drawn without difficulty.  Peripheral IV placed.    Treatment Conditions:  Not Applicable.      Post Infusion Assessment:  Patient tolerated infusion without incident.  Blood return noted pre and post infusion.  Site patent and intact, free from redness, edema or discomfort.  No evidence of extravasations.  Access discontinued per protocol.       Discharge Plan:   Patient discharged in stable condition accompanied by: self.  Departure Mode: Ambulatory.  No further infusions planned at this time.      Kirsten Mcknight

## 2023-08-02 ENCOUNTER — PRE VISIT (OUTPATIENT)
Dept: NEUROLOGY | Facility: CLINIC | Age: 63
End: 2023-08-02

## 2023-08-02 ENCOUNTER — LAB (OUTPATIENT)
Dept: LAB | Facility: CLINIC | Age: 63
End: 2023-08-02
Payer: COMMERCIAL

## 2023-08-02 ENCOUNTER — OFFICE VISIT (OUTPATIENT)
Dept: NEUROLOGY | Facility: CLINIC | Age: 63
End: 2023-08-02
Attending: INTERNAL MEDICINE
Payer: COMMERCIAL

## 2023-08-02 ENCOUNTER — PATIENT OUTREACH (OUTPATIENT)
Dept: GASTROENTEROLOGY | Facility: CLINIC | Age: 63
End: 2023-08-02

## 2023-08-02 VITALS
WEIGHT: 167 LBS | HEART RATE: 104 BPM | DIASTOLIC BLOOD PRESSURE: 67 MMHG | BODY MASS INDEX: 23.96 KG/M2 | OXYGEN SATURATION: 95 % | SYSTOLIC BLOOD PRESSURE: 107 MMHG

## 2023-08-02 DIAGNOSIS — E11.9 TYPE 2 DIABETES MELLITUS WITHOUT COMPLICATION, WITHOUT LONG-TERM CURRENT USE OF INSULIN (H): ICD-10-CM

## 2023-08-02 DIAGNOSIS — R47.89 WORD FINDING DIFFICULTY: ICD-10-CM

## 2023-08-02 DIAGNOSIS — R45.82 FEELING WORRIED: Primary | ICD-10-CM

## 2023-08-02 DIAGNOSIS — D50.0 IRON DEFICIENCY ANEMIA DUE TO CHRONIC BLOOD LOSS: ICD-10-CM

## 2023-08-02 LAB
ANION GAP SERPL CALCULATED.3IONS-SCNC: 10 MMOL/L (ref 7–15)
BUN SERPL-MCNC: 13.4 MG/DL (ref 8–23)
CALCIUM SERPL-MCNC: 10.2 MG/DL (ref 8.8–10.2)
CHLORIDE SERPL-SCNC: 103 MMOL/L (ref 98–107)
CREAT SERPL-MCNC: 0.79 MG/DL (ref 0.67–1.17)
DEPRECATED HCO3 PLAS-SCNC: 25 MMOL/L (ref 22–29)
FERRITIN SERPL-MCNC: 358 NG/ML (ref 31–409)
GFR SERPL CREATININE-BSD FRML MDRD: >90 ML/MIN/1.73M2
GLUCOSE SERPL-MCNC: 153 MG/DL (ref 70–99)
HOLD SPECIMEN: NORMAL
HOLD SPECIMEN: NORMAL
IRON BINDING CAPACITY (ROCHE): 350 UG/DL (ref 240–430)
IRON SATN MFR SERPL: 34 % (ref 15–46)
IRON SERPL-MCNC: 118 UG/DL (ref 61–157)
POTASSIUM SERPL-SCNC: 4.4 MMOL/L (ref 3.4–5.3)
SODIUM SERPL-SCNC: 138 MMOL/L (ref 136–145)

## 2023-08-02 PROCEDURE — 83540 ASSAY OF IRON: CPT | Performed by: PATHOLOGY

## 2023-08-02 PROCEDURE — 36415 COLL VENOUS BLD VENIPUNCTURE: CPT | Performed by: PATHOLOGY

## 2023-08-02 PROCEDURE — 99205 OFFICE O/P NEW HI 60 MIN: CPT | Performed by: PSYCHIATRY & NEUROLOGY

## 2023-08-02 PROCEDURE — 83550 IRON BINDING TEST: CPT | Performed by: PATHOLOGY

## 2023-08-02 PROCEDURE — 80048 BASIC METABOLIC PNL TOTAL CA: CPT | Performed by: PATHOLOGY

## 2023-08-02 PROCEDURE — 82728 ASSAY OF FERRITIN: CPT | Performed by: PATHOLOGY

## 2023-08-02 ASSESSMENT — PAIN SCALES - GENERAL: PAINLEVEL: NO PAIN (0)

## 2023-08-02 NOTE — PROGRESS NOTES
"[facility] Neurology Consultation    Chema Mccullough MRN# [id number]   Age: 63 year old YOB: 1960     Requesting physician: Jocelin Sanford     Reason for Consultation: word finding difficulties      History of Presenting Symptoms:   Chema Mccullough is a 63 year old male who presents today for evaluation of word finding difficulties.  The patient has a pertinent medical history of HLD, DMII, and iron deficiency anemia.     The patient was seen with his endocrinologist 2/10/2023 through a virtual visit and the patient's wife reported he had difficulty finding words to explain his thoughts.  MRI brain and neurology consultation was ordered.  Mri brain 3/31/2023 was done through [facility] providers noting normal hippocampal volume, and no specific atrophy patterns or signs of infarction, or regional encephalomalacia.  A PET CT brain was done 3/31/2023 and showed mild decreased FDG uptake in b/l temporal lobes, b/l parietal lobes, b/l frontal lobes and b/l precuneus but normal uptake in the sensorimotor cortex basal ganglia, thalamus b/l, and cerebellum.  He followed up with Neurology providers through [facility] 4/14/2023 where it was noted his clinical picture didn't match certain dementias, and that his FDG-PET was only with subtle abnormalities seen at times with DLB.  He was to trial Aricept, and obtain neuropsychology testing in one year given his stated exam earlier in the year was largely within normal limits (in the context that english was not his first language).    Today, the patient is here alone.  He mentions that his colleague, Dr. Watson, noticed that he was \"looking for words\" during some video visits.  This led to his above mentioned w/up with [place].  He has yet to try donepezil due to concern for GI symptoms.  He is using melatonin and this has helped with sleep.      He tells me his family and friends tell him that he always is looking for words when getting " "back from [place] as he is bilingual.  They told him it is a common occurrence for him, and has been happening for years.  His family/wife has noted he may be more worried than he usually has been in the past.  Recently, the patient feels his \"mindset\" is off.  He is second guessing himself, and is often worried near immediately if he cannot recall a word quickly. He is asking himself \"is this normal or not\".  He doesn't feel he has any progression in terms of symptoms, as word finding is about the only symptom he mentions.  He often is self testing himself at this time to see if he can complete memory tasks (trying to remember world cup soccer team members).     He doesn't feel people have mentioned his work quality being worse.  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       He doesn't feel he acts out his dreams.  He hasn't had behavioral changes consistent with increased gambling/risky behaviors, or inappropriate comments (sexual, not according to social convention).  He doesn't have issues related to passing out.  He is not slowing down for any real reason, and feels relatively healthy (even lost weight over the pandemic).   He often finds that he double checks if he locked the doors (office door, home).       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        Medications:     Current Outpatient Medications   Medication    bisacodyl (DULCOLAX) 5 MG EC tablet    bisacodyl (DULCOLAX) 5 MG EC tablet    empagliflozin (JARDIANCE) 10 MG TABS tablet    fish oil-omega-3 fatty acids 500 MG capsule    melatonin 1 MG TABS tablet    metFORMIN (GLUCOPHAGE) 1000 MG tablet    omeprazole (PRILOSEC) 20 MG DR capsule    rosuvastatin (CRESTOR) 10 MG tablet      Physical Exam:   Vitals: /67 (BP Location: Right arm, Patient " Position: Sitting, Cuff Size: Adult Regular)   Pulse 104   Wt 75.8 kg (167 lb)   SpO2 95%   BMI 23.96 kg/m     General: Seated comfortably in no acute distress.  Slightly anxious, accurate historian, well spoken.   HEENT: Neck supple with normal range of motion.   Skin: No rashes  Neurologic:     Mental Status: Fully alert, attentive and oriented. Speech clear and fluent, no paraphasic errors. MiniCOG was 5/5 (3/3 recall at 5 and 15 minutes. Clock appropriate). Cube copy correct. Serial 7's without errors. Names over 19 words in one minute with one repeat.       Cranial Nerves: Visual fields intact to threat.  EOMI with normal smooth pursuit. Facial movements symmetric. Hearing not formally tested but intact to conversation.      Motor: No tremors or other abnormal movements observed. Muscle tone normal throughout. No pronator drift. Normal/symmetric rapid finger tapping. Strength 5/5 throughout upper and lower extremities.     Deep Tendon Reflexes: 2+/symmetric throughout upper and lower extremities (brisk throughout but no clonus or spread). Toes downgoing bilaterally.     Sensory: Intact/symmetric to light touch throughout upper and lower extremities. Negative Romberg.      Coordination: Finger-nose-finger intact without dysmetria. Rapid alternating movements intact/symmetric with normal speed and rhythm.     Gait: Normal, steady casual gait. Able to walk on toes, heels and tandem without difficulty.         Data: Pertinent prior to visit   Imaging:  Reviewed as above         Assessment and Plan:   Assessment:  Subjective cognitive impairment     The patient has had advanced testing which was equivocal in terms of FDG-PET findings, as well as normal neuropsychology testing for his issues of word production.  At this point, given his high intellect, it is possible he has a beginning stage cognitive impairment but it just may not be able to be quantified by any reasonable means. He doesn't have a clinical  picture for parkinsonism or DLB at this point in terms of possible REM behavior sleep disorder or visual hallucinations.  His cognitive issues are not leading to functional decline by description. His issues of naming may be from switching languages.  At this point, we discussed that repeating testing in one year seems reasonable to look for early signs of cognitive change or atrophy.  We also discussed that his anxiety about having some degree of cognitive difficulties (real or not) should be addressed through psychotherapy.  I didn't necessarily feel that taking donepezil would be helpful for him given his limited findings on testing so far and his apprehension towards side-effects or the utility of the drug.     Plan:  - Psychology referral  - Continue with Melatonin as you are    Follow up in Neurology clinic in one year, or should new concerns arise.    BIMAL Clayton D.O.   of Neurology    Total time today (85 min) in this patient encounter was spent on pre-charting, counseling and/or coordination of care.  The patient is in agreement with this plan and has no further questions.

## 2023-08-02 NOTE — TELEPHONE ENCOUNTER
[facility] reached out via the [facility] line, states patient was in having labs drawn today, ordered by Jennifer Mclain PA-C. Pt requesting additional labs be added to samples; A-1C,Lipid, and hemoglobin.  [facility] did reach out to endo who declined added on these orders.   Pt is scheduled for a visit with Endo tomorrow.  Please let lab know if you want to add the labs on- [phone]

## 2023-08-02 NOTE — PROGRESS NOTES
Call to lab to update that GI provider will not ok adding on a A1c, lipids or hemoglobin. Patient is seeing endo tomorrow.    Mary Mccormack RN

## 2023-08-02 NOTE — LETTER
8/2/2023       RE: Chema Mccullough  [address] [place] [address]     Dear Colleague,    Thank you for referring your patient, Chema Mccullough, to the [facility] NEUROLOG[facility] [place] at [facility]. Please see a copy of my visit note below.    [facility] Neurology Consultation    Chema Mccullough MRN# [id number]   Age: 63 year old YOB: 1960     Requesting physician: Jocelin Sanford     Reason for Consultation: word finding difficulties      History of Presenting Symptoms:   Chema Mccullough is a 63 year old male who presents today for evaluation of word finding difficulties.  The patient has a pertinent medical history of HLD, DMII, and iron deficiency anemia.     The patient was seen with his endocrinologist 2/10/2023 through a virtual visit and the patient's wife reported he had difficulty finding words to explain his thoughts.  MRI brain and neurology consultation was ordered.  Mri brain 3/31/2023 was done through [facility] providers noting normal hippocampal volume, and no specific atrophy patterns or signs of infarction, or regional encephalomalacia.  A PET CT brain was done 3/31/2023 and showed mild decreased FDG uptake in b/l temporal lobes, b/l parietal lobes, b/l frontal lobes and b/l precuneus but normal uptake in the sensorimotor cortex basal ganglia, thalamus b/l, and cerebellum.  He followed up with Neurology providers through [facility] 4/14/2023 where it was noted his clinical picture didn't match certain dementias, and that his FDG-PET was only with subtle abnormalities seen at times with DLB.  He was to trial Aricept, and obtain neuropsychology testing in one year given his stated exam earlier in the year was largely within normal limits (in the context that english was not his first language).    Today, the patient is here alone.  He mentions that his colleague, Dr. Watson,  "noticed that he was \"looking for words\" during some video visits.  This led to his above mentioned w/up with [place].  He has yet to try donepezil due to concern for GI symptoms.  He is using melatonin and this has helped with sleep.      He tells me his family and friends tell him that he always is looking for words when getting back from [place] as he is bilingual.  They told him it is a common occurrence for him, and has been happening for years.  His family/wife has noted he may be more worried than he usually has been in the past.  Recently, the patient feels his \"mindset\" is off.  He is second guessing himself, and is often worried near immediately if he cannot recall a word quickly. He is asking himself \"is this normal or not\".  He doesn't feel he has any progression in terms of symptoms, as word finding is about the only symptom he mentions.  He often is self testing himself at this time to see if he can complete memory tasks (trying to remember world cup soccer team members).     He doesn't feel people have mentioned his work quality being worse.  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       He doesn't feel he acts out his dreams.  He hasn't had behavioral changes consistent with increased gambling/risky behaviors, or inappropriate comments (sexual, not according to social convention).  He doesn't have issues related to passing out.  He is not slowing down for any real reason, and feels relatively healthy (even lost weight over the pandemic).   He often finds that he double checks if he locked the doors (office door, home).       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        Medications:     Current Outpatient Medications   Medication    bisacodyl (DULCOLAX) 5 MG EC tablet    bisacodyl (DULCOLAX) 5 MG EC " tablet    empagliflozin (JARDIANCE) 10 MG TABS tablet    fish oil-omega-3 fatty acids 500 MG capsule    melatonin 1 MG TABS tablet    metFORMIN (GLUCOPHAGE) 1000 MG tablet    omeprazole (PRILOSEC) 20 MG DR capsule    rosuvastatin (CRESTOR) 10 MG tablet      Physical Exam:   Vitals: /67 (BP Location: Right arm, Patient Position: Sitting, Cuff Size: Adult Regular)   Pulse 104   Wt 75.8 kg (167 lb)   SpO2 95%   BMI 23.96 kg/m     General: Seated comfortably in no acute distress.  Slightly anxious, accurate historian, well spoken.   HEENT: Neck supple with normal range of motion.   Skin: No rashes  Neurologic:     Mental Status: Fully alert, attentive and oriented. Speech clear and fluent, no paraphasic errors. MiniCOG was 5/5 (3/3 recall at 5 and 15 minutes. Clock appropriate). Cube copy correct. Serial 7's without errors. Names over 19 words in one minute with one repeat.       Cranial Nerves: Visual fields intact to threat.  EOMI with normal smooth pursuit. Facial movements symmetric. Hearing not formally tested but intact to conversation.      Motor: No tremors or other abnormal movements observed. Muscle tone normal throughout. No pronator drift. Normal/symmetric rapid finger tapping. Strength 5/5 throughout upper and lower extremities.     Deep Tendon Reflexes: 2+/symmetric throughout upper and lower extremities (brisk throughout but no clonus or spread). Toes downgoing bilaterally.     Sensory: Intact/symmetric to light touch throughout upper and lower extremities. Negative Romberg.      Coordination: Finger-nose-finger intact without dysmetria. Rapid alternating movements intact/symmetric with normal speed and rhythm.     Gait: Normal, steady casual gait. Able to walk on toes, heels and tandem without difficulty.         Data: Pertinent prior to visit   Imaging:  Reviewed as above         Assessment and Plan:   Assessment:  Subjective cognitive impairment     The patient has had advanced testing which  was equivocal in terms of FDG-PET findings, as well as normal neuropsychology testing for his issues of word production.  At this point, given his high intellect, it is possible he has a beginning stage cognitive impairment but it just may not be able to be quantified by any reasonable means. He doesn't have a clinical picture for parkinsonism or DLB at this point in terms of possible REM behavior sleep disorder or visual hallucinations.  His cognitive issues are not leading to functional decline by description. His issues of naming may be from switching languages.  At this point, we discussed that repeating testing in one year seems reasonable to look for early signs of cognitive change or atrophy.  We also discussed that his anxiety about having some degree of cognitive difficulties (real or not) should be addressed through psychotherapy.  I didn't necessarily feel that taking donepezil would be helpful for him given his limited findings on testing so far and his apprehension towards side-effects or the utility of the drug.     Plan:  - Psychology referral  - Continue with Melatonin as you are    Follow up in Neurology clinic in one year, or should new concerns arise.      Total time today (85 min) in this patient encounter was spent on pre-charting, counseling and/or coordination of care.  The patient is in agreement with this plan and has no further questions.      Again, thank you for allowing me to participate in the care of your patient.      Sincerely,    Gallo Clayton, DO

## 2023-08-02 NOTE — PATIENT INSTRUCTIONS
Your overall clinical picture is that of subjective cognitive impairment, but this issue could be extremely slanted in terms of diagnosis given your high intellect.  At this point, the only real positive finding is that of the FDG-PET scan, which at best could be limited from your diabetes and doesn't match that of your clinical exam (no signs of parkinsonism, no sign of clinical picture for Lewy body dementia).      I would recommend repeating neuropsychology testing in one year, and seeing if any changes are noted. If there are, then consideration CSF testing ([facility] send out test) and repeat PET-CT could be made.    In the meantime, please consider psychotherapy as this may help your worry about the situation you are in at the moment.

## 2023-08-03 ENCOUNTER — VIRTUAL VISIT (OUTPATIENT)
Dept: ENDOCRINOLOGY | Facility: CLINIC | Age: 63
End: 2023-08-03
Payer: COMMERCIAL

## 2023-08-03 DIAGNOSIS — E11.9 TYPE 2 DIABETES MELLITUS WITHOUT COMPLICATION, WITHOUT LONG-TERM CURRENT USE OF INSULIN (H): Primary | ICD-10-CM

## 2023-08-03 DIAGNOSIS — K92.2 GASTROINTESTINAL HEMORRHAGE, UNSPECIFIED GASTROINTESTINAL HEMORRHAGE TYPE: ICD-10-CM

## 2023-08-03 PROCEDURE — 99215 OFFICE O/P EST HI 40 MIN: CPT | Mod: VID | Performed by: INTERNAL MEDICINE

## 2023-08-03 NOTE — NURSING NOTE
Patient denies any changes since echeck-in regarding medication and allergies and states all information entered during echeck-in remains accurate.    Is the patient currently in the state of [place]? YES    Visit mode:VIDEO    If the visit is dropped, the patient can be reconnected by: VIDEO VISIT: Text to cell phone: [phone]    Will anyone else be joining the visit? NO      How would you like to obtain your AVS? MyChart    Are changes needed to the allergy or medication list? NO    Reason for visit: RECHECK

## 2023-08-03 NOTE — PROGRESS NOTES
Video-Visit Details    Type of service:  Video Visit    Virtual visit conducted by [facility].      Originating Location (pt. Location): OFFICE    Distant Location (provider location):  Off site    Mode of Communication:  Video Conference via [employer]    Physician has received verbal consent for a Video Visit from the patient? YES    8/3/23    Start time 9:00, stop time 925, chart review, documentation time, coordination of care, 15 minutes.  Total time spent 80 encounter 40 minutes.    [facility]  Endocrinology  Cheri Watson MD    Chief Complaint:   Diabetes    History of Present Illness:   Chema Mccullough is a 63  year old male with a history of type 2 diabetes and goiter who presents for follow up evaluation    #1 Diabetes mellitus type 2  The patient was diagnosed with prediabetes in 2004 and then in 2009 found to have fasting blood sugar of 184 with A1c of 7.5%.  He was started on Metformin XR in February 2009, dose increased to 500 mg twice daily in March 2013.  A1c from 2013 - 2016 was consistently in the 8% range.  November 2015 evaluation significant for detectable C-peptide of 3.2.  June 2016 his Metformin was increased to 2000 mg daily.  December 2017 he was started on Amaryl 1 mg daily as he was experiencing postprandial hyperglycemia. A1c between April 2018-October 2018 consistently in 6% range. August 2018 PSA was 0.8, creatinine was 0.75, hemoglobin A1c of 6.6, urine for microalbumin was negative, and LDL was 78.  August 2019 hemoglobin A1c was 6.9.  During his previous visit on 8/16/19 SGLT-2 inhibitors and GLP-1 agonists were discussed, but the patient was hesitant to start them as they were new.  August 2019 hemoglobin A1c of 6.9.  February 2020 hemoglobin A1c is 7.1.    In February 2020, I had the patient try Jardiance at 10 mg daily and stop his glimepiride.   July 2020 hemoglobin A1c is 6.8, LDL 68, TSH 0.85.December 2020 urine for protein was negative, creatinine was 0.82, hemoglobin A1c  of 6.9, LDL was 80.   August 2021 LDL was 73, August 2021 when hemoglobin A1c 6.7, TSH 0.73  December 2021 hemoglobin A1c at 7.4, March 2022 hemoglobin A1c of 7.1, July 2022 hemoglobin A1c at 7.0.     February 2023 hemoglobin A1c at 7.6.  Potassium 4.4, creatinine 0.79, LDL was 101, HDL was 41, triglycerides 98    Interval history: The patient is currently on Crestor at 20 mg daily.  In May 2023, hemoglobin A1c is 7.8.  I recommended increasing his Jardiance to 25 mg daily.  The patient increase his Jardiance to 20 mg daily, and then picked up Jardiance at 25 mg daily when he was increased.  Currently he reports that he is feeling better and is tolerating the Jardiance at 25 mg daily.  He would like to recheck his hemoglobin A1c in September 2023.  He has held off on aspirin because ongoing issues with unresolved GI bleeding resulting in iron deficiency-see section below.     Diabetes monitoring and complications:  CAD: On Crestor 20 mg daily.  On Jardiance  25 mg daily.  May 2023 LDL 91  Last eye exam results: October 2022 eye exam unremarkable  Microalbuminuria: Negative in July 2022  Neuropathy: No  HTN: No  On Statin: Yes  On Aspirin: Yes  Depression: No  Erectile dysfunction: No    #2 Hyperlipidemia  Stress test in 2016 was unremarkable. December 2017 LDL was 44. At that time he was taking Lipitor 40 mg daily. August 2019 LDL was 78.  August 2021 LDL was 73. Feb 2023 LDL at 101.    Interval History  Now on Crestor 20 mg daily in March 2023, EGD showed stomach ulceration attributed to aspirin use.    #3 Goiter - March 2023 US showed bilateral subcentimeter nodules  June 2016, he was noted to have a multinodular goiter with several nodules roughly 1 cm in size. October 2016 formal neck ultrasound showed multinodular goiter, with  largest nodules on the right side at 1.1 cm in size (two nodules) and the largest nodule on the left side at 1.2 cm in size.  The patient had declined ultrasound-guided FNA in October  "2016. Eval with floor ultrasound in April 2017 showed the following: Right anterior nodule 1.0 x 0.6 x 0.6 cm in size, right posterior/inferior nodule at 1.0 x 0.5 x 1.0 cm in size, left inferior nodule at 0.6 x 0.5 x 0.7 cm in size. April 2018 US showed no interval change in thyroid nodules. Neck ultrasound performed in August 2020 continues to show small nodules bilaterally which are unchanged in size. Repeat neck ultrasound in April 2021 showed multiple nodules bilaterally, the largest about 1.1 cm in size-none met criteria for biopsy with no change compared with the previous exam in 2020. February 2023 TSH was 0.55,  Repeat neck ultrasound in March 2023 showed bilateral multiple subcentimeter nodules.      Interval History  Observe for now.    #4 iron deficiency anemia - ulcer see on March 2023 EGD and colonscopy  Noted on blood draw in February 2023 with noted low iron at 50, Ferritin low at 8, hemoglobin 12.5.  Of note, the patient reports a history of colonoscopy in 2019 which showed some diverticulosis. March 2023 EGD and colonoscopy significant for stomach ulcer - h. Pylori negative-this was attributed to aspirin use.  The patient has now held his baby aspirin    ADDENDUM: Patient has extensive GI evaluation.  However no source of bleeding has been yet identified.  He has received several iron infusions which have normalized his iron.  Current the plan is to see \"steady state\" of iron levels, with supplementation with either iron infusion or oral iron and if he continues to have ongoing iron deficiency, to repeat the GI evaluation.    #5 Word finding difficulty-possible mild cognitive impairment-observe for now  The patient's wife has noted patient has difficulty finding words to explain his thoughts.  The patient reports a history of cognitive decline in his mother when she was in her 90s.    Interval history: The patient has been seen at [facility] as well as the [facility].  Extensive " evaluation completed.  General recommendation is observation at this time, with reassessment in 1 year.    Review of Systems:   Pertinent items are noted in HPI.  All other systems are negative.    Active Medications:     Current Outpatient Medications:     bisacodyl (DULCOLAX) 5 MG EC tablet, At 12 p.m. noon take 2 Dulcolax tablets. (Patient not taking: Reported on 5/11/2023), Disp: 2 tablet, Rfl: 0    bisacodyl (DULCOLAX) 5 MG EC tablet, Take 2 tablets at 3 pm the day before your procedure. If your procedure is before 11 am, take 2 additional tablets at 11 pm. If your procedure is after 11 am, take 2 additional tablets at 6 am. For additional instructions refer to your colonoscopy prep instructions., Disp: 4 tablet, Rfl: 0    empagliflozin (JARDIANCE) 10 MG TABS tablet, Take 1 tablet (10 mg) by mouth daily, Disp: 90 tablet, Rfl: 4    fish oil-omega-3 fatty acids 500 MG capsule, Take 500 mg by mouth daily, Disp: , Rfl:     melatonin 1 MG TABS tablet, Take 1 mg by mouth At Bedtime, Disp: , Rfl:     metFORMIN (GLUCOPHAGE) 1000 MG tablet, Take 1 tablet (1,000 mg) by mouth 2 times daily (with meals), Disp: 180 tablet, Rfl: 3    omeprazole (PRILOSEC) 20 MG DR capsule, Take 1 capsule (20 mg) by mouth daily, Disp: 30 capsule, Rfl: 1    polyethylene glycol (GOLYTELY) 236 g suspension, The night before the exam at 6 pm drink an 8-ounce glass every 15 minutes until the jug is half empty. If you arrive before 11 AM: Drink the other half of the Golytely jug at 11 PM night before procedure. If you arrive after 11 AM: Drink the other half of the Golytely jug at 6 AM day of procedure. For additional instructions refer to your colonoscopy prep instructions., Disp: 4000 mL, Rfl: 0    polyethylene glycol (GOLYTELY) 236 g suspension, The night before the exam at 6 pm drink an 8-ounce glass every 15 minutes until the jug is half empty. If you arrive before 11 AM: Drink the other half of the Golytely jug at 11 PM night before  "procedure. If you arrive after 11 AM: Drink the other half of the Burstly jug at 6 AM day of procedure. For additional instructions refer to your colonoscopy prep instructions., Disp: 4000 mL, Rfl: 0    rosuvastatin (CRESTOR) 10 MG tablet, Take 2 tablets (20 mg) by mouth daily, Disp: 180 tablet, Rfl: 3    UNABLE TO FIND, Take 22 mg by mouth daily Raw Iron, with B12, vitamin C and folate (Patient not taking: Reported on 2023), Disp: , Rfl:     Current Facility-Administered Medications:     lidocaine 1 % injection 2 mL, 2 mL, INTRA-ARTICULAR, Once, Camila Baeza MD      Allergies:   Patient has no known allergies.      Past Medical History:  Type 2 diabetes mellitus  Goiter  Coronary arteriosclerosis  Hyperlipidemia  Obesity  Colon adenoma     Past Surgical History:  No past surgical history on file.    Family History:   Diabetes: Mother, father      Social History:   Social History     Tobacco Use    Smoking status: Former     Packs/day: 1.00     Types: Cigarettes, Cigars     Start date: 10/10/1978     Quit date: 2011     Years since quittin.5    Smokeless tobacco: Former   Substance Use Topics    Alcohol use: No    Drug use: No        Physical Exam:   GENERAL: Healthy, alert and no distress\",\"EYES: Eyes grossly normal to inspection.  No discharge or erythema, or obvious scleral/conjunctival abnormalities.\",\"RESP: No audible wheeze, cough, or visible cyanosis.  No visible retractions or increased work of breathing.  \",\"SKIN: Visible skin clear. No significant rash, abnormal pigmentation or lesions.\",\"NEURO: Cranial nerves grossly intact.  Mentation and speech appropriate for age, although the patient does seem to have difficulty noting the precise words to explain his thoughts..\",\"PSYCH: Mentation appears normal, affect normal/bright, judgement and insight intact, normal speech and appearance well-groomed.    Lab on 2023   Component Date Value Ref Range Status    Sodium 2023 138  136 " - 145 mmol/L Final    Potassium 08/02/2023 4.4  3.4 - 5.3 mmol/L Final    Chloride 08/02/2023 103  98 - 107 mmol/L Final    Carbon Dioxide (CO2) 08/02/2023 25  22 - 29 mmol/L Final    Anion Gap 08/02/2023 10  7 - 15 mmol/L Final    Urea Nitrogen 08/02/2023 13.4  8.0 - 23.0 mg/dL Final    Creatinine 08/02/2023 0.79  0.67 - 1.17 mg/dL Final    Calcium 08/02/2023 10.2  8.8 - 10.2 mg/dL Final    Glucose 08/02/2023 153 (H)  70 - 99 mg/dL Final    GFR Estimate 08/02/2023 >90  >60 mL/min/1.73m2 Final    Ferritin 08/02/2023 358  31 - 409 ng/mL Final    Iron 08/02/2023 118  61 - 157 ug/dL Final    Iron Binding Capacity 08/02/2023 350  240 - 430 ug/dL Final    Iron Sat Index 08/02/2023 34  15 - 46 % Final    Hold Specimen 08/02/2023 [facility]   Final    Hold Specimen 08/02/2023 [facility]   Final   Infusion Therapy Visit on 07/31/2023   Component Date Value Ref Range Status    Ferritin 07/31/2023 22 (L)  31 - 409 ng/mL Final    Iron 07/31/2023 95  61 - 157 ug/dL Final    Iron Binding Capacity 07/31/2023 367  240 - 430 ug/dL Final    Iron Sat Index 07/31/2023 26  15 - 46 % Final         Assessment and Plan:  #1 Diabetes mellitus type 2  Extensive discussion with patient.  Continue with metformin 1000 mg twice daily and Jardiance 25 mg daily.  Patient to draw blood and September 2023 for hemoglobin A1c and we will titrate accordingly.    #2 Hyperlipidemia  Discussed with patient that especially since he has held his baby aspirin, his cardiovascular risk factors should be optimized.   think we should hold his baby aspirin for now given the noted ulcer on March 2023 EGD.  His last stress test was in 2016.  His last CT scan for coronary artery calcification was 2012.  Continue with rosuvastatin 20 mg daily.    #3 Goiter - March 2023 US showed bilateral subcentimeter nodules  We will consider repeat thyroid nodule perhaps in 2025 or 2026.    #4 iron deficiency anemia - ulcer (h pylori negative)seen on March 2023 EGD and colonscopy  Getting  intermittent iron infusions, most recently at [facility].  Plan is to follow-up iron levels and repeat.  If the patient has ongoing evidence for iron deficiency, repeat GI evaluation would be needed.  Referral made back for GI for follow-up.    #5 Word finding difficulty-possible mild cognitive impairment-observe for now  See most recent neurology appointment in July 2023.  Observation for now.  Reassessment in 1 year.    Return visit in 6 months.

## 2023-08-03 NOTE — LETTER
8/3/2023       RE: Chema Mccullough  [address] [place] [address]     Dear Colleague,    Thank you for referring your patient, Chema Mccullough, to the [facility] ENDOCRINOLOGY [facility]INIC [place] at [facility]. Please see a copy of my visit note below.    Outcome for 07/27/23 9:26 AM: Julep message sent  Jodi Mills MA  Outcome for 07/28/23 7:29 AM: Patient is not checking blood sugars  Jodi Mills MA    Patient is showing 4/5 MNCM met. Aspirin not prescribed   Jodi Mills MA       Video-Visit Details    Type of service:  Video Visit    Virtual visit conducted by Parish.      Originating Location (pt. Location): OFFICE    Distant Location (provider location):  Off site    Mode of Communication:  Video Conference via [employer]    Physician has received verbal consent for a Video Visit from the patient? YES    8/3/23    Start time 9:00, stop time 925, chart review, documentation time, coordination of care, 15 minutes.  Total time spent 80 encounter 40 minutes.    [facility]  Endocrinology  Cheri Watson MD    Chief Complaint:   Diabetes    History of Present Illness:   Chema Mccullough is a 63  year old male with a history of type 2 diabetes and goiter who presents for follow up evaluation    #1 Diabetes mellitus type 2  The patient was diagnosed with prediabetes in 2004 and then in 2009 found to have fasting blood sugar of 184 with A1c of 7.5%.  He was started on Metformin XR in February 2009, dose increased to 500 mg twice daily in March 2013.  A1c from 2013 - 2016 was consistently in the 8% range.  November 2015 evaluation significant for detectable C-peptide of 3.2.  June 2016 his Metformin was increased to 2000 mg daily.  December 2017 he was started on Amaryl 1 mg daily as he was experiencing postprandial hyperglycemia. A1c between April 2018-October 2018 consistently in 6% range. August 2018 PSA was 0.8,  creatinine was 0.75, hemoglobin A1c of 6.6, urine for microalbumin was negative, and LDL was 78.  August 2019 hemoglobin A1c was 6.9.  During his previous visit on 8/16/19 SGLT-2 inhibitors and GLP-1 agonists were discussed, but the patient was hesitant to start them as they were new.  August 2019 hemoglobin A1c of 6.9.  February 2020 hemoglobin A1c is 7.1.    In February 2020, I had the patient try Jardiance at 10 mg daily and stop his glimepiride.   July 2020 hemoglobin A1c is 6.8, LDL 68, TSH 0.85.December 2020 urine for protein was negative, creatinine was 0.82, hemoglobin A1c of 6.9, LDL was 80.   August 2021 LDL was 73, August 2021 when hemoglobin A1c 6.7, TSH 0.73  December 2021 hemoglobin A1c at 7.4, March 2022 hemoglobin A1c of 7.1, July 2022 hemoglobin A1c at 7.0.     February 2023 hemoglobin A1c at 7.6.  Potassium 4.4, creatinine 0.79, LDL was 101, HDL was 41, triglycerides 98    Interval history: The patient is currently on Crestor at 20 mg daily.  In May 2023, hemoglobin A1c is 7.8.  I recommended increasing his Jardiance to 25 mg daily.  The patient increase his Jardiance to 20 mg daily, and then picked up Jardiance at 25 mg daily when he was increased.  Currently he reports that he is feeling better and is tolerating the Jardiance at 25 mg daily.  He would like to recheck his hemoglobin A1c in September 2023.  He has held off on aspirin because ongoing issues with unresolved GI bleeding resulting in iron deficiency-see section below.     Diabetes monitoring and complications:  CAD: On Crestor 20 mg daily.  On Jardiance  25 mg daily.  May 2023 LDL 91  Last eye exam results: October 2022 eye exam unremarkable  Microalbuminuria: Negative in July 2022  Neuropathy: No  HTN: No  On Statin: Yes  On Aspirin: Yes  Depression: No  Erectile dysfunction: No    #2 Hyperlipidemia  Stress test in 2016 was unremarkable. December 2017 LDL was 44. At that time he was taking Lipitor 40 mg daily. August 2019 LDL was 78.   August 2021 LDL was 73. Feb 2023 LDL at 101.    Interval History  Now on Crestor 20 mg daily in March 2023, EGD showed stomach ulceration attributed to aspirin use.    #3 Goiter - March 2023 US showed bilateral subcentimeter nodules  June 2016, he was noted to have a multinodular goiter with several nodules roughly 1 cm in size. October 2016 formal neck ultrasound showed multinodular goiter, with  largest nodules on the right side at 1.1 cm in size (two nodules) and the largest nodule on the left side at 1.2 cm in size.  The patient had declined ultrasound-guided FNA in October 2016. Eval with floor ultrasound in April 2017 showed the following: Right anterior nodule 1.0 x 0.6 x 0.6 cm in size, right posterior/inferior nodule at 1.0 x 0.5 x 1.0 cm in size, left inferior nodule at 0.6 x 0.5 x 0.7 cm in size. April 2018 US showed no interval change in thyroid nodules. Neck ultrasound performed in August 2020 continues to show small nodules bilaterally which are unchanged in size. Repeat neck ultrasound in April 2021 showed multiple nodules bilaterally, the largest about 1.1 cm in size-none met criteria for biopsy with no change compared with the previous exam in 2020. February 2023 TSH was 0.55,  Repeat neck ultrasound in March 2023 showed bilateral multiple subcentimeter nodules.      Interval History  Observe for now.    #4 iron deficiency anemia - ulcer see on March 2023 EGD and colonscopy  Noted on blood draw in February 2023 with noted low iron at 50, Ferritin low at 8, hemoglobin 12.5.  Of note, the patient reports a history of colonoscopy in 2019 which showed some diverticulosis. March 2023 EGD and colonoscopy significant for stomach ulcer - h. Pylori negative-this was attributed to aspirin use.  The patient has now held his baby aspirin    ADDENDUM: Patient has extensive GI evaluation.  However no source of bleeding has been yet identified.  He has received several iron infusions which have normalized his  "iron.  Current the plan is to see \"steady state\" of iron levels, with supplementation with either iron infusion or oral iron and if he continues to have ongoing iron deficiency, to repeat the GI evaluation.    #5 Word finding difficulty-possible mild cognitive impairment-observe for now  The patient's wife has noted patient has difficulty finding words to explain his thoughts.  The patient reports a history of cognitive decline in his mother when she was in her 90s.    Interval history: The patient has been seen at [facility] as well as the [facility].  Extensive evaluation completed.  General recommendation is observation at this time, with reassessment in 1 year.    Review of Systems:   Pertinent items are noted in HPI.  All other systems are negative.    Active Medications:     Current Outpatient Medications:     bisacodyl (DULCOLAX) 5 MG EC tablet, At 12 p.m. noon take 2 Dulcolax tablets. (Patient not taking: Reported on 5/11/2023), Disp: 2 tablet, Rfl: 0    bisacodyl (DULCOLAX) 5 MG EC tablet, Take 2 tablets at 3 pm the day before your procedure. If your procedure is before 11 am, take 2 additional tablets at 11 pm. If your procedure is after 11 am, take 2 additional tablets at 6 am. For additional instructions refer to your colonoscopy prep instructions., Disp: 4 tablet, Rfl: 0    empagliflozin (JARDIANCE) 10 MG TABS tablet, Take 1 tablet (10 mg) by mouth daily, Disp: 90 tablet, Rfl: 4    fish oil-omega-3 fatty acids 500 MG capsule, Take 500 mg by mouth daily, Disp: , Rfl:     melatonin 1 MG TABS tablet, Take 1 mg by mouth At Bedtime, Disp: , Rfl:     metFORMIN (GLUCOPHAGE) 1000 MG tablet, Take 1 tablet (1,000 mg) by mouth 2 times daily (with meals), Disp: 180 tablet, Rfl: 3    omeprazole (PRILOSEC) 20 MG DR capsule, Take 1 capsule (20 mg) by mouth daily, Disp: 30 capsule, Rfl: 1    polyethylene glycol (GOLYTELY) 236 g suspension, The night before the exam at 6 pm drink an 8-ounce glass every 15 " "minutes until the jug is half empty. If you arrive before 11 AM: Drink the other half of the Golytely jug at 11 PM night before procedure. If you arrive after 11 AM: Drink the other half of the Golytely jug at 6 AM day of procedure. For additional instructions refer to your colonoscopy prep instructions., Disp: 4000 mL, Rfl: 0    polyethylene glycol (GOLYTELY) 236 g suspension, The night before the exam at 6 pm drink an 8-ounce glass every 15 minutes until the jug is half empty. If you arrive before 11 AM: Drink the other half of the Golytely jug at 11 PM night before procedure. If you arrive after 11 AM: Drink the other half of the Golytely jug at 6 AM day of procedure. For additional instructions refer to your colonoscopy prep instructions., Disp: 4000 mL, Rfl: 0    rosuvastatin (CRESTOR) 10 MG tablet, Take 2 tablets (20 mg) by mouth daily, Disp: 180 tablet, Rfl: 3    UNABLE TO FIND, Take 22 mg by mouth daily Raw Iron, with B12, vitamin C and folate (Patient not taking: Reported on 2023), Disp: , Rfl:     Current Facility-Administered Medications:     lidocaine 1 % injection 2 mL, 2 mL, INTRA-ARTICULAR, Once, Camila Baeza MD      Allergies:   Patient has no known allergies.      Past Medical History:  Type 2 diabetes mellitus  Goiter  Coronary arteriosclerosis  Hyperlipidemia  Obesity  Colon adenoma     Past Surgical History:  No past surgical history on file.    Family History:   Diabetes: Mother, father      Social History:   Social History     Tobacco Use    Smoking status: Former     Packs/day: 1.00     Types: Cigarettes, Cigars     Start date: 10/10/1978     Quit date: 2011     Years since quittin.5    Smokeless tobacco: Former   Substance Use Topics    Alcohol use: No    Drug use: No        Physical Exam:   GENERAL: Healthy, alert and no distress\",\"EYES: Eyes grossly normal to inspection.  No discharge or erythema, or obvious scleral/conjunctival abnormalities.\",\"RESP: No audible wheeze, " "cough, or visible cyanosis.  No visible retractions or increased work of breathing.  \",\"SKIN: Visible skin clear. No significant rash, abnormal pigmentation or lesions.\",\"NEURO: Cranial nerves grossly intact.  Mentation and speech appropriate for age, although the patient does seem to have difficulty noting the precise words to explain his thoughts..\",\"PSYCH: Mentation appears normal, affect normal/bright, judgement and insight intact, normal speech and appearance well-groomed.    Lab on 08/02/2023   Component Date Value Ref Range Status    Sodium 08/02/2023 138  136 - 145 mmol/L Final    Potassium 08/02/2023 4.4  3.4 - 5.3 mmol/L Final    Chloride 08/02/2023 103  98 - 107 mmol/L Final    Carbon Dioxide (CO2) 08/02/2023 25  22 - 29 mmol/L Final    Anion Gap 08/02/2023 10  7 - 15 mmol/L Final    Urea Nitrogen 08/02/2023 13.4  8.0 - 23.0 mg/dL Final    Creatinine 08/02/2023 0.79  0.67 - 1.17 mg/dL Final    Calcium 08/02/2023 10.2  8.8 - 10.2 mg/dL Final    Glucose 08/02/2023 153 (H)  70 - 99 mg/dL Final    GFR Estimate 08/02/2023 >90  >60 mL/min/1.73m2 Final    Ferritin 08/02/2023 358  31 - 409 ng/mL Final    Iron 08/02/2023 118  61 - 157 ug/dL Final    Iron Binding Capacity 08/02/2023 350  240 - 430 ug/dL Final    Iron Sat Index 08/02/2023 34  15 - 46 % Final    Hold Specimen 08/02/2023 [facility]   Final    Hold Specimen 08/02/2023 [facility]   Final   Infusion Therapy Visit on 07/31/2023   Component Date Value Ref Range Status    Ferritin 07/31/2023 22 (L)  31 - 409 ng/mL Final    Iron 07/31/2023 95  61 - 157 ug/dL Final    Iron Binding Capacity 07/31/2023 367  240 - 430 ug/dL Final    Iron Sat Index 07/31/2023 26  15 - 46 % Final         Assessment and Plan:  #1 Diabetes mellitus type 2  Extensive discussion with patient.  Continue with metformin 1000 mg twice daily and Jardiance 25 mg daily.  Patient to draw blood and September 2023 for hemoglobin A1c and we will titrate accordingly.    #2 Hyperlipidemia  Discussed with " patient that especially since he has held his baby aspirin, his cardiovascular risk factors should be optimized.   think we should hold his baby aspirin for now given the noted ulcer on March 2023 EGD.  His last stress test was in 2016.  His last CT scan for coronary artery calcification was 2012.  Continue with rosuvastatin 20 mg daily.    #3 Goiter - March 2023 US showed bilateral subcentimeter nodules  We will consider repeat thyroid nodule perhaps in 2025 or 2026.    #4 iron deficiency anemia - ulcer (h pylori negative)seen on March 2023 EGD and colonscopy  Getting intermittent iron infusions, most recently at [facility].  Plan is to follow-up iron levels and repeat.  If the patient has ongoing evidence for iron deficiency, repeat GI evaluation would be needed.  Referral made back for GI for follow-up.    #5 Word finding difficulty-possible mild cognitive impairment-observe for now  See most recent neurology appointment in July 2023.  Observation for now.  Reassessment in 1 year.    Return visit in 6 months.      Again, thank you for allowing me to participate in the care of your patient.      Sincerely,    Cheri Watson MD

## 2023-09-25 DIAGNOSIS — D50.0 IRON DEFICIENCY ANEMIA DUE TO CHRONIC BLOOD LOSS: Primary | ICD-10-CM

## 2023-09-26 ENCOUNTER — OFFICE VISIT (OUTPATIENT)
Dept: GASTROENTEROLOGY | Facility: CLINIC | Age: 63
End: 2023-09-26
Attending: INTERNAL MEDICINE
Payer: COMMERCIAL

## 2023-09-26 ENCOUNTER — LAB (OUTPATIENT)
Dept: LAB | Facility: CLINIC | Age: 63
End: 2023-09-26
Payer: COMMERCIAL

## 2023-09-26 VITALS
HEIGHT: 70 IN | BODY MASS INDEX: 24.07 KG/M2 | DIASTOLIC BLOOD PRESSURE: 69 MMHG | SYSTOLIC BLOOD PRESSURE: 113 MMHG | HEART RATE: 95 BPM | WEIGHT: 168.1 LBS | OXYGEN SATURATION: 95 %

## 2023-09-26 DIAGNOSIS — K92.2 GASTROINTESTINAL HEMORRHAGE, UNSPECIFIED GASTROINTESTINAL HEMORRHAGE TYPE: ICD-10-CM

## 2023-09-26 DIAGNOSIS — D50.0 IRON DEFICIENCY ANEMIA DUE TO CHRONIC BLOOD LOSS: ICD-10-CM

## 2023-09-26 DIAGNOSIS — E11.9 TYPE 2 DIABETES MELLITUS WITHOUT COMPLICATION, WITHOUT LONG-TERM CURRENT USE OF INSULIN (H): ICD-10-CM

## 2023-09-26 LAB
ERYTHROCYTE [DISTWIDTH] IN BLOOD BY AUTOMATED COUNT: 12.1 % (ref 10–15)
FERRITIN SERPL-MCNC: 96 NG/ML (ref 31–409)
HBA1C MFR BLD: 7.6 %
HCT VFR BLD AUTO: 43.7 % (ref 40–53)
HGB BLD-MCNC: 15.6 G/DL (ref 13.3–17.7)
IRON BINDING CAPACITY (ROCHE): 332 UG/DL (ref 240–430)
IRON SATN MFR SERPL: 36 % (ref 15–46)
IRON SERPL-MCNC: 121 UG/DL (ref 61–157)
MCH RBC QN AUTO: 30.9 PG (ref 26.5–33)
MCHC RBC AUTO-ENTMCNC: 35.7 G/DL (ref 31.5–36.5)
MCV RBC AUTO: 87 FL (ref 78–100)
PLATELET # BLD AUTO: 178 10E3/UL (ref 150–450)
RBC # BLD AUTO: 5.05 10E6/UL (ref 4.4–5.9)
WBC # BLD AUTO: 5.3 10E3/UL (ref 4–11)

## 2023-09-26 PROCEDURE — 83550 IRON BINDING TEST: CPT | Performed by: PATHOLOGY

## 2023-09-26 PROCEDURE — 83036 HEMOGLOBIN GLYCOSYLATED A1C: CPT | Performed by: INTERNAL MEDICINE

## 2023-09-26 PROCEDURE — 82728 ASSAY OF FERRITIN: CPT | Performed by: PATHOLOGY

## 2023-09-26 PROCEDURE — 83540 ASSAY OF IRON: CPT | Performed by: PATHOLOGY

## 2023-09-26 PROCEDURE — 99215 OFFICE O/P EST HI 40 MIN: CPT | Mod: GC | Performed by: STUDENT IN AN ORGANIZED HEALTH CARE EDUCATION/TRAINING PROGRAM

## 2023-09-26 PROCEDURE — 99000 SPECIMEN HANDLING OFFICE-LAB: CPT | Performed by: PATHOLOGY

## 2023-09-26 PROCEDURE — 85027 COMPLETE CBC AUTOMATED: CPT | Performed by: PATHOLOGY

## 2023-09-26 PROCEDURE — 36415 COLL VENOUS BLD VENIPUNCTURE: CPT | Performed by: PATHOLOGY

## 2023-09-26 ASSESSMENT — PAIN SCALES - GENERAL: PAINLEVEL: NO PAIN (0)

## 2023-09-26 NOTE — LETTER
9/26/2023         RE: Chema Mccullough  [address] [place] [address]        Dear Colleague,    Thank you for referring your patient, Chema Mccullough, to the [facility] [place]. Please see a copy of my visit note below.    GI CLINIC VISIT    CC/REFERRING MD:  Cheri Watson  REASON FOR CONSULTATION:   Cheri Watson for   Chief Complaint   Patient presents with    Follow Up       ASSESSMENT/PLAN:  64yo M w/ PMH of DMII and HLD who is presenting for DANIEL.     #DANIEL  Currently resolved, although the patient did receive an iron infusion in early August (today's iron studies and CBC are wnl); he never had overt GI bleeding. Most likely, the etiology of the initial DANIEL was related to the AVMs seen on colonoscopy, possibly exacerbated by the ASA he was taking for primary prevention which he has since stopped. On his recent video capsule endoscopy, the patient did have abnormal-appearing villi in the distal duodenum/prox jejunum with venous structures although will defer further work-up of this with push enteroscopy unless he develops e/o bleeding again  - Repeat CBC and iron studies in December per patient request, although these checks can be spaced out     #Multifocal intestinal metaplasia (incomplete and complete)    No personal or family hx of gastric adenocarcinoma; s/p mapping biopsies in March; H. Pylori negative   - No further follow-up recommended     #CRC screening  Colonoscopy in March was not sufficient for surveillance given that it was diagnostic. Reports he had a colonoscopy in 2019 although I cannot see these results in Epic.  - Colonoscopy for surveillance within the next year     RTC: PRN       60 minutes spent on the date of the encounter performing chart review, history and exam, documentation and further activities as noted above.  .    Patient was seen and d/w Dr. Irvin Singleton MD  GI Fellow        HPI  64yo M w/ PMH of DMII and  DARREN who is presenting for DANIEL.     The patient has a hx of DANIEL which has been worked up for with EGDs, colonoscopy, and video capsule endoscopy. He has received three iron infusions total (last was early August), and today's iron studies and CBC are wnl. The patient is asymptomatic with no melena, hematochezia, hematemesis, hematuria, weakness, or nosebleeds. He is not on oral iron.     ROS:    Negative except per HPI     PROBLEM LIST  Patient Active Problem List    Diagnosis Date Noted    Anemia, iron deficiency 05/04/2023     Priority: Medium    Chest pain 07/28/2022     Priority: Medium    Family history of premature coronary heart disease 07/28/2022     Priority: Medium     Formatting of this note might be different from the original.  Father had myocardial infarction at age 46.      Pain of right hand 04/14/2017     Priority: Medium    Adenomatous polyp of colon 11/21/2016     Priority: Medium    Goiter 08/19/2016     Priority: Medium    Diabetes mellitus (H) 01/18/2013     Priority: Medium    Coronary atherosclerosis 08/30/2012     Priority: Medium     Formatting of this note might be different from the original.  Obstructive lesion in the ostium of the second diagonal - small vessel seen in Angiogram June 2012  Sequential lesions in the right coronary artery, sees Dr Tanmay Rush of [facility] clinic [phone]  ; Coronary artery disease  Formatting of this note might be different from the original.  Calcium score 721 in December 2011.      Lesion of mouth 09/01/2010     Priority: Medium     Formatting of this note might be different from the original.  Uvula Papilloma      Hyperlipidemia 01/02/2008     Priority: Medium     Formatting of this note might be different from the original.  ICD 10      Type 2 diabetes mellitus (H) 06/17/2005     Priority: Medium     Formatting of this note might be different from the original.  Careplan:  Background:  he sees  Endocrine [facility]    Goals/Recommendations:  January 2013  - goal to lose 10 pounds to see if will improve glucose control  initial goal 189 #    ; Type II or unspecified type diabetes mellitus without mention of complication, not stated as uncontrolled (HRC)  Formatting of this note might be different from the original.  a system change updated this record. This will not affect patient care or billing. This comment can be deleted.         PERTINENT PAST MEDICAL HISTORY:  Past Medical History:   Diagnosis Date    Diabetes (H)        PREVIOUS SURGERIES:  Past Surgical History:   Procedure Laterality Date    CAPSULE/PILL CAM ENDOSCOPY N/A 5/15/2023    Procedure: Capsule/pill cam endoscopy;  Surgeon: Carlyle Underwood MD;  Location: [facility]    COLONOSCOPY N/A 3/8/2023    Procedure: Colonoscopy with APC;  Surgeon: Carlyle Zelaya MD;  Location: [facility]    ESOPHAGOSCOPY, GASTROSCOPY, DUODENOSCOPY (EGD), COMBINED N/A 3/8/2023    Procedure: Esophagoscopy, gastroscopy, duodenoscopy (EGD), combined;  Surgeon: Carlyle Zelaya MD;  Location: [facility]    ESOPHAGOSCOPY, GASTROSCOPY, DUODENOSCOPY (EGD), COMBINED N/A 5/3/2023    Procedure: Esophagoscopy, gastroscopy, duodenoscopy (EGD), combined;  Surgeon: Carlyle Zelaya MD;  Location: [facility]       PREVIOUS ENDOSCOPY:  Procedure:             Colonoscopy 3/8/23  Indications:           Iron deficiency anemia   Providers:             CARLYLE ZELAYA MD, Tete Barclay MD:          MAGGIE REYES MD   Medicines:             Fentanyl 50 micrograms IV, Total sedation time: 28                          minutes of continuous bedside 1:1 monitoring. See EGD                          note for additional medications and time.   Complications:         No immediate complications.   Impression:            - Hemorrhoids found on perianal exam.                          - The examined portion of the ileum was normal.                          - A single non-bleeding colonic  angioectasia. Treated                          with argon plasma coagulation (APC).                          - A single non-bleeding colonic angioectasia. Treated                          with argon plasma coagulation (APC).                          - The distal rectum and anal verge are normal on                          retroflexion view.                          - No specimens collected.   Recommendation:        - Discharge patient to home (ambulatory).                          - Follow-up with Lore Barber in GI clinic as scheduled.                          - See EGD note.                          Repeat colonoscopy for surveillance based on findings                          of most recent colonsocopy in 2019, which I cannot                          find in Epic.     Procedure:             Upper GI endoscopy 3/8/23  Indications:           Iron deficiency anemia   Providers:             CARLYLE ZELAYA MD, Tete Barclay MD:          LORE BARBER, MAGGIE COUCH MD   Medicines:             Midazolam 2 mg IV, Fentanyl 100 micrograms IV, Total                          sedation time: 16 minutes of continuous bedside 1:1                          monitoring.   Complications:         No immediate complications.   Findings:       The esophagus was normal.        Diffuse mildly erythematous mucosa without bleeding was found in the        entire examined stomach. Biopsies were taken with a cold forceps for        histology.        One non-bleeding superficial gastric ulcer with no stigmata of bleeding        was found in the prepyloric region of the stomach. The lesion was 4 mm        in largest dimension. Biopsies were taken with a cold forceps for        histology.        Patchy mucosal variance characterized by white specks was found in the        prepyloric region of the stomach. Biopsies were taken with a cold        forceps for histology.        The examined duodenum was normal. Biopsies for histology  were taken with        a cold forceps for evaluation of celiac disease.        The cardia and gastric fundus were normal on retroflexion.                                                                                    Impression:            - Normal esophagus.                          - Erythematous mucosa in the stomach. Biopsied.                          - Non-bleeding gastric ulcer with no stigmata of                          bleeding. Biopsied.                          - Gastric mucosal variant with pale mucosa in the                          prepyloric antrum. May indicate intestinal metaplasia.                          Biopsied here and then mapping biopsies performed.                          - Normal examined duodenum. Biopsied.   Recommendation:        - Await pathology results.                          - Use Prilosec (omeprazole) 20 mg PO daily for 2                          months.                          - Repeat upper endoscopy in 2 months to check healing.                          - See colonoscopy report.                          - The pt was instruced to check with his primary care                          provider re whether aspirin is medically necessary or                          can be stopped in light of the gastric ulcer.                          - Follow-up with Jennifer Mclain as scheduled.   Addendum   Immunoperoxidase stains for Helicobacter pylori are performed on blocks B-G and are all negative (appropriate controls obtained).   Addendum electronically signed by Tanmay Angeles MD on 3/12/2023 at 11:02 AM   Final Diagnosis   A(1). DUODENAL BIOPSY:  -Duodenal mucosa with no significant histopathologic abnormality  -No evidence of celiac disease     B(2). PERIPYLORIC REGION, BIOPSY:  -Gastric antral mucosa with mild chronic inactive gastritis and multifocal intestinal metaplasia (complete and incomplete type)  -Negative for dysplasia     C(3). ANTRUM GREATER CURVATURE,  BIOPSY:  -Gastric antral and body type mucosa with chronic inactive gastritis   -Negative for intestinal metaplasia and dysplasia     D(4). ANTRUM LESSER CURVATURE, BIOPSY:  -Gastric antral mucosa with chronic inactive gastritis   -Negative for intestinal metaplasia and dysplasia     E(5). INCISURA, BIOPSY:  -Gastric body type mucosa with chronic inactive gastritis   -Negative for intestinal metaplasia and dysplasia     F(6). BODY GREATER CURVATURE, BIOPSY:  -Gastric body type mucosa with chronic inactive gastritis   -Negative for intestinal metaplasia and dysplasia     G(7). BODY LESSER CURVATURE, BIOPSY:  -Gastric body type mucosa with chronic inactive gastritis   -Negative for intestinal metaplasia and dysplasia     Procedure:             Upper GI endoscopy 5/3/23  Indications:           Follow-up of gastric ulcer   Providers:             CARLYLE ZELAYA MD, Karina Baer, ZULLY, Qamar Zarate RN   Referring MD:          MAGGIE REYES MD   Medicines:             Midazolam 2 mg IV, Fentanyl 100 micrograms IV,                          Benzocaine spray, Total sedation time: 9 minutes of                          continuous bedside 1:1 monitoring.   Complications:         No immediate complications.   Findings:       The esophagus was normal.        Diffuse moderately erythematous mucosa without bleeding was found in the        entire examined stomach. Biopsies were taken with a cold forceps for        histology.        A 4 mm healed ulcer was found in the prepyloric region of the stomach.        This mucosa appeared reepithelialized but pale. Biopsies were taken with        a cold forceps for histology.        The examined duodenum was normal.        The cardia and gastric fundus were normal on retroflexion.                                                                                    Impression:            - Normal esophagus.                          - Diffusely  erythematous mucosa in the stomach.                          Biopsied again given refractory iron deficiency. Prior                          mapping biopsies showed chronic inactive gastritis and                          no H pylori or intestinal metaplasia.                          - Scar in the prepyloric region of the stomach.                          Biopsied given the refractory iron deficiency.                          - Normal examined duodenum.   Recommendation:        - Await pathology results.                          - Return to referring physician/GI clinic.                          If biopsies fail to show an etiology for iron                          deficiency, then would recommend consideration of                          capsule endoscopy. Message sent to Jennifer Barber.     Final Diagnosis   A. GASTRIC ANTRUM SCAR, BIOPSY:  - Gastric antral mucosa with mild chronic inactive gastritis, background reactive gastropathy with intestinal metaplasia (complete)  - No H. pylori-like organisms identified on routine staining   - Negative for dysplasia    B.  STOMACH, BIOPSIES:  - Gastric oxyntic mucosa with chronic inactive gastritis  - No H. pylori-like organisms identified on routine stain  - Negative for intestinal metaplasia and dysplasia       Procedure:             Video capsule endoscopy 5/18/23  Indications:           Iron deficiency anemia   Providers:             CARLYLE VILLALOBOS MD   Referring MD:          JENNIFER BARBER   Medicines:             None   Complications:         No immediate complications.   Findings:       Images of the esophagus, stomach and small bowel were obtained from the        swallowed capsule and reviewed.        The gastric passage time of the capsule enteroscope was 0-hours        16-minutes.        The small bowel passage time of the capsule enteroscope was 2-hours        56-minutes. The capsule entered the colon.        A localized area of abnormal mucosa with some edema and  flattene villi        in the small bowel (proximal small bowel) at 26 mins, 10 mins past        pylorus.        A few venous structures with no stigmata of recent bleeding were seen in        the small bowel (mid small bowel). They were small.        Lymphangiectasia was found in the small bowel (mid small bowel).        Exam of the small bowel was otherwise normal.                                                                                    Impression:            - Localized, relatively short, area of abnormal villi                          in the distal duodenum/proximal jejunum.                          - A few venous structures with no stigmata of recent                          bleeding in the small bowel                          - Small bowel lymphangiectasia.                          - No obvious explanation for iron deficiency found   Recommendation:        - Push Enteroscopy to assess area of abnormal villi.                          - Check celiac serologies if not already done so.                          - Resume previous diet.                          - Return to referring physician.                                                                                 ALLERGIES:   No Known Allergies    PERTINENT MEDICATIONS:    Current Outpatient Medications:     empagliflozin (JARDIANCE) 25 MG TABS tablet, Take 1 tablet (25 mg) by mouth daily, Disp: 90 tablet, Rfl: 4    fish oil-omega-3 fatty acids 500 MG capsule, Take 500 mg by mouth daily, Disp: , Rfl:     melatonin 1 MG TABS tablet, Take 1 mg by mouth At Bedtime, Disp: , Rfl:     metFORMIN (GLUCOPHAGE) 1000 MG tablet, Take 1 tablet (1,000 mg) by mouth 2 times daily (with meals), Disp: 180 tablet, Rfl: 3    rosuvastatin (CRESTOR) 10 MG tablet, Take 2 tablets (20 mg) by mouth daily, Disp: 180 tablet, Rfl: 3    UNABLE TO FIND, Take 22 mg by mouth daily Raw Iron, with B12, vitamin C and folate (Patient not taking: Reported on 8/2/2023), Disp: , Rfl:  "    Current Facility-Administered Medications:     lidocaine 1 % injection 2 mL, 2 mL, INTRA-ARTICULAR, Once, Camila Baeza MD    SOCIAL HISTORY:  Social History     Socioeconomic History    Marital status:      Spouse name: Not on file    Number of children: Not on file    Years of education: Not on file    Highest education level: Not on file   Occupational History    Not on file   Tobacco Use    Smoking status: Former     Packs/day: 1.00     Types: Cigarettes, Cigars     Start date: 10/10/1978     Quit date: 2011     Years since quittin.7    Smokeless tobacco: Former   Substance and Sexual Activity    Alcohol use: No    Drug use: No    Sexual activity: Yes     Partners: Female   Other Topics Concern    Parent/sibling w/ CABG, MI or angioplasty before 65F 55M? Not Asked   Social History Narrative    Not on file     Social Determinants of Health     Financial Resource Strain: Not on file   Food Insecurity: Not on file   Transportation Needs: Not on file   Physical Activity: Not on file   Stress: Not on file   Social Connections: Not on file   Interpersonal Safety: Not on file   Housing Stability: Not on file       FAMILY HISTORY:  Family History   Problem Relation Age of Onset    Diabetes Mother     Diabetes Father     Cancer No family hx of         no skin cancer    Glaucoma No family hx of     Macular Degeneration No family hx of        Past/family/social history reviewed and no changes    PHYSICAL EXAMINATION:  Constitutional: aaox3, cooperative, pleasant, not dyspneic/diaphoretic, no acute distress  Vitals reviewed: /69   Pulse 95   Ht 1.778 m (5' 10\")   Wt 76.2 kg (168 lb 1.6 oz)   SpO2 95%   BMI 24.12 kg/m    Wt:   Wt Readings from Last 2 Encounters:   23 76.2 kg (168 lb 1.6 oz)   23 75.8 kg (167 lb)      Eyes: Sclera anicteric/injected  Ears/nose/mouth/throat: hearing intact  Neck: supple, thyroid normal size  CV: No edema  Respiratory: Unlabored breathing  Abd:  " Nondistended,  no hepatosplenomegaly, nontender, no peritoneal signs  Skin: warm, perfused, no jaundice  Psych: Normal affect  MSK: Normal gait         PERTINENT STUDIES:    Lab on 09/26/2023   Component Date Value Ref Range Status    Hemoglobin A1C 09/26/2023 7.6 (H)  <5.7 % Final    WBC Count 09/26/2023 5.3  4.0 - 11.0 10e3/uL Final    RBC Count 09/26/2023 5.05  4.40 - 5.90 10e6/uL Final    Hemoglobin 09/26/2023 15.6  13.3 - 17.7 g/dL Final    Hematocrit 09/26/2023 43.7  40.0 - 53.0 % Final    MCV 09/26/2023 87  78 - 100 fL Final    MCH 09/26/2023 30.9  26.5 - 33.0 pg Final    MCHC 09/26/2023 35.7  31.5 - 36.5 g/dL Final    RDW 09/26/2023 12.1  10.0 - 15.0 % Final    Platelet Count 09/26/2023 178  150 - 450 10e3/uL Final    Iron 09/26/2023 121  61 - 157 ug/dL Final    Iron Binding Capacity 09/26/2023 332  240 - 430 ug/dL Final    Iron Sat Index 09/26/2023 36  15 - 46 % Final    Ferritin 09/26/2023 96  31 - 409 ng/mL Final           Again, thank you for allowing me to participate in the care of your patient.      Sincerely,    Jovanna Singleton MD

## 2023-09-26 NOTE — NURSING NOTE
"Chief Complaint   Patient presents with    Follow Up       Vitals:    09/26/23 1353   BP: 113/69   Pulse: 95   SpO2: 95%   Weight: 76.2 kg (168 lb 1.6 oz)   Height: 1.778 m (5' 10\")       Body mass index is 24.12 kg/m .    Nargis Burgos MA    "

## 2023-10-01 ASSESSMENT — PATIENT HEALTH QUESTIONNAIRE - PHQ9
SUM OF ALL RESPONSES TO PHQ QUESTIONS 1-9: 1
10. IF YOU CHECKED OFF ANY PROBLEMS, HOW DIFFICULT HAVE THESE PROBLEMS MADE IT FOR YOU TO DO YOUR WORK, TAKE CARE OF THINGS AT HOME, OR GET ALONG WITH OTHER PEOPLE: NOT DIFFICULT AT ALL
SUM OF ALL RESPONSES TO PHQ QUESTIONS 1-9: 1

## 2023-10-02 ENCOUNTER — VIRTUAL VISIT (OUTPATIENT)
Dept: PSYCHOLOGY | Facility: CLINIC | Age: 63
End: 2023-10-02
Attending: PSYCHIATRY & NEUROLOGY
Payer: COMMERCIAL

## 2023-10-02 DIAGNOSIS — F43.23 ADJUSTMENT DISORDER WITH MIXED ANXIETY AND DEPRESSED MOOD: Primary | ICD-10-CM

## 2023-10-02 DIAGNOSIS — R45.82 FEELING WORRIED: ICD-10-CM

## 2023-10-02 PROCEDURE — 90791 PSYCH DIAGNOSTIC EVALUATION: CPT | Mod: 95 | Performed by: MARRIAGE & FAMILY THERAPIST

## 2023-10-02 ASSESSMENT — COLUMBIA-SUICIDE SEVERITY RATING SCALE - C-SSRS
1. HAVE YOU WISHED YOU WERE DEAD OR WISHED YOU COULD GO TO SLEEP AND NOT WAKE UP?: NO
2. HAVE YOU ACTUALLY HAD ANY THOUGHTS OF KILLING YOURSELF?: NO
6. HAVE YOU EVER DONE ANYTHING, STARTED TO DO ANYTHING, OR PREPARED TO DO ANYTHING TO END YOUR LIFE?: NO
TOTAL  NUMBER OF ABORTED OR SELF INTERRUPTED ATTEMPTS LIFETIME: NO
ATTEMPT LIFETIME: NO
TOTAL  NUMBER OF INTERRUPTED ATTEMPTS LIFETIME: NO

## 2023-10-02 NOTE — Clinical Note
Dr. Forrest Valadez.  I met with Chema for Diagnostic Assessment/therapy intake.  Plan for therapy will be to focus on increasing calm mindset/adjustment to medical issues.  Thank you for the referral--looking forward to working further with Chema!

## 2023-10-02 NOTE — PROGRESS NOTES
"[facility]      PATIENT'S NAME: Chema Mccullough  PREFERRED NAME: Chema  PRONOUNS:  he/him/his     MRN: [id number]  : 1960  ADDRESS: [address] [place], [place] [address] [place] [address]  [facility]  [id number]  DATE OF SERVICE: 10/02/23  START TIME: 1:00  END TIME: 2:00  PREFERRED PHONE: [phone]  May we leave a program related message: Yes  SERVICE MODALITY:  Video Visit:      Provider verified identity through the following two step process.  Patient provided:  Patient photo and Patient was verified at admission/transfer    Telemedicine Visit: The patient's condition can be safely assessed and treated via synchronous audio and visual telemedicine encounter.      Reason for Telemedicine Visit: Services only offered telehealth    Originating Site (Patient Location): Patient's place of employment    Distant Site (Provider Location): Provider Remote Setting- Home Office    Consent:  The patient/guardian has verbally consented to: the potential risks and benefits of telemedicine (video visit) versus in person care; bill my insurance or make self-payment for services provided; and responsibility for payment of non-covered services.     Patient would like the video invitation sent by:  My Chart    Mode of Communication:  Video Conference via Amwell    Distant Location (Provider):  Off-site    As the provider I attest to compliance with applicable laws and regulations related to telemedicine.    UNIVERSAL ADULT Mental Health DIAGNOSTIC ASSESSMENT    Identifying Information:  Patient is a 63 year old,  individual.  Patient was referred for an assessment by other.  Patient attended the session alone.    Chief Complaint:   The reason for seeking services at this time is: \"Neuropsychological links also with GI-related issues (low Ferritin and Iron intake)\".  The problem(s) began 21.    Patient has attempted to resolve these concerns in the past through medical assessment " ".    Social/Family History:  Patient reported they grew up in other [facility].  They were raised by biological parents  .  Parents were always together.  Patient reported that their childhood was \"lost my dad when I was 8 years old.  My mother never remarried, and did well single-parenting.  She was supportive and always drove us to be number one.\"  Patient described their current relationships with family of origin as \"perfectly fine.  My sister and my wife are not getting along--I have to balance that.\"     The patient describes their cultural background as .  Cultural influences and impact on patient's life structure, values, norms, and healthcare: [place] to the [place] for [place] studies, and followed on as  in Academia (had to work 3 times harder to equalize rewards); early on I in the [place] I felt socially more [place] or ; later as international, fidel. with so many non-[place] students and faculty in Engineering and Technology professions. ;No Congregational influences (baptized as Episcopalian Confucianist; am not atheist).  Contextual influences on patient's health include: Contextual Factors: Family Factors patient's father  when patient was 8, mother was single parent .    These factors will be addressed in the Preliminary Treatment plan. Patient identified their preferred language to be other. Patient reported he does not need the assistance of an  or other support involved in therapy.     Patient reported had no significant delays in developmental tasks.   Patient's highest education level was graduate school  .  Patient identified the following learning problems: none reported.  Modifications will not be used to assist communication in therapy. Patient reports he is  able to understand written materials.    Patient reported the following relationship history one significant relationship outside of marriage.  Patient's current relationship status is  for 35 " years.   Patient identified their sexual orientation as heterosexual.  Patient reported having no child(jalen). Patient identified mother as part of their support system.  Patient identified the quality of these relationships as inconsistent,  .      Patient's current living/housing situation involves staying in own home/apartment.  The immediate members of family and household include Kalli Rivas, 65,spouse and they report that housing is stable.    Patient is currently employed fulltime.  Patient reports their finances are obtained through employment. Patient does not identify finances as a current stressor.      Patient reported that they have not been involved with the legal system.  Patient does not report being under probation/ parole/ jurisdiction. They are not under any current court jurisdiction. .    Patient's Strengths and Limitations:  Patient identified the following strengths or resources that will help them succeed in treatment: commitment to health and well being, insight, intelligence, positive work environment, motivation, strong social skills, and work ethic. Things that may interfere with the patient's success in treatment include: lack of family support.     Assessments:  The following assessments were completed by patient for this visit:  PHQ9:       10/1/2023     5:19 PM   PHQ-9 SCORE   PHQ-9 Total Score MyChart 1 (Minimal depression)   PHQ-9 Total Score 1     GAD2:       10/1/2023     5:20 PM   SEYMOUR-2   Feeling nervous, anxious, or on edge 0   Not being able to stop or control worrying 0   SEYMOUR-2 Total Score 0     CAGE-AID:       10/1/2023     5:23 PM   CAGE-AID Total Score   Total Score 0   Total Score MyChart 0 (A total score of 2 or greater is considered clinically significant)     PROMIS 10-Global Health (all questions and answers displayed):       10/1/2023     5:22 PM   PROMIS 10   In general, would you say your health is: Good   In general, would you say your quality of life is: Good   In  general, how would you rate your physical health? Very good   In general, how would you rate your mental health, including your mood and your ability to think? Good   In general, how would you rate your satisfaction with your social activities and relationships? Fair   In general, please rate how well you carry out your usual social activities and roles Good   To what extent are you able to carry out your everyday physical activities such as walking, climbing stairs, carrying groceries, or moving a chair? Completely   In the past 7 days, how often have you been bothered by emotional problems such as feeling anxious, depressed, or irritable? Rarely   In the past 7 days, how would you rate your fatigue on average? None   In the past 7 days, how would you rate your pain on average, where 0 means no pain, and 10 means worst imaginable pain? 0   In general, would you say your health is: 3   In general, would you say your quality of life is: 3   In general, how would you rate your physical health? 4   In general, how would you rate your mental health, including your mood and your ability to think? 3   In general, how would you rate your satisfaction with your social activities and relationships? 2   In general, please rate how well you carry out your usual social activities and roles. (This includes activities at home, at work and in your community, and responsibilities as a parent, child, spouse, employee, friend, etc.) 3   To what extent are you able to carry out your everyday physical activities such as walking, climbing stairs, carrying groceries, or moving a chair? 5   In the past 7 days, how often have you been bothered by emotional problems such as feeling anxious, depressed, or irritable? 2   In the past 7 days, how would you rate your fatigue on average? 1   In the past 7 days, how would you rate your pain on average, where 0 means no pain, and 10 means worst imaginable pain? 0   Global Mental Health Score 12    Global Physical Health Score 19   PROMIS TOTAL - SUBSCORES 31     Ocean Suicide Severity Rating Scale (Lifetime/Recent)      10/2/2023     3:00 PM   Ocean Suicide Severity Rating (Lifetime/Recent)   Q1 Wish to be Dead (Lifetime) N   Q2 Non-Specific Active Suicidal Thoughts (Lifetime) N   Actual Attempt (Lifetime) N   Has subject engaged in non-suicidal self-injurious behavior? (Lifetime) N   Interrupted Attempts (Lifetime) N   Aborted or Self-Interrupted Attempt (Lifetime) N   Preparatory Acts or Behavior (Lifetime) N   Calculated C-SSRS Risk Score (Lifetime/Recent) No Risk Indicated       Personal and Family Medical History:  Patient does not report a family history of mental health concerns.  Patient reports family history includes Diabetes in his father and mother..     Patient does not report Mental Health Diagnosis or Treatment.      Patient has had a physical exam to rule out medical causes for current symptoms.  Date of last physical exam was within the past year. Client was encouraged to follow up with PCP if symptoms were to develop. The patient has a [facility] Care Provider, who is named Jocelin Hu.  Patient reports the following current medical concerns: diabetes, low iron .  Patient denies any issues with pain..   There are not significant appetite / nutritional concerns / weight changes.   Patient does not report a history of head injury / trauma / cognitive impairment.    Patient reports current meds as:   No outpatient medications have been marked as taking for the 10/2/23 encounter (Virtual Visit) with Lavelle Rolle LMFT.     Current Facility-Administered Medications for the 10/2/23 encounter (Virtual Visit) with Lavelle Rolle LMFT   Medication    lidocaine 1 % injection 2 mL       Medication Adherence:  Patient reports taking.  taking prescribed medications as prescribed.    Patient Allergies:  No Known Allergies    Medical History:    Past Medical  History:   Diagnosis Date    Diabetes (H)          Current Mental Status Exam:   Appearance:  Appropriate    Eye Contact:  Good   Psychomotor:  Normal       Gait / station:  no problem  Attitude / Demeanor: Cooperative  Interested Friendly Pleasant Attentive  Speech      Rate / Production: Normal/ Responsive Talkative      Volume:  Normal  volume      Language:  intact  Mood:   Anxious  Normal  Affect:   Appropriate  Expansive  Worrisome    Thought Content: Clear  Rumination   Thought Process: Coherent  Goal Directed  Logical       Associations: No loosening of associations  Insight:   Good  and Intellectual Insight  Judgment:  Intact   Orientation:  All  Attention/concentration: Good    Substance Use:  Patient did not report a family history of substance use concerns; see medical history section for details.  Patient has not received chemical dependency treatment in the past.  Patient has not ever been to detox.      Patient is not currently receiving any chemical dependency treatment.           Substance History of use Age of first use Date of last use     Pattern and duration of use (include amounts and frequency)   Alcohol currently use   18 09/23/23 REPORTS SUBSTANCE USE: reports using substance 1 times per day and has 1 drink at a time.   Patient reports heaviest use was in the past.   Cannabis   never used     REPORTS SUBSTANCE USE: N/A     Amphetamines   never used     REPORTS SUBSTANCE USE: N/A   Cocaine/crack    never used       REPORTS SUBSTANCE USE: N/A   Hallucinogens never used         REPORTS SUBSTANCE USE: N/A   Inhalants never used         REPORTS SUBSTANCE USE: N/A   Heroin never used         REPORTS SUBSTANCE USE: N/A   Other Opiates never used     REPORTS SUBSTANCE USE: N/A   Benzodiazepine   never used     REPORTS SUBSTANCE USE: N/A   Barbiturates never used     REPORTS SUBSTANCE USE: N/A   Over the counter meds never used     REPORTS SUBSTANCE USE: N/A   Caffeine currently use 16   REPORTS  SUBSTANCE USE: reports using substance 3-4 times per day and has 1 drink at a time.   Patient reports heaviest use was in the past.   Nicotine  used in the past 20 01/01/12 REPORTS SUBSTANCE USE: N/A   Other substances not listed above:  Identify:  never used     REPORTS SUBSTANCE USE: N/A     Patient reported the following problems as a result of their substance use: relationship problems.    Substance Use: No symptoms    Based on the negative CAGE score and clinical interview there  are not indications of drug or alcohol abuse.    Significant Losses / Trauma / Abuse / Neglect Issues:   Patient did not serve in the .  There are indications or report of significant loss, trauma, abuse or neglect issues related to: death of father when patient was 8 .  Concerns for possible neglect are not present.    Safety Assessment:   Patient denies current homicidal ideation and behaviors.  Patient denies current self-injurious ideation and behaviors.    Patient denied risk behaviors associated with substance use.  Patient denies any high risk behaviors associated with mental health symptoms.  Patient reports the following current concerns for their personal safety: None.  Patient reports there are not firearms in the house.    History of Safety Concerns:  Patient denied a history of homicidal ideation.     Patient denied a history of personal safety concerns.    Patient denied a history of assaultive behaviors.    Patient denied a history of sexual assault behaviors.     Patient denied a history of risk behaviors associated with substance use.  Patient denies any history of high risk behaviors associated with mental health symptoms.  Patient reports the following protective factors: forward or future oriented thinking; dedication to family or friends; regular physical activity; sense of belonging; purpose; help seeking behaviors when distressed; daily obligations; effective problem solving skills; commitment to well  "being; sense of meaning; positive social skills; strong sense of self worth or esteem; sense of personal control or determination    Risk Plan:  See Recommendations for Safety and Risk Management Plan    Review of Symptoms per patient report:   Depression: Feelings of hopelessness and Feeling sad, down, or depressed  Alla:  No Symptoms  Psychosis: No Symptoms  Anxiety: Excessive worry and Ruminations  Panic:  No symptoms  Post Traumatic Stress Disorder:  Experienced traumatic event death of father at age 8.    Eating Disorder: No Symptoms  ADD / ADHD:  No symptoms  Conduct Disorder: No symptoms  Autism Spectrum Disorder: No symptoms  Obsessive Compulsive Disorder: No Symptoms    Patient reports the following compulsive behaviors and treatment history:  None .      Diagnostic Criteria:   Adjustment Disorder  A. The development of emotional or behavioral symptoms in response to an identifiable stressor(s) occurring within 3 months of the onset of the stressor(s)  B. These symptoms or behaviors are clinically significant, as evidenced by one or both of the following:       - Significant impairment in social, occupational, or other important areas of functioning  C. The stress-related disturbance does not meet criteria for another disorder & is not not an exacerbation of another mental disorder  D. The symptoms do not represent normal bereavement  E. Once the stressor or its consequences have terminated, the symptoms do not persist for more than an additional 6 months       * Adjustment Disorder with Mixed Anxiety and Depressed Mood: The predominant manifestation is a combination of depression and anxiety    Functional Status:  Patient reports the following functional impairments:  home life with spouse and relationship(s).     Nonprogrammatic care:  Patient is requesting basic services to address current mental health concerns.    Clinical Summary:  1. Reason for assessment: \"Neuropsychological links also with " "GI-related issues (low Ferritin and Iron intake)\"  .  2. Psychosocial, Cultural and Contextual Factors: .  Cultural influences and impact on patient's life structure, values, norms, and healthcare: [place] to [place] for [place], and followed on as  in Academia (had to work 3 times harder to equalize rewards); early on I in the [place] I felt socially more [place] or ; later as international, fidel. with so many non-[place] students and faculty in Engineering and Technology professions. ;No Cheondoism influences (baptized as Latter day Hinduism; am not atheist).  Contextual influences on patient's health include: Contextual Factors: Family Factors patient's father  when patient was 8, mother was single parent  .  3. Principal DSM5 Diagnoses  (Sustained by DSM5 Criteria Listed Above):   Adjustment Disorders  309.28 (F43.23) With mixed anxiety and depressed mood.  4. Other Diagnoses that is relevant to services:  None.  5. Provisional Diagnosis:   None.  6. Prognosis: Expect Improvement, Relieve Acute Symptoms, and Maintain Current Status / Prevent Deterioration.  7. Likely consequences of symptoms if not treated: further deterioration resulting in increased adverse impact on interpersonal/life functioning and need for higher level of care.  8. Client strengths include:  educated, employed, goal-focused, good listener, insightful, intelligent, motivated, open to learning, open to suggestions / feedback, wants to learn, willing to ask questions, willing to relate to others, and work history .     Recommendations:     1. Plan for Safety and Risk Management:   Safety and Risk: Recommended that patient call 911 or go to the local ED should there be a change in any of these risk factors..          Report to child / adult protection services was NA.     2. Patient's identified mental health concerns with a cultural influence will be addressed by processing in therapy as needed .     3. Initial " Treatment will focus on:    Anxiety - increase calm mindset  Adjustment Difficulties related to: family concerns and health concerns .     4. Resources/Service Plan:    services are not indicated.   Modifications to assist communication are not indicated.   Additional disability accommodations are not indicated.      5. Collaboration:   Collaboration / coordination of treatment will be initiated with the following  support professionals: primary care physician.      6.  Referrals:   The following referral(s) will be initiated:  None at this time . Next Scheduled Appointment: 10/17/23.      A Release of Information has been obtained for the following:  None needed at this time .     Clinical Substantiation/medical necessity for the above recommendations:  N/A.    7. TRAM:    TRAM:  Discussed the general effects of drugs and alcohol on health and well-being. Recommendations:  N/A.     8. Records:   These were reviewed at time of assessment.   Information in this assessment was obtained from the medical record and  provided by patient who is a good historian.    Patient will have open access to their mental health medical record.    9.   Interactive Complexity: No    10. Safety Plan:  Patient denied any current/recent/lifetime history of suicidal ideation and/or behaviors.  No safety plan indicated at this time.     Provider Name/ Credentials:  Eduardo Rolle M.A., FT  October 2, 2023

## 2023-10-02 NOTE — PROGRESS NOTES
GI CLINIC VISIT    CC/REFERRING MD:  Cheri Watson  REASON FOR CONSULTATION:   Cheri Watson for   Chief Complaint   Patient presents with    Follow Up       ASSESSMENT/PLAN:  64yo M w/ PMH of DMII and HLD who is presenting for DANIEL.     #DANIEL  Currently resolved, although the patient did receive an iron infusion in early August (today's iron studies and CBC are wnl); he never had overt GI bleeding. Most likely, the etiology of the initial DANIEL was related to the AVMs seen on colonoscopy, possibly exacerbated by the ASA he was taking for primary prevention which he has since stopped. On his recent video capsule endoscopy, the patient did have abnormal-appearing villi in the distal duodenum/prox jejunum with venous structures although will defer further work-up of this with push enteroscopy unless he develops e/o bleeding again  - Repeat CBC and iron studies in December per patient request, although these checks can be spaced out     #Multifocal intestinal metaplasia (incomplete and complete)    No personal or family hx of gastric adenocarcinoma; s/p mapping biopsies in March; H. Pylori negative   - No further follow-up recommended     #CRC screening  Colonoscopy in March was not sufficient for surveillance given that it was diagnostic. Reports he had a colonoscopy in 2019 although I cannot see these results in Epic.  - Colonoscopy for surveillance within the next year     RTC: PRN       60 minutes spent on the date of the encounter performing chart review, history and exam, documentation and further activities as noted above.  .    Patient was seen and d/w Dr. Irvin Singleton MD  GI Fellow        HPI  64yo M w/ PMH of DMII and HLD who is presenting for DANIEL.     The patient has a hx of DANIEL which has been worked up for with EGDs, colonoscopy, and video capsule endoscopy. He has received three iron infusions total (last was early August), and today's iron studies and CBC are wnl. The patient is asymptomatic  with no melena, hematochezia, hematemesis, hematuria, weakness, or nosebleeds. He is not on oral iron.     ROS:    Negative except per HPI     PROBLEM LIST  Patient Active Problem List    Diagnosis Date Noted    Anemia, iron deficiency 05/04/2023     Priority: Medium    Chest pain 07/28/2022     Priority: Medium    Family history of premature coronary heart disease 07/28/2022     Priority: Medium     Formatting of this note might be different from the original.  Father had myocardial infarction at age 46.      Pain of right hand 04/14/2017     Priority: Medium    Adenomatous polyp of colon 11/21/2016     Priority: Medium    Goiter 08/19/2016     Priority: Medium    Diabetes mellitus (H) 01/18/2013     Priority: Medium    Coronary atherosclerosis 08/30/2012     Priority: Medium     Formatting of this note might be different from the original.  Obstructive lesion in the ostium of the second diagonal - small vessel seen in Angiogram June 2012  Sequential lesions in the right coronary artery, sees Dr Tanmay Rush of [facility] clinic [phone]  ; Coronary artery disease  Formatting of this note might be different from the original.  Calcium score 721 in December 2011.      Lesion of mouth 09/01/2010     Priority: Medium     Formatting of this note might be different from the original.  Uvula Papilloma      Hyperlipidemia 01/02/2008     Priority: Medium     Formatting of this note might be different from the original.  ICD 10      Type 2 diabetes mellitus (H) 06/17/2005     Priority: Medium     Formatting of this note might be different from the original.  Careplan:  Background:  he sees  Endocrine MD at [facility]    Goals/Recommendations:  January 2013  - goal to lose 10 pounds to see if will improve glucose control  initial goal 189 #    ; Type II or unspecified type diabetes mellitus without mention of complication, not stated as uncontrolled (HR)  Formatting of this note might be different  from the original.  a system change updated this record. This will not affect patient care or billing. This comment can be deleted.         PERTINENT PAST MEDICAL HISTORY:  Past Medical History:   Diagnosis Date    Diabetes (H)        PREVIOUS SURGERIES:  Past Surgical History:   Procedure Laterality Date    CAPSULE/PILL CAM ENDOSCOPY N/A 5/15/2023    Procedure: Capsule/pill cam endoscopy;  Surgeon: Carlyle Underwood MD;  Location: [facility]    COLONOSCOPY N/A 3/8/2023    Procedure: Colonoscopy with APC;  Surgeon: Carlyle Zelaya MD;  Location: [facility]    ESOPHAGOSCOPY, GASTROSCOPY, DUODENOSCOPY (EGD), COMBINED N/A 3/8/2023    Procedure: Esophagoscopy, gastroscopy, duodenoscopy (EGD), combined;  Surgeon: Carlyle Zelaya MD;  Location: [facility]    ESOPHAGOSCOPY, GASTROSCOPY, DUODENOSCOPY (EGD), COMBINED N/A 5/3/2023    Procedure: Esophagoscopy, gastroscopy, duodenoscopy (EGD), combined;  Surgeon: Carlyle Zelaya MD;  Location: [facility]       PREVIOUS ENDOSCOPY:  Procedure:             Colonoscopy 3/8/23  Indications:           Iron deficiency anemia   Providers:             CARLYLE ZELAYA MD, Tete Barclay MD:          MAGGIE REYES MD   Medicines:             Fentanyl 50 micrograms IV, Total sedation time: 28                          minutes of continuous bedside 1:1 monitoring. See EGD                          note for additional medications and time.   Complications:         No immediate complications.   Impression:            - Hemorrhoids found on perianal exam.                          - The examined portion of the ileum was normal.                          - A single non-bleeding colonic angioectasia. Treated                          with argon plasma coagulation (APC).                          - A single non-bleeding colonic angioectasia. Treated                          with argon plasma coagulation (APC).                          - The distal rectum and anal verge are  normal on                          retroflexion view.                          - No specimens collected.   Recommendation:        - Discharge patient to home (ambulatory).                          - Follow-up with Lore Barber in GI clinic as scheduled.                          - See EGD note.                          Repeat colonoscopy for surveillance based on findings                          of most recent colonsocopy in 2019, which I cannot                          find in Epic.     Procedure:             Upper GI endoscopy 3/8/23  Indications:           Iron deficiency anemia   Providers:             CARLYLE ZELAYA MD, Tete Moore   Referring MD:          LORE BARBER, MAGGIE COUCH MD   Medicines:             Midazolam 2 mg IV, Fentanyl 100 micrograms IV, Total                          sedation time: 16 minutes of continuous bedside 1:1                          monitoring.   Complications:         No immediate complications.   Findings:       The esophagus was normal.        Diffuse mildly erythematous mucosa without bleeding was found in the        entire examined stomach. Biopsies were taken with a cold forceps for        histology.        One non-bleeding superficial gastric ulcer with no stigmata of bleeding        was found in the prepyloric region of the stomach. The lesion was 4 mm        in largest dimension. Biopsies were taken with a cold forceps for        histology.        Patchy mucosal variance characterized by white specks was found in the        prepyloric region of the stomach. Biopsies were taken with a cold        forceps for histology.        The examined duodenum was normal. Biopsies for histology were taken with        a cold forceps for evaluation of celiac disease.        The cardia and gastric fundus were normal on retroflexion.                                                                                    Impression:            - Normal esophagus.                          -  Erythematous mucosa in the stomach. Biopsied.                          - Non-bleeding gastric ulcer with no stigmata of                          bleeding. Biopsied.                          - Gastric mucosal variant with pale mucosa in the                          prepyloric antrum. May indicate intestinal metaplasia.                          Biopsied here and then mapping biopsies performed.                          - Normal examined duodenum. Biopsied.   Recommendation:        - Await pathology results.                          - Use Prilosec (omeprazole) 20 mg PO daily for 2                          months.                          - Repeat upper endoscopy in 2 months to check healing.                          - See colonoscopy report.                          - The pt was instruced to check with his primary care                          provider re whether aspirin is medically necessary or                          can be stopped in light of the gastric ulcer.                          - Follow-up with Jennifer Mclain as scheduled.   Addendum   Immunoperoxidase stains for Helicobacter pylori are performed on blocks B-G and are all negative (appropriate controls obtained).   Addendum electronically signed by Tanmay Angeles MD on 3/12/2023 at 11:02 AM   Final Diagnosis   A(1). DUODENAL BIOPSY:  -Duodenal mucosa with no significant histopathologic abnormality  -No evidence of celiac disease     B(2). PERIPYLORIC REGION, BIOPSY:  -Gastric antral mucosa with mild chronic inactive gastritis and multifocal intestinal metaplasia (complete and incomplete type)  -Negative for dysplasia     C(3). ANTRUM GREATER CURVATURE, BIOPSY:  -Gastric antral and body type mucosa with chronic inactive gastritis   -Negative for intestinal metaplasia and dysplasia     D(4). ANTRUM LESSER CURVATURE, BIOPSY:  -Gastric antral mucosa with chronic inactive gastritis   -Negative for intestinal metaplasia and dysplasia     E(5).  INCISURA, BIOPSY:  -Gastric body type mucosa with chronic inactive gastritis   -Negative for intestinal metaplasia and dysplasia     F(6). BODY GREATER CURVATURE, BIOPSY:  -Gastric body type mucosa with chronic inactive gastritis   -Negative for intestinal metaplasia and dysplasia     G(7). BODY LESSER CURVATURE, BIOPSY:  -Gastric body type mucosa with chronic inactive gastritis   -Negative for intestinal metaplasia and dysplasia     Procedure:             Upper GI endoscopy 5/3/23  Indications:           Follow-up of gastric ulcer   Providers:             CARLYLE ZELAYA MD, Karina Baer, RN, Qamar Zarate RN   Referring MD:          MAGGIE REYES MD   Medicines:             Midazolam 2 mg IV, Fentanyl 100 micrograms IV,                          Benzocaine spray, Total sedation time: 9 minutes of                          continuous bedside 1:1 monitoring.   Complications:         No immediate complications.   Findings:       The esophagus was normal.        Diffuse moderately erythematous mucosa without bleeding was found in the        entire examined stomach. Biopsies were taken with a cold forceps for        histology.        A 4 mm healed ulcer was found in the prepyloric region of the stomach.        This mucosa appeared reepithelialized but pale. Biopsies were taken with        a cold forceps for histology.        The examined duodenum was normal.        The cardia and gastric fundus were normal on retroflexion.                                                                                    Impression:            - Normal esophagus.                          - Diffusely erythematous mucosa in the stomach.                          Biopsied again given refractory iron deficiency. Prior                          mapping biopsies showed chronic inactive gastritis and                          no H pylori or intestinal metaplasia.                          - Scar in the  prepyloric region of the stomach.                          Biopsied given the refractory iron deficiency.                          - Normal examined duodenum.   Recommendation:        - Await pathology results.                          - Return to referring physician/GI clinic.                          If biopsies fail to show an etiology for iron                          deficiency, then would recommend consideration of                          capsule endoscopy. Message sent to Jennifer Barber.     Final Diagnosis   A. GASTRIC ANTRUM SCAR, BIOPSY:  - Gastric antral mucosa with mild chronic inactive gastritis, background reactive gastropathy with intestinal metaplasia (complete)  - No H. pylori-like organisms identified on routine staining   - Negative for dysplasia    B.  STOMACH, BIOPSIES:  - Gastric oxyntic mucosa with chronic inactive gastritis  - No H. pylori-like organisms identified on routine stain  - Negative for intestinal metaplasia and dysplasia       Procedure:             Video capsule endoscopy 5/18/23  Indications:           Iron deficiency anemia   Providers:             CARLYLE VILLALOBOS MD   Referring MD:          JENNIFER BARBER   Medicines:             None   Complications:         No immediate complications.   Findings:       Images of the esophagus, stomach and small bowel were obtained from the        swallowed capsule and reviewed.        The gastric passage time of the capsule enteroscope was 0-hours        16-minutes.        The small bowel passage time of the capsule enteroscope was 2-hours        56-minutes. The capsule entered the colon.        A localized area of abnormal mucosa with some edema and flattene villi        in the small bowel (proximal small bowel) at 26 mins, 10 mins past        pylorus.        A few venous structures with no stigmata of recent bleeding were seen in        the small bowel (mid small bowel). They were small.        Lymphangiectasia was found in the small bowel (mid  small bowel).        Exam of the small bowel was otherwise normal.                                                                                    Impression:            - Localized, relatively short, area of abnormal villi                          in the distal duodenum/proximal jejunum.                          - A few venous structures with no stigmata of recent                          bleeding in the small bowel                          - Small bowel lymphangiectasia.                          - No obvious explanation for iron deficiency found   Recommendation:        - Push Enteroscopy to assess area of abnormal villi.                          - Check celiac serologies if not already done so.                          - Resume previous diet.                          - Return to referring physician.                                                                                 ALLERGIES:   No Known Allergies    PERTINENT MEDICATIONS:    Current Outpatient Medications:     empagliflozin (JARDIANCE) 25 MG TABS tablet, Take 1 tablet (25 mg) by mouth daily, Disp: 90 tablet, Rfl: 4    fish oil-omega-3 fatty acids 500 MG capsule, Take 500 mg by mouth daily, Disp: , Rfl:     melatonin 1 MG TABS tablet, Take 1 mg by mouth At Bedtime, Disp: , Rfl:     metFORMIN (GLUCOPHAGE) 1000 MG tablet, Take 1 tablet (1,000 mg) by mouth 2 times daily (with meals), Disp: 180 tablet, Rfl: 3    rosuvastatin (CRESTOR) 10 MG tablet, Take 2 tablets (20 mg) by mouth daily, Disp: 180 tablet, Rfl: 3    UNABLE TO FIND, Take 22 mg by mouth daily Raw Iron, with B12, vitamin C and folate (Patient not taking: Reported on 8/2/2023), Disp: , Rfl:     Current Facility-Administered Medications:     lidocaine 1 % injection 2 mL, 2 mL, INTRA-ARTICULAR, Once, Camila Baeza MD    SOCIAL HISTORY:  Social History     Socioeconomic History    Marital status:      Spouse name: Not on file    Number of children: Not on file    Years of  "education: Not on file    Highest education level: Not on file   Occupational History    Not on file   Tobacco Use    Smoking status: Former     Packs/day: 1.00     Types: Cigarettes, Cigars     Start date: 10/10/1978     Quit date: 2011     Years since quittin.7    Smokeless tobacco: Former   Substance and Sexual Activity    Alcohol use: No    Drug use: No    Sexual activity: Yes     Partners: Female   Other Topics Concern    Parent/sibling w/ CABG, MI or angioplasty before 65F 55M? Not Asked   Social History Narrative    Not on file     Social Determinants of Health     Financial Resource Strain: Not on file   Food Insecurity: Not on file   Transportation Needs: Not on file   Physical Activity: Not on file   Stress: Not on file   Social Connections: Not on file   Interpersonal Safety: Not on file   Housing Stability: Not on file       FAMILY HISTORY:  Family History   Problem Relation Age of Onset    Diabetes Mother     Diabetes Father     Cancer No family hx of         no skin cancer    Glaucoma No family hx of     Macular Degeneration No family hx of        Past/family/social history reviewed and no changes    PHYSICAL EXAMINATION:  Constitutional: aaox3, cooperative, pleasant, not dyspneic/diaphoretic, no acute distress  Vitals reviewed: /69   Pulse 95   Ht 1.778 m (5' 10\")   Wt 76.2 kg (168 lb 1.6 oz)   SpO2 95%   BMI 24.12 kg/m    Wt:   Wt Readings from Last 2 Encounters:   23 76.2 kg (168 lb 1.6 oz)   23 75.8 kg (167 lb)      Eyes: Sclera anicteric/injected  Ears/nose/mouth/throat: hearing intact  Neck: supple, thyroid normal size  CV: No edema  Respiratory: Unlabored breathing  Abd:  Nondistended,  no hepatosplenomegaly, nontender, no peritoneal signs  Skin: warm, perfused, no jaundice  Psych: Normal affect  MSK: Normal gait         PERTINENT STUDIES:    Lab on 2023   Component Date Value Ref Range Status    Hemoglobin A1C 2023 7.6 (H)  <5.7 % Final    WBC Count " 09/26/2023 5.3  4.0 - 11.0 10e3/uL Final    RBC Count 09/26/2023 5.05  4.40 - 5.90 10e6/uL Final    Hemoglobin 09/26/2023 15.6  13.3 - 17.7 g/dL Final    Hematocrit 09/26/2023 43.7  40.0 - 53.0 % Final    MCV 09/26/2023 87  78 - 100 fL Final    MCH 09/26/2023 30.9  26.5 - 33.0 pg Final    MCHC 09/26/2023 35.7  31.5 - 36.5 g/dL Final    RDW 09/26/2023 12.1  10.0 - 15.0 % Final    Platelet Count 09/26/2023 178  150 - 450 10e3/uL Final    Iron 09/26/2023 121  61 - 157 ug/dL Final    Iron Binding Capacity 09/26/2023 332  240 - 430 ug/dL Final    Iron Sat Index 09/26/2023 36  15 - 46 % Final    Ferritin 09/26/2023 96  31 - 409 ng/mL Final

## 2023-10-04 DIAGNOSIS — E11.9 TYPE 2 DIABETES MELLITUS WITHOUT COMPLICATION, WITHOUT LONG-TERM CURRENT USE OF INSULIN (H): ICD-10-CM

## 2023-10-05 RX ORDER — ROSUVASTATIN CALCIUM 10 MG/1
10 TABLET, COATED ORAL DAILY
Qty: 90 TABLET | Refills: 3 | OUTPATIENT
Start: 2023-10-05

## 2023-11-02 ENCOUNTER — TELEPHONE (OUTPATIENT)
Dept: GASTROENTEROLOGY | Facility: CLINIC | Age: 63
End: 2023-11-02
Payer: COMMERCIAL

## 2023-11-21 DIAGNOSIS — H35.362 MACULAR DRUSEN, LEFT: Primary | ICD-10-CM

## 2023-11-30 ENCOUNTER — LAB (OUTPATIENT)
Dept: LAB | Facility: CLINIC | Age: 63
End: 2023-11-30
Payer: COMMERCIAL

## 2023-11-30 ENCOUNTER — OFFICE VISIT (OUTPATIENT)
Dept: OPHTHALMOLOGY | Facility: CLINIC | Age: 63
End: 2023-11-30
Attending: OPHTHALMOLOGY
Payer: COMMERCIAL

## 2023-11-30 DIAGNOSIS — E11.9 TYPE 2 DIABETES MELLITUS WITHOUT COMPLICATION, WITHOUT LONG-TERM CURRENT USE OF INSULIN (H): ICD-10-CM

## 2023-11-30 DIAGNOSIS — H35.362 MACULAR DRUSEN, LEFT: ICD-10-CM

## 2023-11-30 DIAGNOSIS — K92.2 GASTROINTESTINAL HEMORRHAGE, UNSPECIFIED GASTROINTESTINAL HEMORRHAGE TYPE: ICD-10-CM

## 2023-11-30 LAB
BASOPHILS # BLD AUTO: 0 10E3/UL (ref 0–0.2)
BASOPHILS NFR BLD AUTO: 0 %
EOSINOPHIL # BLD AUTO: 0.1 10E3/UL (ref 0–0.7)
EOSINOPHIL NFR BLD AUTO: 2 %
ERYTHROCYTE [DISTWIDTH] IN BLOOD BY AUTOMATED COUNT: 11.5 % (ref 10–15)
FERRITIN SERPL-MCNC: 68 NG/ML (ref 31–409)
HCT VFR BLD AUTO: 44.4 % (ref 40–53)
HGB BLD-MCNC: 15.2 G/DL (ref 13.3–17.7)
IMM GRANULOCYTES # BLD: 0 10E3/UL
IMM GRANULOCYTES NFR BLD: 0 %
IRON BINDING CAPACITY (ROCHE): 314 UG/DL (ref 240–430)
IRON SATN MFR SERPL: 32 % (ref 15–46)
IRON SERPL-MCNC: 101 UG/DL (ref 61–157)
LYMPHOCYTES # BLD AUTO: 1.7 10E3/UL (ref 0.8–5.3)
LYMPHOCYTES NFR BLD AUTO: 36 %
MCH RBC QN AUTO: 30.4 PG (ref 26.5–33)
MCHC RBC AUTO-ENTMCNC: 34.2 G/DL (ref 31.5–36.5)
MCV RBC AUTO: 89 FL (ref 78–100)
MONOCYTES # BLD AUTO: 0.6 10E3/UL (ref 0–1.3)
MONOCYTES NFR BLD AUTO: 13 %
NEUTROPHILS # BLD AUTO: 2.3 10E3/UL (ref 1.6–8.3)
NEUTROPHILS NFR BLD AUTO: 49 %
PLATELET # BLD AUTO: 173 10E3/UL (ref 150–450)
RBC # BLD AUTO: 5 10E6/UL (ref 4.4–5.9)
WBC # BLD AUTO: 4.7 10E3/UL (ref 4–11)

## 2023-11-30 PROCEDURE — 99213 OFFICE O/P EST LOW 20 MIN: CPT | Performed by: OPHTHALMOLOGY

## 2023-11-30 PROCEDURE — 83550 IRON BINDING TEST: CPT

## 2023-11-30 PROCEDURE — 92134 CPTRZ OPH DX IMG PST SGM RTA: CPT | Performed by: OPHTHALMOLOGY

## 2023-11-30 PROCEDURE — 99214 OFFICE O/P EST MOD 30 MIN: CPT | Performed by: OPHTHALMOLOGY

## 2023-11-30 PROCEDURE — 36415 COLL VENOUS BLD VENIPUNCTURE: CPT

## 2023-11-30 PROCEDURE — 83540 ASSAY OF IRON: CPT

## 2023-11-30 PROCEDURE — 99207 FUNDUS PHOTOS OU (BOTH EYES): CPT | Mod: 26 | Performed by: OPHTHALMOLOGY

## 2023-11-30 PROCEDURE — 80061 LIPID PANEL: CPT

## 2023-11-30 PROCEDURE — 82728 ASSAY OF FERRITIN: CPT

## 2023-11-30 PROCEDURE — 92250 FUNDUS PHOTOGRAPHY W/I&R: CPT | Performed by: OPHTHALMOLOGY

## 2023-11-30 PROCEDURE — 85025 COMPLETE CBC W/AUTO DIFF WBC: CPT

## 2023-11-30 ASSESSMENT — VISUAL ACUITY
CORRECTION_TYPE: GLASSES
METHOD: SNELLEN - LINEAR
OD_CC+: -1
OS_CC: 20/30
OS_CC+: -2
OD_CC: 20/25

## 2023-11-30 ASSESSMENT — SLIT LAMP EXAM - LIDS
COMMENTS: DERMATOCHALASIS UPPER LID
COMMENTS: DERMATOCHALASIS UPPER LID

## 2023-11-30 ASSESSMENT — CONF VISUAL FIELD
OD_SUPERIOR_TEMPORAL_RESTRICTION: 0
OD_NORMAL: 1
OD_SUPERIOR_NASAL_RESTRICTION: 0
OS_NORMAL: 1
OS_INFERIOR_TEMPORAL_RESTRICTION: 0
OS_INFERIOR_NASAL_RESTRICTION: 0
METHOD: COUNTING FINGERS
OS_SUPERIOR_NASAL_RESTRICTION: 0
OD_INFERIOR_NASAL_RESTRICTION: 0
OD_INFERIOR_TEMPORAL_RESTRICTION: 0
OS_SUPERIOR_TEMPORAL_RESTRICTION: 0

## 2023-11-30 ASSESSMENT — EXTERNAL EXAM - LEFT EYE: OS_EXAM: NORMAL

## 2023-11-30 ASSESSMENT — REFRACTION_WEARINGRX
OD_CYLINDER: +1.00
OS_AXIS: 058
OS_CYLINDER: +1.50
OD_AXIS: 105
OD_SPHERE: -4.25
OD_ADD: +2.75
OS_ADD: +2.75
OS_SPHERE: -8.50

## 2023-11-30 ASSESSMENT — TONOMETRY
IOP_METHOD: TONOPEN
OS_IOP_MMHG: 14
OD_IOP_MMHG: 17

## 2023-11-30 ASSESSMENT — CUP TO DISC RATIO
OS_RATIO: 0.3
OD_RATIO: 0.4

## 2023-11-30 ASSESSMENT — EXTERNAL EXAM - RIGHT EYE: OD_EXAM: NORMAL

## 2023-11-30 NOTE — NURSING NOTE
Chief Complaints and History of Present Illnesses   Patient presents with    Annual Eye Exam     Chief Complaint(s) and History of Present Illness(es)       Annual Eye Exam              Laterality: both eyes    Course: stable    Associated symptoms: Negative for eye pain, flashes and floaters              Comments    Here for annual exam. VA is the same. No flashes or floaters. No pain.    Sebastian Le COT 9:52 AM November 30, 2023

## 2023-11-30 NOTE — PROGRESS NOTES
I have confirmed the patient's and reviewed Past Medical History, Past Surgical History, Social History, Family History, Problem List, Medication List and agree with Tech note.        CC -   Diabetic follow-up, referred    INTERVAL HISTORY - Initial visit    [facility] -   Chema HOWARD Jamel is a  63 year old year-old patient with history of diabetes (Type 2 - 14 years). Last A1c 7.0%. Currently taking metformin and jardiance. Doing well. No flashes or floaters.       PAST OCULAR SURGERY  none    RETINAL IMAGING:  OCT 10/24/22   OD - normal, PHF attached, thin choroid  OS - normal, PHF attached, thin choroid      ASSESSMENT & PLAN     diabetes without retinopathy  - recommend A1c < 7.2%  - Continue good BS control    2.  cataracts each eye   - monitor    3.  dermatochalasis each eye   - monitor     #4. high myopia  - monitor  - discussed risk of RD/RT     return to clinic: 1 year VTD Opt[employer] OCT          Marcin Nieves MD PhD.  Professor & Chair

## 2023-12-01 ENCOUNTER — TELEPHONE (OUTPATIENT)
Dept: ENDOCRINOLOGY | Facility: CLINIC | Age: 63
End: 2023-12-01
Payer: COMMERCIAL

## 2023-12-01 DIAGNOSIS — E11.9 TYPE 2 DIABETES MELLITUS WITHOUT COMPLICATION, WITHOUT LONG-TERM CURRENT USE OF INSULIN (H): Primary | ICD-10-CM

## 2023-12-01 LAB
CHOLEST SERPL-MCNC: 137 MG/DL
HDLC SERPL-MCNC: 40 MG/DL
LDLC SERPL CALC-MCNC: 67 MG/DL
NONHDLC SERPL-MCNC: 97 MG/DL
TRIGL SERPL-MCNC: 151 MG/DL

## 2023-12-01 NOTE — TELEPHONE ENCOUNTER
Pt would like to recheck lipid and cgm. Will add lipid to recent blood draw.    ADDENDUM: insurance says CGM needs insulin for coverage

## 2024-01-05 ENCOUNTER — TELEPHONE (OUTPATIENT)
Dept: NEUROLOGY | Facility: CLINIC | Age: 64
End: 2024-01-05
Payer: COMMERCIAL

## 2024-01-05 NOTE — TELEPHONE ENCOUNTER
Left Voicemail (1st Attempt) and Sent Mychart (1st Attempt) for the patient to call back and schedule the following:    Appointment type: Return Neurology (in person)  Provider: Forrest  Return date: Around 8/2/24  Specialty phone number: [phone]  Additional appointment(s) needed: N/A  Additional Notes: N/A    Beau Hollingsworth on 1/5/2024 at 2:38 PM

## 2024-01-05 NOTE — TELEPHONE ENCOUNTER
Left Voicemail (1st Attempt) and Sent Danny (1st Attempt) for the patient to call back and schedule the following:    Appointment type: RETURN NEUROLOGY   Provider: ABY   Return date: AUGUST 2024  Specialty phone number: [phone]  Additional appointment(s) needed: NONE  Additonal Notes: LVM, SENT JAHAIRA Felipe on 1/5/2024 at 2:40 PM

## 2024-01-09 ENCOUNTER — TELEPHONE (OUTPATIENT)
Dept: NEUROLOGY | Facility: CLINIC | Age: 64
End: 2024-01-09
Payer: COMMERCIAL

## 2024-01-29 ENCOUNTER — HOSPITAL ENCOUNTER (OUTPATIENT)
Facility: CLINIC | Age: 64
End: 2024-01-29
Attending: INTERNAL MEDICINE | Admitting: INTERNAL MEDICINE
Payer: COMMERCIAL

## 2024-01-29 ENCOUNTER — TELEPHONE (OUTPATIENT)
Dept: GASTROENTEROLOGY | Facility: CLINIC | Age: 64
End: 2024-01-29
Payer: COMMERCIAL

## 2024-01-29 NOTE — TELEPHONE ENCOUNTER
"Endoscopy Scheduling Screen    Have you had a positive Covid test in the last 14 days?  No    Are you active on MyChart?   Yes    What insurance is in the chart?  Other:  [employer]    Ordering/Referring Provider: CARLYLE ZELAYA    (If ordering provider performs procedure, schedule with ordering provider unless otherwise instructed. )    BMI: Estimated body mass index is 24.12 kg/m  as calculated from the following:    Height as of 9/26/23: 1.778 m (5' 10\").    Weight as of 9/26/23: 76.2 kg (168 lb 1.6 oz).     Sedation Ordered  moderate sedation.   If patient BMI > 50 do not schedule in ASC.    If patient BMI > 45 do not schedule at [facility].    Are you taking methadone or Suboxone?  No    Have you had difficulties, pain, or discomfort during past endoscopy procedures?  No    Are you taking any prescription medications for pain 3 or more times per week?   NO - No RN review required.    Do you have a history of malignant hyperthermia or adverse reaction to anesthesia?  No    (Females) Are you currently pregnant?   No     Have you been diagnosed or told you have pulmonary hypertension?   No    Do you have an LVAD?  No    Have you been told you have moderate to severe sleep apnea?  No    Have you been told you have COPD, asthma, or any other lung disease?  No    Do you have any heart conditions?  Yes     In the past year, have you had any hospitalizations for heart related issues including cardiomyopathy, heart attack, or stent placement?  No    Do you have any implantable devices in your body (pacemaker, ICD)?  No    Do you take nitroglycerine?  No    Have you ever had an organ transplant?   No    Have you ever had or are you awaiting a heart or lung transplant?   No    Have you had a stroke or transient ischemic attack (TIA aka \"mini stroke\" in the last 6 months?   No    Have you been diagnosed with or been told you have cirrhosis of the liver?   No    Are you currently on dialysis?   No    Do you need assistance " "transferring?   No    BMI: Estimated body mass index is 24.12 kg/m  as calculated from the following:    Height as of 9/26/23: 1.778 m (5' 10\").    Weight as of 9/26/23: 76.2 kg (168 lb 1.6 oz).     Is patients BMI > 40 and scheduling location [facility]?  No    Do you take an injectable medication for weight loss or diabetes (excluding insulin)?  No    Do you take the medication Naltrexone?  No    Do you take blood thinners?  No       Prep   Are you currently on dialysis or do you have chronic kidney disease?  No    Do you have a diagnosis of diabetes?  Yes (Golytely Prep)    Do you have a diagnosis of cystic fibrosis (CF)?  No    On a regular basis do you go 3 -5 days between bowel movements?  No    BMI > 40?  No    Preferred Pharmacy:    [facility] [address] [place] [facility] [place], [place] - [address]  [address] [address]  Phone: [phone] Fax: [phone]      Final Scheduling Details   Colonoscopy prep sent?  Standard MiraLAX    Procedure scheduled  Colonoscopy    Surgeon:  joyce     Date of procedure:  3/13     Pre-OP / PAC:   No - Not required for this site.    Location  [facility] - Per order.    Sedation   Moderate Sedation - Per order.      Patient Reminders:   You will receive a call from a Nurse to review instructions and health history.  This assessment must be completed prior to your procedure.  Failure to complete the Nurse assessment may result in the procedure being cancelled.      On the day of your procedure, please designate an adult(s) who can drive you home stay with you for the next 24 hours. The medicines used in the exam will make you sleepy. You will not be able to drive.      You cannot take public transportation, ride share services, or non-medical taxi service without a responsible caregiver.  Medical transport services are allowed with the requirement that a responsible caregiver will receive you at your destination.  We require that drivers and caregivers are confirmed prior " to your procedure.

## 2024-02-01 ENCOUNTER — TELEPHONE (OUTPATIENT)
Dept: GASTROENTEROLOGY | Facility: CLINIC | Age: 64
End: 2024-02-01
Payer: COMMERCIAL

## 2024-02-01 NOTE — TELEPHONE ENCOUNTER
Caller: Theoni-wife  Reason for Reschedule/Cancellation (please be detailed, any staff messages or encounters to note?): Patient needs to talk with MD first, and will call us back to reschedule      Prior to reschedule please review:  Ordering Provider: I did not see any order for this procedure  Sedation Determined: CS  Does patient have any ASC Exclusions, please identify?: N      Notes on Cancelled Procedure:  Procedure: Upper Endoscopy [EGD]   Date: 3/13/24  Location: [facility]; [address] [place] [address], 3rd Floor, [place] [id number]  Surgeon: Dieudonne      Rescheduled: No    Send In - basket message to Panc - John Pool if [facility]  procedure is canceled or rescheduled: [ N/A, YES or NO] NA

## 2024-02-15 ENCOUNTER — TELEPHONE (OUTPATIENT)
Dept: EDUCATION SERVICES | Facility: CLINIC | Age: 64
End: 2024-02-15
Payer: COMMERCIAL

## 2024-02-15 ENCOUNTER — TELEPHONE (OUTPATIENT)
Dept: ENDOCRINOLOGY | Facility: CLINIC | Age: 64
End: 2024-02-15
Payer: COMMERCIAL

## 2024-02-15 DIAGNOSIS — E11.9 TYPE 2 DIABETES MELLITUS WITHOUT COMPLICATION, WITHOUT LONG-TERM CURRENT USE OF INSULIN (H): Primary | ICD-10-CM

## 2024-03-06 ENCOUNTER — OFFICE VISIT (OUTPATIENT)
Dept: EDUCATION SERVICES | Facility: CLINIC | Age: 64
End: 2024-03-06
Attending: INTERNAL MEDICINE
Payer: COMMERCIAL

## 2024-03-06 DIAGNOSIS — E11.9 TYPE 2 DIABETES MELLITUS WITHOUT COMPLICATION, WITHOUT LONG-TERM CURRENT USE OF INSULIN (H): ICD-10-CM

## 2024-03-06 PROCEDURE — G0108 DIAB MANAGE TRN  PER INDIV: HCPCS | Performed by: DIETITIAN, REGISTERED

## 2024-03-06 NOTE — PROGRESS NOTES
"Diabetes Self-Management Education & Support    Chema Mccullough is a 64 year old who presents today for education related to Type 2 Diabetes  Patient is being treated with:  diet, oral agents, exercise, and SMBG  He is accompanied by self and spouse, Theoni    Year of diagnosis: 2009  Referring provider:  Cheri Watson MD  Living Situation: with spouse  Employment: [employer]    PATIENT CONCERNS RELATED TO DIABETES SELF MANAGEMENT: is participating in study where he wears a Dexcom G7. Is learning a lot from sensor data      SUBJECTIVE / OBJECTIVE:  Diabetes type: Type 2  Disease course: Improving      Monitoring:  Times checking blood sugar at home (number): 4  Times checking blood sugar at home (per): Day  BG results:    CGM: Dexcom G7 (from study)  Was unable to link patient's account to Clarity today, will send him instructions on how to do this at home    Labs:  Lab Results   Component Value Date    A1C 7.6 09/26/2023    A1C 6.8 07/07/2020     Lab Results   Component Value Date     08/02/2023     03/08/2023     07/18/2022     07/07/2020     Lab Results   Component Value Date    LDL 67 11/30/2023    LDL 68 07/07/2020     HDL Cholesterol   Date Value Ref Range Status   07/07/2020 33 (L) >39 mg/dL Final     Direct Measure HDL   Date Value Ref Range Status   11/30/2023 40 >=40 mg/dL Final     GFR Estimate   Date Value Ref Range Status   08/02/2023 >90 >60 mL/min/1.73m2 Final   07/07/2020 >90 >60 mL/min/[1.73_m2] Final     Comment:     Non  GFR Calc  Starting 12/18/2018, serum creatinine based estimated GFR (eGFR) will be   calculated using the Chronic Kidney Disease Epidemiology Collaboration   (CKD-EPI) equation.       Lab Results   Component Value Date    CR 0.79 08/02/2023    CR 0.79 07/07/2020     No results found for: \"MICROALBUMIN\"    Diabetes Symptoms & Complications:  Diabetes Related Symptoms: None  Weight trend: Stable  Symptom course: Improving  Disease course: " Improving         Taking Medications:  Diabetes Medication(s)       Biguanides       metFORMIN (GLUCOPHAGE) 1000 MG tablet Take 1 tablet (1,000 mg) by mouth 2 times daily (with meals)       Sodium-Glucose Co-Transporter 2 (SGLT2) Inhibitors       empagliflozin (JARDIANCE) 25 MG TABS tablet Take 1 tablet (25 mg) by mouth daily          Current Treatments: Diet, Oral Medication (taken by mouth)    Problem Solving:  Patient carries a carbohydrate source: No  Hypoglycemia Symptoms  Hypoglycemia: None  Hypoglycemia Complications  Hypoglycemia Complications: None      Reducing Risks:  Discussed goals of diabetes care including A1C, blood pressure, cholesterol, kidney test (urine albumin), foot care, eye exam, exercise, aspirin, and diabetes self-management education     Patient's most recent   Lab Results   Component Value Date    A1C 7.6 09/26/2023    A1C 7.8 05/22/2023    A1C 6.8 07/07/2020    A1C 8.3 06/07/2013      Patient's A1C goal: <7.0    Being Active:   walks      Nutrition:     Meal planning/habits: Avoiding sweets, Low carb, Smaller portions  Beverages: Water, Coffee, Alcohol  Enjoys good bread, likes cereal  Spouse does cooking  Enjoys snacking on fruits        EDUCATION and INSTRUCTION PROVIDED AT THIS VISIT:    T2DM seen for Comprehensive Knowledge Assessment and Instruction at the request of Dr. Watson. Chema has had diabetes since 2009. Currently taking metformin and jardiance for diabetes management. He is participating in a study where he wears a Dexcom G7 for 6 months and has found it to be a great learning tool. He is doing well overall. Reviewed clarity reports on his phone, Time in Target Range is >80% per Dexcom Clarity myesha. He has lost weight gradually over the years which has helped. He is also trying to be more active. He started walking during the COVID-19 pandemic which helped with weight loss. He wants to continue enjoying foods such as good bread, wine, fruit. Discussed ways to manage CHO  "intake. He finds that certain foods like cereal spikes his glucose. Suggested choosing lower sugar and higher fiber options. Suggested adding a walk after meals to help lower post-prandial numbers. Discussed plate method and portion sizes. We also discussed relationship between carbohydrate and glucose, how to identify carbohydrate containing foods, how to use a nutrition facts label, where to find written and online/myesha based resources, how to estimate carbs and count carbs when eating out, and how to measure foods and estimate portions.   He is interested in wearing cgm after study ends. Discussed cash pay option for FreeStyle Amilcar 3 sensor. He is also interested in the Stelo - will be first cgm sensor available without prescription.    Patient-stated goal written and given to Chema Mccullough.  Verbalized and demonstrated understanding of instructions.     PLAN:  Continue wearing Dexcom G7 while participating in research today.  Use it as a learning tool  Check blood sugars at least 1 time(s) each day at varying times: before meals, after meals, and at bedtime.  Blood Glucose Targets:  Fasting and before meal: 80 - 130  2 hours after the start of a meal: less than 180    Continuous Glucose Monitor Goals:    Glucose Target Range  Recommended time in Target Range   70 to 180 70% or more of the time   Less than 70 Less than 4% of the time   Above 180 Less than 25% of time     Activity helps to improve blood sugars. Try to incorporate 150 minutes per week (at least 10 minute at a time, and at least every other day)    Eat three balanced meals each day, consider using plate planning method, aiming for a variety of food groups including 1/4 plate starch/grains, 1/4 plate lean protein, and 1/2 plate non-starchy vegetables. Focus on \"high quality\" carbohydrates (whole grains, starchy vegetables, fruits, beans/legumes). Limit added sugar as much as possible.    Take diabetes medications as prescribed. Metformin, and " Jardiance    If you are overweight aim for a 5% weight loss. This will improve blood sugars, cholesterol, and blood pressure!     See patient instructions.  AVS provided to patient.    FOLLOW-UP:    With Diabetes Education as needed    Time spent with patient at today's visit was 60 minutes.      Any diabetes medication initiation or dose changes were made via the CDCES Standing Orders per the patient's referring provider. A copy of this encounter was shared with the provider.

## 2024-03-06 NOTE — PATIENT INSTRUCTIONS
"Ramiro Bear,    It was a pleasure meeting with you and your wife today!    To Share your data with our clinic:    To start sharing data with this clinic, enter the clinic code at [email or web address]    Clinic code: [email or web address]    Amilcar 3 sensor: cash pay is $75 for two, each sensor lasts for 14 days    Higher protein, lower sugar milk - Viigo milk    Melecio seed jam - https://www.mobiManage/how-to-make-easy-melecio-jam-with-any-fruit-80931    Goals for Diabetes:    Check blood sugars at least 1 time(s) each day at varying times: before meals, after meals, and at bedtime.    Blood Glucose Targets:   Fasting and before meal: 80 - 130   2 hours after the start of a meal: less than 180    Continuous Glucose Monitor Goals:    Glucose Target Range  Recommended time in Target Range   70 to 180 70% or more of the time   Less than 70 Less than 4% of the time   Above 180 Less than 25% of time       Activity helps to improve blood sugars. Try to incorporate 150 minutes per week (at least 10 minute at a time, and at least every other day)    Eat three balanced meals each day, consider using plate planning method, aiming for a variety of food groups including 1/4 plate starch/grains, 1/4 plate lean protein, and 1/2 plate non-starchy vegetables. Focus on \"high quality\" carbohydrates (whole grains, starchy vegetables, fruits, beans/legumes). Limit added sugar as much as possible.    Take diabetes medications as prescribed. Metformin, and Jardiance    If you are overweight aim for a 5% weight loss. This will improve blood sugars, cholesterol, and blood pressure!     Follow up:  As needed  Phone number: [phone]    Call or Mychart with any questions.    Ayde COLON  Diabetes Educator  [address] [address]  Phone: [phone]  Email: [email or web address]       "

## 2024-03-11 ENCOUNTER — MYC MEDICAL ADVICE (OUTPATIENT)
Dept: ENDOCRINOLOGY | Facility: CLINIC | Age: 64
End: 2024-03-11
Payer: COMMERCIAL

## 2024-03-13 ENCOUNTER — TELEPHONE (OUTPATIENT)
Dept: EDUCATION SERVICES | Facility: CLINIC | Age: 64
End: 2024-03-13
Payer: COMMERCIAL

## 2024-03-13 NOTE — TELEPHONE ENCOUNTER
Diabetes Educator Note:     Called Chema to help troubleshoot his Dexcom.   He was previously at the [facility], and set up his original Dexcom account while he was there.    He thought he had linked appropriately to our clinic, however his account is under a number, not a name.   He is trying to change his name in the phone myesha, but it appears this has not been successful.      To access his Dexcom data, one must search for the following name:  [id number].  The birthdate for this account is his correct birthday:  1960.     Note in the Snapshot section of the EMR.     IRVIN FatimaN, RN, [facility]  Certified Diabetes Care and   [facility] Endocrinology and Diabetes  [facility]  Phone [phone]  Hours:  Tuesday:       In Clinic and Virtual Visits  8am to 4pm              Wednesday:  In Clinic and Virtual Visits  8am to 4pm               Thursday:     Virtural  Visits only             8am to 4pm

## 2024-03-13 NOTE — TELEPHONE ENCOUNTER
Appears message addressed by Lacy Anderson. Please refer to [facility] Medical Advice Encounter for details.    Paige Castaneda RN, [facility]  Diabetes Education Department  [facility], [place]  Phone: [phone]  [phone]

## 2024-03-13 NOTE — TELEPHONE ENCOUNTER
[facility] Call Center    Phone Message    May a detailed message be left on voicemail: yes     Reason for Call: Trying to get connected with the CGM registration, connection says it is connected, he is being told they are not receiving the information, please call.      Action Taken: Message routed to:  [facility] ([facility]): Endo    Travel Screening: Not Applicable

## 2024-03-19 ENCOUNTER — TELEPHONE (OUTPATIENT)
Dept: ENDOCRINOLOGY | Facility: CLINIC | Age: 64
End: 2024-03-19
Payer: COMMERCIAL

## 2024-03-19 NOTE — TELEPHONE ENCOUNTER
CGM reviewed, average glucose 157, 81% within range, Pt to eat less carbs with breakfast.  Called pt - left message.

## 2024-04-13 ENCOUNTER — HEALTH MAINTENANCE LETTER (OUTPATIENT)
Age: 64
End: 2024-04-13

## 2024-06-22 ENCOUNTER — HEALTH MAINTENANCE LETTER (OUTPATIENT)
Age: 64
End: 2024-06-22

## 2024-07-21 ENCOUNTER — TELEPHONE (OUTPATIENT)
Dept: ENDOCRINOLOGY | Facility: CLINIC | Age: 64
End: 2024-07-21
Payer: COMMERCIAL

## 2024-07-21 DIAGNOSIS — E78.5 HYPERLIPIDEMIA LDL GOAL <70: Primary | ICD-10-CM

## 2024-07-22 ENCOUNTER — TRANSFERRED RECORDS (OUTPATIENT)
Dept: HEALTH INFORMATION MANAGEMENT | Facility: CLINIC | Age: 64
End: 2024-07-22

## 2024-07-22 NOTE — TELEPHONE ENCOUNTER
Pt has severe coronary calcium noted on CT - referral made to cardiology, on statin, LDL at 70        -

## 2024-07-26 ENCOUNTER — OFFICE VISIT (OUTPATIENT)
Dept: NEUROLOGY | Facility: CLINIC | Age: 64
End: 2024-07-26
Payer: COMMERCIAL

## 2024-07-26 VITALS
WEIGHT: 164 LBS | DIASTOLIC BLOOD PRESSURE: 73 MMHG | BODY MASS INDEX: 23.53 KG/M2 | SYSTOLIC BLOOD PRESSURE: 115 MMHG | HEART RATE: 89 BPM | OXYGEN SATURATION: 96 %

## 2024-07-26 DIAGNOSIS — R47.89 WORD FINDING DIFFICULTY: Primary | ICD-10-CM

## 2024-07-26 PROCEDURE — 99214 OFFICE O/P EST MOD 30 MIN: CPT | Performed by: PSYCHIATRY & NEUROLOGY

## 2024-07-26 ASSESSMENT — PAIN SCALES - GENERAL: PAINLEVEL: NO PAIN (0)

## 2024-07-26 NOTE — PATIENT INSTRUCTIONS
Your cognitive screen again is relatively normal. Our screening is limited, and I am not really that concerned about your level of function.     You could repeat your cognitive screen with neuropsychology in the future, but if it is stable I would ask that you return to clinic if new deficits present themselves.     Otherwise, focusing on anxiety about difficulties of aging is a good idea for the next year.

## 2024-07-26 NOTE — LETTER
7/26/2024       RE: Chema Mccullough  [address] [place] [address]     Dear Colleague,    Thank you for referring your patient, Chema Mccullough, to the [facility] NEUROLOGY [facility] [place] [facility]. Please see a copy of my visit note below.    [facility] Neurology Follow Up Visit    Chema Mccullough MRN# [id number]   Age: 64 year old YOB: 1960     Brief history of symptoms: The patient was initially seen in neurologic consultation on 8/2/2023 for evaluation of word finding worries. Please see the comprehensive neurologic consultation notes from those dates in the Epic records for details.     Please see prior notes in regards to previous w/up for cognitive issues through [facility].  As per our prior visit, he had a great MiniCOG score, normal exam, and prior testing (MRI brain, FDG-PET, neuropsychology) it was thought he may benefit from working with a therapist for his cognitive issues/concerns. He was to follow up should new concerns arise.    Today, the patient feels his iron and ferritin issues are resolved.  He feels a bit better since this has been correction. He also feels his metabolism and overall body function feels better (urination, constipation not present).  He feels that in a staircase he is walking more confidently now than in the past.  He has been taking some differing teas and melatonin as needed at bedtime which has helped his sleep (now has more 7-8 hours of continued sleep).     He still worries when he forgets something (cell phone, not closing the garage door).  He is understanding that he did this stuff in the past but it just bothers him now more-so than usual.  He feels he is doing better in terms of handling a lower work load after living a life that was full schedule and intense in his work.  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       Physical Exam:   General: Seated comfortably in no acute distress. Jovial in nature, accurate historian.  Neurologic:     Mental Status: Fully alert, attentive and oriented. Speech clear and fluent, no paraphasic errors.  MoCA: 29/30  - Visualspatial/executive: 5/5  - Naming: 3/3  - Recall: 5/5  - Attention: 6/6  - Language: 2/3 (drops \"always\" from a sentence)  - Abstraction: 2/2  - Orientation: 6/6       Cranial Nerves: EOMI with normal smooth pursuit. Facial movements symmetric. Hearing not formally tested but intact to conversation.  No dysarthria.     Motor: No tremors or other abnormal movements observed.      Gait: Normal, steady casual gait.         Assessment and Plan:   Assessment:  Subjective cognitive difficulties    The patient's MoCA is relatively similar to that done a year ago, and overall there is no true indication of cognitive decline or deficits in language.  I suspect that anxiety about changes of aging are present in this highly intelligent individual who has a great deal of responsibility on his shoulders from his profession. He may notice mild issues and make them larger than what they truly are.  He understands his personality and that his prior extensive testing was relatively benign in result.  I do think he may not easily find deficits in early stages of diseases that impact cognition given his high level of intelligence, so I asked him to keep track of major issues he sees as well as ask those around him who he trust to make sure they let him know if they see worrisome changes.  Otherwise, we focused on reassurance of his perceived deficits being mostly related to normal aging and I think that he could repeat his neuropsychology testing in roughly a year and a half.      Plan:  - Neuropsychology in 10-12 months    Follow up in Neurology clinic in one year, or earlier as needed should new " concerns arise.    Total time today (30 min) in this patient encounter was spent on pre-charting, counseling and/or coordination of care.         Again, thank you for allowing me to participate in the care of your patient.      Sincerely,    Gallo Clayton, DO

## 2024-07-26 NOTE — PROGRESS NOTES
[facility] Neurology Follow Up Visit    Chema Mccullough MRN# [id number]   Age: 64 year old YOB: 1960     Brief history of symptoms: The patient was initially seen in neurologic consultation on 8/2/2023 for evaluation of word finding worries. Please see the comprehensive neurologic consultation notes from those dates in the Epic records for details.     Please see prior notes in regards to previous w/up for cognitive issues through [facility].  As per our prior visit, he had a great MiniCOG score, normal exam, and prior testing (MRI brain, FDG-PET, neuropsychology) it was thought he may benefit from working with a therapist for his cognitive issues/concerns. He was to follow up should new concerns arise.    Today, the patient feels his iron and ferritin issues are resolved.  He feels a bit better since this has been correction. He also feels his metabolism and overall body function feels better (urination, constipation not present).  He feels that in a staircase he is walking more confidently now than in the past.  He has been taking some differing teas and melatonin as needed at bedtime which has helped his sleep (now has more 7-8 hours of continued sleep).     He still worries when he forgets something (cell phone, not closing the garage door).  He is understanding that he did this stuff in the past but it just bothers him now more-so than usual.  He feels he is doing better in terms of handling a lower work load after living a life that was full schedule and intense in his work.  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       Physical Exam:   General: Seated comfortably in no acute distress. Jovial in nature, accurate historian.  Neurologic:     Mental Status: Fully alert, attentive and oriented. Speech clear and fluent, no paraphasic errors.  MoCA:  "29/30  - Visualspatial/executive: 5/5  - Naming: 3/3  - Recall: 5/5  - Attention: 6/6  - Language: 2/3 (drops \"always\" from a sentence)  - Abstraction: 2/2  - Orientation: 6/6       Cranial Nerves: EOMI with normal smooth pursuit. Facial movements symmetric. Hearing not formally tested but intact to conversation.  No dysarthria.     Motor: No tremors or other abnormal movements observed.      Gait: Normal, steady casual gait.         Assessment and Plan:   Assessment:  Subjective cognitive difficulties    The patient's MoCA is relatively similar to that done a year ago, and overall there is no true indication of cognitive decline or deficits in language.  I suspect that anxiety about changes of aging are present in this highly intelligent individual who has a great deal of responsibility on his shoulders from his profession. He may notice mild issues and make them larger than what they truly are.  He understands his personality and that his prior extensive testing was relatively benign in result.  I do think he may not easily find deficits in early stages of diseases that impact cognition given his high level of intelligence, so I asked him to keep track of major issues he sees as well as ask those around him who he trust to make sure they let him know if they see worrisome changes.  Otherwise, we focused on reassurance of his perceived deficits being mostly related to normal aging and I think that he could repeat his neuropsychology testing in roughly a year and a half.      Plan:  - Neuropsychology in 10-12 months    Follow up in Neurology clinic in one year, or earlier as needed should new concerns arise.    BIMAL Clayton D.O.   of Neurology    Total time today (30 min) in this patient encounter was spent on pre-charting, counseling and/or coordination of care.     "

## 2024-07-29 ENCOUNTER — TELEPHONE (OUTPATIENT)
Dept: NEUROPSYCHOLOGY | Facility: CLINIC | Age: 64
End: 2024-07-29
Payer: COMMERCIAL

## 2024-07-29 NOTE — TELEPHONE ENCOUNTER
"Left Voicemail (1st Attempt) and Sent Mychart (1st Attempt) for the patient to call back and schedule the following:    Appointment type: Neuropsych Eval  Provider: ANY, ANY LOCATION  Additonal Notes: Please schedule at least 10 months out, per triage message on 7/29    \"Timeline: NEXT 10-12 MONTHS  Location: ANY LOCATION  Preferred Provider: ANY PROVIDER  If Preferred Provider is not available: ANY PROVIDER  Visit Type: NEUROPSYCH EVAL or Neuropsych Interview Only (testing scheduled shortly after interview)  Special Instructions:     Please schedule at least 10 months out.      Jane Reyes   Psychometrist \"    Cassandra Desouza on 7/29/2024 at 5:14 PM      "

## 2024-07-29 NOTE — CONFIDENTIAL NOTE
Timeline: NEXT 10-12 MONTHS  Location: ANY LOCATION  Preferred Provider: ANY PROVIDER  If Preferred Provider is not available: ANY PROVIDER  Visit Type: NEUROPSYCH EVAL or Neuropsych Interview Only (testing scheduled shortly after interview)  Special Instructions:    Please schedule at least 10 months out.     Jane Reyes   Psychometrist

## 2024-07-29 NOTE — TELEPHONE ENCOUNTER
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

## 2024-08-20 ENCOUNTER — TELEPHONE (OUTPATIENT)
Dept: CARDIOLOGY | Facility: CLINIC | Age: 64
End: 2024-08-20
Payer: COMMERCIAL

## 2024-08-20 NOTE — TELEPHONE ENCOUNTER
8/20 lvm for pt to be seen sooner w/ Dontae Boyd for New Lipid Management with labs prior (non fasting)  on 8/23 slots been held

## 2024-09-12 NOTE — TELEPHONE ENCOUNTER
RECORDS RECEIVED FROM:    DATE RECEIVED:    NOTES STATUS DETAILS   OFFICE NOTE from referring provider  Internal Dr. Walter valentin 7-21-24   OFFICE NOTE from other cardiologists  Care Everywhere 7-22-24 PN Dr. Carrillo   RECORDS from hospital/ED N/A    MEDICATION LIST Internal    GENERAL CARDIO RECORDS   (ALL APPOINTMENT TYPES)     EKG (STRIPS & REPORTS) In process    MONITORS (STRIPS & REPORTS) N/A    ECHOS (IMAGES AND REPORTS) N/A    cMRI (IMAGES AND REPORTS) N/A    Cardiac cath (IMAGES AND REPORTS) N/A    CT/CTA (IMAGES AND REPORTS) In process    NEW LIPID MANAGMENT     LIPID LABS (LAST 5 YEARS) Care Everywhere    STRESS TESTS (IMAGES AND REPORTS) N/A      Action 9/12/24 [facility]  Fax #[phone]   Action Taken Requested EKG 7-22-24    Sent EKG to scanning 9/13       Action 9/12/24 [facility]  Fax #[phone]   Action Taken Requested  CT coronary 7-17-24    Resolved image in PACS 9/13

## 2024-09-19 ENCOUNTER — PRE VISIT (OUTPATIENT)
Dept: CARDIOLOGY | Facility: CLINIC | Age: 64
End: 2024-09-19
Payer: COMMERCIAL

## 2024-10-15 ENCOUNTER — TELEPHONE (OUTPATIENT)
Dept: CARDIOLOGY | Facility: CLINIC | Age: 64
End: 2024-10-15
Payer: COMMERCIAL

## 2024-10-15 NOTE — TELEPHONE ENCOUNTER
10/15 Patient confirmed scheduled appointment:  Date: 10/28/2024  Time: 2:30 pm  Visit type: New Lipid Management  Provider: Jhon  Location: [facility]  Testing/imaging: n/a  Additional notes: n/a

## 2024-10-28 ENCOUNTER — OFFICE VISIT (OUTPATIENT)
Dept: CARDIOLOGY | Facility: CLINIC | Age: 64
End: 2024-10-28
Attending: INTERNAL MEDICINE
Payer: COMMERCIAL

## 2024-10-28 ENCOUNTER — LAB (OUTPATIENT)
Dept: LAB | Facility: CLINIC | Age: 64
End: 2024-10-28
Attending: INTERNAL MEDICINE
Payer: COMMERCIAL

## 2024-10-28 VITALS
SYSTOLIC BLOOD PRESSURE: 107 MMHG | BODY MASS INDEX: 22.97 KG/M2 | OXYGEN SATURATION: 96 % | HEART RATE: 96 BPM | WEIGHT: 160.1 LBS | DIASTOLIC BLOOD PRESSURE: 71 MMHG

## 2024-10-28 DIAGNOSIS — E78.5 HYPERLIPIDEMIA LDL GOAL <70: ICD-10-CM

## 2024-10-28 DIAGNOSIS — E78.5 HYPERLIPIDEMIA LDL GOAL <100: Primary | ICD-10-CM

## 2024-10-28 DIAGNOSIS — I10 ESSENTIAL HYPERTENSION: ICD-10-CM

## 2024-10-28 LAB — APO A-I SERPL-MCNC: 50 MG/DL

## 2024-10-28 PROCEDURE — 99000 SPECIMEN HANDLING OFFICE-LAB: CPT | Performed by: PATHOLOGY

## 2024-10-28 PROCEDURE — 99204 OFFICE O/P NEW MOD 45 MIN: CPT | Performed by: INTERNAL MEDICINE

## 2024-10-28 PROCEDURE — 83695 ASSAY OF LIPOPROTEIN(A): CPT | Performed by: INTERNAL MEDICINE

## 2024-10-28 PROCEDURE — 36415 COLL VENOUS BLD VENIPUNCTURE: CPT | Performed by: PATHOLOGY

## 2024-10-28 PROCEDURE — 93005 ELECTROCARDIOGRAM TRACING: CPT

## 2024-10-28 PROCEDURE — 99213 OFFICE O/P EST LOW 20 MIN: CPT | Performed by: INTERNAL MEDICINE

## 2024-10-28 RX ORDER — DONEPEZIL HYDROCHLORIDE 10 MG/1
TABLET, FILM COATED ORAL
COMMUNITY
Start: 2023-04-14

## 2024-10-28 RX ORDER — AMOXICILLIN 250 MG
1 CAPSULE ORAL
COMMUNITY
Start: 2024-10-09

## 2024-10-28 RX ORDER — PANTOPRAZOLE SODIUM 40 MG/1
40 TABLET, DELAYED RELEASE ORAL
COMMUNITY
Start: 2024-10-10

## 2024-10-28 RX ORDER — MAGNESIUM OXIDE 400 MG/1
400 TABLET ORAL
COMMUNITY

## 2024-10-28 RX ORDER — ACETAMINOPHEN 500 MG
1000 TABLET ORAL
COMMUNITY
Start: 2024-10-09

## 2024-10-28 RX ORDER — PSYLLIUM HUSK 0.4 G
1 CAPSULE ORAL DAILY
COMMUNITY

## 2024-10-28 ASSESSMENT — PAIN SCALES - GENERAL: PAINLEVEL_OUTOF10: NO PAIN (0)

## 2024-10-28 NOTE — PROGRESS NOTES
"HPI:     I had the privilege to evaluate and examine Mr. Mccullough is a 63yo man with PMHx of T2DM, HLD, CAC, hx of GIB, and recent left hip fracture presenting to lipid clinic for his CAC.    Pt previously saw Dr. Alexandro Carrillo. His CAC on 7/17/24 was 1809 (60 in LM, 311 LAD, 129 Lcx, 1229 RCA and 80 RPDA). Prior CAC in 12/2011 was 721. Pt has been on statins for \"years\" and has been on rosuvastatin 20 mg at bedtime for the past year with a most recentl LDL of 49 and Tchol 113.    He previously took a baby ASA for over a decade but then had to stop due to DANIEL and occult GIB. He has had no issues with this since.    He had a mechanical fall exiting his rowing machine and fractured his left hip, now briefly on dose reduced Eliquis for post-op DVT ppx.     Pt is a former smoker for 25 years 1 ppd, quit in 2012. Social EtOH use. No drug use. Pt is generally physically active, walks a lot on a treadmill, elliptical bike, and rowing machine. He eats a healthy mediterranean diet. Pt is a . He denies any anginal symptoms, palpitations, or SOB.     He takes metformin and jardiance for his T2DM, reportedly last A1c this year was 6.7. Family hx notable for his father dying from an MI at age 54.     PAST MEDICAL HISTORY:  Past Medical History:   Diagnosis Date    Diabetes (H)        CURRENT MEDICATIONS:  Current Outpatient Medications   Medication Sig Dispense Refill    empagliflozin (JARDIANCE) 25 MG TABS tablet Take 1 tablet (25 mg) by mouth daily 90 tablet 4    fish oil-omega-3 fatty acids 500 MG capsule Take 500 mg by mouth daily      melatonin 1 MG TABS tablet Take 1 mg by mouth At Bedtime      metFORMIN (GLUCOPHAGE) 1000 MG tablet Take 1 tablet (1,000 mg) by mouth 2 times daily (with meals) 180 tablet 3    rosuvastatin (CRESTOR) 10 MG tablet Take 2 tablets (20 mg) by mouth daily 180 tablet 3    UNABLE TO FIND Take 22 mg by mouth daily Raw Iron, with B12, vitamin C and folate (Patient not taking: " Reported on 2023)         PAST SURGICAL HISTORY:  Past Surgical History:   Procedure Laterality Date    CAPSULE/PILL CAM ENDOSCOPY N/A 5/15/2023    Procedure: Capsule/pill cam endoscopy;  Surgeon: Chris Underwood MD;  Location: [facility]    COLONOSCOPY N/A 3/8/2023    Procedure: Colonoscopy with APC;  Surgeon: Chris Bro MD;  Location: [facility]    ESOPHAGOSCOPY, GASTROSCOPY, DUODENOSCOPY (EGD), COMBINED N/A 3/8/2023    Procedure: Esophagoscopy, gastroscopy, duodenoscopy (EGD), combined;  Surgeon: Chris Bro MD;  Location: [facility]    ESOPHAGOSCOPY, GASTROSCOPY, DUODENOSCOPY (EGD), COMBINED N/A 5/3/2023    Procedure: Esophagoscopy, gastroscopy, duodenoscopy (EGD), combined;  Surgeon: Chris Bro MD;  Location: [facility]       ALLERGIES   No Known Allergies    FAMILY HISTORY:  Family History   Problem Relation Age of Onset    Diabetes Mother     Diabetes Father     Cancer No family hx of         no skin cancer    Glaucoma No family hx of     Macular Degeneration No family hx of        SOCIAL HISTORY:  Social History     Socioeconomic History    Marital status:    Tobacco Use    Smoking status: Former     Current packs/day: 0.00     Average packs/day: 1 pack/day for 32.2 years (32.2 ttl pk-yrs)     Types: Cigarettes, Cigars     Start date: 10/10/1978     Quit date: 2011     Years since quittin.8    Smokeless tobacco: Former   Substance and Sexual Activity    Alcohol use: No    Drug use: No    Sexual activity: Yes     Partners: Female     Social Drivers of Health     Financial Resource Strain: Not At Risk (2024)    Received from [facility], [facility]    Financial Resource Strain     Is it hard for you to pay for the very basics like food, housing, medical care or heating?: No   Food Insecurity: No Food Insecurity (10/7/2024)    Received from [facility] Vital Sign     Worried About Running Out of Food in the Last Year: Never true     Ran Out of Food  in the Last Year: Never true   Transportation Needs: No Transportation Needs (10/7/2024)    Received from [facility]     PRAPARE - Transportation     Lack of Transportation (Medical): No     Lack of Transportation (Non-Medical): No   Physical Activity: Sufficiently Active (4/11/2023)    [facility], [facility]    Exercise Vital Sign     Days of Exercise per Week: 4 days     Minutes of Exercise per Session: 40 min   Stress: Stress Concern Present (4/11/2023)    Received from [facility]    [employer] - Occupational Stress Questionnaire     Feeling of Stress : Rather much    [facility] from [facility], [facility]    Social Connections   Interpersonal Safety: Not At Risk (10/6/2024)    Received from [facility]     Humiliation, Afraid, Rape, and Kick questionnaire     Fear of Current or Ex-Partner: No     Emotionally Abused: No     Physically Abused: No     Sexually Abused: No   Housing Stability: Low Risk  (10/7/2024)    Received from [facility]     Housing Stability Vital Sign     Unable to Pay for Housing in the Last Year: No     Number of Times Moved in the Last Year: 0     Homeless in the Last Year: No       ROS:   Constitutional: No fever, chills, or sweats. No weight gain/loss   ENT: No visual disturbance, ear ache, epistaxis, sore throat  Allergies/Immunologic: Negative.   Respiratory: No cough, hemoptysia  Cardiovascular: As per HPI  GI: No nausea, vomiting, hematemesis, melena, or hematochezia  : No urinary frequency, dysuria, or hematuria  Integument: Negative  Psychiatric: Negative  Neuro: Negative  Endocrinology: Negative   Musculoskeletal: Negative    EXAM:  /71 (BP Location: Left arm, Patient Position: Chair, Cuff Size: Adult Regular)   Pulse 96   Wt 72.6 kg (160 lb 1.6 oz)   SpO2 96%   BMI 22.97 kg/m    In general, the patient is a  pleasant male in no apparent distress.    HEENT: NC/AT.  PERRLA.  EOMI.  Sclerae white, not injected.  Nares clear.  Pharynx without erythema or exudate.  Dentition intact.    Neck: No adenopathy.  No thyromegaly. Carotids +4/4 bilaterally without bruits.  No jugular venous distension.   Heart: RRR. Normal S1, S2 splits physiologically. No murmur, rub, click, or gallop. The PMI is in the 5th ICS in the midclavicular line. There is no heave.    Lungs: CTA.  No ronchi, wheezes, rales.  No dullness to percussion.   Abdomen: Soft, nontender, nondistended. No organomegaly.  No bruits.   Extremities: No clubbing, cyanosis, or edema.  The pulses are +4/4 at the radial, brachial, femoral, popliteal, DP, and PT sites bilaterally.  No bruits are noted.  Neurologic: Alert and oriented to person/place/time, normal speech, gait and affect  Skin: No petechiae, purpura or rash.    Labs:  LIPID RESULTS:  Lab Results   Component Value Date    CHOL 137 11/30/2023    CHOL 130 07/07/2020    HDL 40 11/30/2023    HDL 33 (L) 07/07/2020    LDL 67 11/30/2023    LDL 68 07/07/2020    TRIG 151 (H) 11/30/2023    TRIG 144 07/07/2020    CHOLHDLRATIO 3.8 11/12/2015    NHDL 97 11/30/2023    NHDL 97 07/07/2020         CBC RESULTS:  Lab Results   Component Value Date    WBC 4.7 11/30/2023    WBC 6.8 11/18/2016    RBC 5.00 11/30/2023    RBC 4.76 11/18/2016    HGB 15.2 11/30/2023    HGB 14.4 11/18/2016    HCT 44.4 11/30/2023    HCT 41.2 11/18/2016    MCV 89 11/30/2023    MCV 87 11/18/2016    MCH 30.4 11/30/2023    MCH 30.3 11/18/2016    MCHC 34.2 11/30/2023    MCHC 35.0 11/18/2016    RDW 11.5 11/30/2023    RDW 11.8 11/18/2016     11/30/2023     11/18/2016       BMP RESULTS:  Lab Results   Component Value Date     08/02/2023     07/07/2020    POTASSIUM 4.4 08/02/2023    POTASSIUM 4.0 07/18/2022    POTASSIUM 4.4 07/07/2020    CHLORIDE 103 08/02/2023    CHLORIDE 108 07/18/2022    CHLORIDE 106 07/07/2020    CO2 25 08/02/2023     CO2 26 07/18/2022    CO2 29 07/07/2020    ANIONGAP 10 08/02/2023    ANIONGAP <1 (L) 07/18/2022    ANIONGAP 5 07/07/2020     (H) 08/02/2023     (H) 03/08/2023     (H) 07/18/2022     (H) 07/07/2020    BUN 13.4 08/02/2023    BUN 12 07/18/2022    BUN 15 07/07/2020    CR 0.79 08/02/2023    CR 0.79 07/07/2020    GFRESTIMATED >90 08/02/2023    GFRESTIMATED >90 07/07/2020    GFRESTBLACK >90 07/07/2020    MOUNIKA 10.2 08/02/2023    MOUNIKA 9.6 07/07/2020        A1C RESULTS:  Lab Results   Component Value Date    A1C 7.6 (H) 09/26/2023    A1C 6.8 (H) 07/07/2020     Lpa 50 mg/dl (normal < 30 mg/dl)    Procedures:    EKG: sin rhythm, RBBB    Coronary CT CAC:   Incidental Findings: 5 mm peripheral sub-solid left lower lobe pulmonary nodule on image #22 series #5. Recommended dedicated noncontrast CT of the chest in 3 months     Left main:  60   Left anterior descending artery:  311   Circumflex artery:  129   Right coronary artery:  1229   Posterior descending artery:  80   Other:  0     TOTAL CALCIUM SCORE:  1809     CALCIUM PERCENTILE SCORE:  A total calcium score of 1809 is between the 90 percentile and the 100 percentile for males between the ages of 65/69.     A score of 401 or higher is compatible with extensive atherosclerotic plaque and a high likelihood of at least one significant coronary artery narrowing.       Assessment and Plan:     We discussed the results with patient.  We discussed following items:    Medication Changes: None     Follow Up:   -Labs when able  -Ultrasounds when able - US Doppler/[place] and segmental at both lowerextremities  -Follow up based on results     Follow the American Heart Association Diet and Lifestyle Recommendations:  -Limit saturated fat, trans fat, sodium, red meat, sweets and sugar-sweetened beverages. If you choose to eat red meat, compare labels and select the leanest cuts available.      Alexandro Ferreira MD, PhD  Professor of Medicine  Division of Cardiology      CC  Patient Care Team:  Jocelin Hu MD as PCP - General  Cheri Couch MD as MD (INTERNAL MEDICINE - ENDOCRINOLOGY, DIABETES & METABOLISM)  Gilles Sheikh MD as MD (Ophthalmology)  Cassandra Garcia MD as MD (Dermatology)  Cheri Couch MD as Assigned Endocrinology Provider  Valente Tapia Chi, OD as MD (Optometry)  Valente Tapia Chi, OD as MD (Optometry)  Gallo Clayton DO as MD (Neurology)  Alexey Romano MD as MD (Gastroenterology)  Chris Bro MD as MD (Gastroenterology)  Marcin Justice MD as Assigned Surgical Provider  Jovanna Singleton MD as MD (Gastroenterology)  Gallo Clayton DO as Assigned Neuroscience Provider  Lavelle Rolle LMFT as Therapist (Marriage & Family Therapist)  Lavelle Rolle LMFT as Assigned Behavioral Health Provider  Ayde Lima RD, LD as Registered Dietitian  Yenny, Katalina Ordonez PA-C as Physician Assistant (Dermatology)  Alexandro Ferreira MD as MD (Cardiovascular Disease)  Aneta Bill, PhD as Psychologist (Neuropsychology)  CHERI COUCH

## 2024-10-28 NOTE — PATIENT INSTRUCTIONS
October 28, 2024    Cardiology Provider You Saw During Your Visit: Dr. Ferreira      Medication Changes: None      Follow Up:   -Labs when able  -Ultrasounds when able  -Follow up based on results      Follow the American Heart Association Diet and Lifestyle Recommendations:  -Limit saturated fat, trans fat, sodium, red meat, sweets and sugar-sweetened beverages. If you choose to eat red meat, compare labels and select the leanest cuts available.  -Aim for at least 150 minutes of moderate physical activity or 75 minutes of vigorous physical activity - or an equal combination of both - each week.      To Reach Us:  -During business hours: [phone], press option # 1 to schedule an appointment or to leave a message for your care team.     -After hours, weekends or holidays: [phone], press option #4 and ask to speak to the on-call cardiologist. Inform them you are a patient at the [facility].        **If you have a cardiac device, please make sure you schedule an in-person device check just prior to your cardiology provider appointments**        Charleen Mary RN  Cardiology Care Coordinator - General Cardiology  [facility]

## 2024-10-28 NOTE — LETTER
"10/28/2024      RE: Chema Mccullough  [address] [place] [address]       Dear Colleague,    Thank you for the opportunity to participate in the care of your patient, Chema Mccullough, at the [facility]. Please see a copy of my visit note below.    HPI:     I had the privilege to evaluate and examine Mr. Mccullough is a 63yo man with PMHx of T2DM, HLD, CAC, hx of GIB, and recent left hip fracture presenting to lipid clinic for his CAC.    Pt previously saw Dr. Alexandro Carrillo. His CAC on 7/17/24 was 1809 (60 in LM, 311 LAD, 129 Lcx, 1229 RCA and 80 RPDA). Prior CAC in 12/2011 was 721. Pt has been on statins for \"years\" and has been on rosuvastatin 20 mg at bedtime for the past year with a most recentl LDL of 49 and Tchol 113.    He previously took a baby ASA for over a decade but then had to stop due to DANIEL and occult GIB. He has had no issues with this since.    He had a mechanical fall exiting his rowing machine and fractured his left hip, now briefly on dose reduced Eliquis for post-op DVT ppx.     Pt is a former smoker for 25 years 1 ppd, quit in 2012. Social EtOH use. No drug use. Pt is generally physically active, walks a lot on a treadmill, elliptical bike, and rowing machine. He eats a healthy mediterranean diet. Pt is a . He denies any anginal symptoms, palpitations, or SOB.     He takes metformin and jardiance for his T2DM, reportedly last A1c this year was 6.7. Family hx notable for his father dying from an MI at age 54.     PAST MEDICAL HISTORY:  Past Medical History:   Diagnosis Date     Diabetes (H)        CURRENT MEDICATIONS:  Current Outpatient Medications   Medication Sig Dispense Refill     empagliflozin (JARDIANCE) 25 MG TABS tablet Take 1 tablet (25 mg) by mouth daily 90 tablet 4     fish oil-omega-3 fatty acids 500 MG capsule Take 500 mg by mouth daily       " melatonin 1 MG TABS tablet Take 1 mg by mouth At Bedtime       metFORMIN (GLUCOPHAGE) 1000 MG tablet Take 1 tablet (1,000 mg) by mouth 2 times daily (with meals) 180 tablet 3     rosuvastatin (CRESTOR) 10 MG tablet Take 2 tablets (20 mg) by mouth daily 180 tablet 3     UNABLE TO FIND Take 22 mg by mouth daily Raw Iron, with B12, vitamin C and folate (Patient not taking: Reported on 2023)         PAST SURGICAL HISTORY:  Past Surgical History:   Procedure Laterality Date     CAPSULE/PILL CAM ENDOSCOPY N/A 5/15/2023    Procedure: Capsule/pill cam endoscopy;  Surgeon: Chris Underwood MD;  Location: [facility]     COLONOSCOPY N/A 3/8/2023    Procedure: Colonoscopy with APC;  Surgeon: Chris Bro MD;  Location: [facility]     ESOPHAGOSCOPY, GASTROSCOPY, DUODENOSCOPY (EGD), COMBINED N/A 3/8/2023    Procedure: Esophagoscopy, gastroscopy, duodenoscopy (EGD), combined;  Surgeon: Chris Bro MD;  Location: [facility]     ESOPHAGOSCOPY, GASTROSCOPY, DUODENOSCOPY (EGD), COMBINED N/A 5/3/2023    Procedure: Esophagoscopy, gastroscopy, duodenoscopy (EGD), combined;  Surgeon: Chris Bro MD;  Location: [facility]       ALLERGIES   No Known Allergies    FAMILY HISTORY:  Family History   Problem Relation Age of Onset     Diabetes Mother      Diabetes Father      Cancer No family hx of         no skin cancer     Glaucoma No family hx of      Macular Degeneration No family hx of        SOCIAL HISTORY:  Social History     Socioeconomic History     Marital status:    Tobacco Use     Smoking status: Former     Current packs/day: 0.00     Average packs/day: 1 pack/day for 32.2 years (32.2 ttl pk-yrs)     Types: Cigarettes, Cigars     Start date: 10/10/1978     Quit date: 2011     Years since quittin.8     Smokeless tobacco: Former   Substance and Sexual Activity     Alcohol use: No     Drug use: No     Sexual activity: Yes     Partners: Female     Social Drivers of Health     Financial Resource Strain: Not At  Risk (2/27/2024)    Received from [facility]    Financial Resource Strain      Is it hard for you to pay for the very basics like food, housing, medical care or heating?: No   Food Insecurity: No Food Insecurity (10/7/2024)    Received from [facility]     Hunger Vital Sign      Worried About Running Out of Food in the Last Year: Never true      Ran Out of Food in the Last Year: Never true   Transportation Needs: No Transportation Needs (10/7/2024)    Received from [facility]     PRAPARE - Transportation      Lack of Transportation (Medical): No      Lack of Transportation (Non-Medical): No   Physical Activity: Sufficiently Active (4/11/2023)    Received from [facility], [facility]    Exercise Vital Sign      Days of Exercise per Week: 4 days      Minutes of Exercise per Session: 40 min   Stress: Stress Concern Present (4/11/2023)    Received from [facility], [facility]    [place] [employer] - Occupational Stress Questionnaire      Feeling of Stress : Rather much    Received from [facility], [facility]    Social Connections   Interpersonal Safety: Not At Risk (10/6/2024)    Received from [facility]     Humiliation, Afraid, Rape, and Kick questionnaire      Fear of Current or Ex-Partner: No      Emotionally Abused: No      Physically Abused: No      Sexually Abused: No   Housing Stability: Low Risk  (10/7/2024)    Received from [facility]     Housing Stability Vital Sign      Unable to Pay for Housing in the Last Year: No      Number of Times Moved in the Last Year: 0      Homeless in the Last Year: No       ROS:   Constitutional: No fever, chills, or sweats. No weight gain/loss   ENT: No visual disturbance, ear ache, epistaxis, sore throat  Allergies/Immunologic: Negative.   Respiratory: No cough, hemoptysia  Cardiovascular: As per HPI  GI: No nausea, vomiting,  hematemesis, melena, or hematochezia  : No urinary frequency, dysuria, or hematuria  Integument: Negative  Psychiatric: Negative  Neuro: Negative  Endocrinology: Negative   Musculoskeletal: Negative    EXAM:  /71 (BP Location: Left arm, Patient Position: Chair, Cuff Size: Adult Regular)   Pulse 96   Wt 72.6 kg (160 lb 1.6 oz)   SpO2 96%   BMI 22.97 kg/m    In general, the patient is a pleasant male in no apparent distress.    HEENT: NC/AT.  PERRLA.  EOMI.  Sclerae white, not injected.  Nares clear.  Pharynx without erythema or exudate.  Dentition intact.    Neck: No adenopathy.  No thyromegaly. Carotids +4/4 bilaterally without bruits.  No jugular venous distension.   Heart: RRR. Normal S1, S2 splits physiologically. No murmur, rub, click, or gallop. The PMI is in the 5th ICS in the midclavicular line. There is no heave.    Lungs: CTA.  No ronchi, wheezes, rales.  No dullness to percussion.   Abdomen: Soft, nontender, nondistended. No organomegaly.  No bruits.   Extremities: No clubbing, cyanosis, or edema.  The pulses are +4/4 at the radial, brachial, femoral, popliteal, DP, and PT sites bilaterally.  No bruits are noted.  Neurologic: Alert and oriented to person/place/time, normal speech, gait and affect  Skin: No petechiae, purpura or rash.    Labs:  LIPID RESULTS:  Lab Results   Component Value Date    CHOL 137 11/30/2023    CHOL 130 07/07/2020    HDL 40 11/30/2023    HDL 33 (L) 07/07/2020    LDL 67 11/30/2023    LDL 68 07/07/2020    TRIG 151 (H) 11/30/2023    TRIG 144 07/07/2020    CHOLHDLRATIO 3.8 11/12/2015    NHDL 97 11/30/2023    NHDL 97 07/07/2020         CBC RESULTS:  Lab Results   Component Value Date    WBC 4.7 11/30/2023    WBC 6.8 11/18/2016    RBC 5.00 11/30/2023    RBC 4.76 11/18/2016    HGB 15.2 11/30/2023    HGB 14.4 11/18/2016    HCT 44.4 11/30/2023    HCT 41.2 11/18/2016    MCV 89 11/30/2023    MCV 87 11/18/2016    MCH 30.4 11/30/2023    MCH 30.3 11/18/2016    MCHC 34.2 11/30/2023     MCHC 35.0 11/18/2016    RDW 11.5 11/30/2023    RDW 11.8 11/18/2016     11/30/2023     11/18/2016       BMP RESULTS:  Lab Results   Component Value Date     08/02/2023     07/07/2020    POTASSIUM 4.4 08/02/2023    POTASSIUM 4.0 07/18/2022    POTASSIUM 4.4 07/07/2020    CHLORIDE 103 08/02/2023    CHLORIDE 108 07/18/2022    CHLORIDE 106 07/07/2020    CO2 25 08/02/2023    CO2 26 07/18/2022    CO2 29 07/07/2020    ANIONGAP 10 08/02/2023    ANIONGAP <1 (L) 07/18/2022    ANIONGAP 5 07/07/2020     (H) 08/02/2023     (H) 03/08/2023     (H) 07/18/2022     (H) 07/07/2020    BUN 13.4 08/02/2023    BUN 12 07/18/2022    BUN 15 07/07/2020    CR 0.79 08/02/2023    CR 0.79 07/07/2020    GFRESTIMATED >90 08/02/2023    GFRESTIMATED >90 07/07/2020    GFRESTBLACK >90 07/07/2020    MOUNIKA 10.2 08/02/2023    MOUNIKA 9.6 07/07/2020        A1C RESULTS:  Lab Results   Component Value Date    A1C 7.6 (H) 09/26/2023    A1C 6.8 (H) 07/07/2020     Lpa 50 mg/dl (normal < 30 mg/dl)    Procedures:    EKG: sin rhythm, RBBB    Coronary CT CAC:   Incidental Findings: 5 mm peripheral sub-solid left lower lobe pulmonary nodule on image #22 series #5. Recommended dedicated noncontrast CT of the chest in 3 months     Left main:  60   Left anterior descending artery:  311   Circumflex artery:  129   Right coronary artery:  1229   Posterior descending artery:  80   Other:  0     TOTAL CALCIUM SCORE:  1809     CALCIUM PERCENTILE SCORE:  A total calcium score of 1809 is between the 90 percentile and the 100 percentile for males between the ages of 65/69.     A score of 401 or higher is compatible with extensive atherosclerotic plaque and a high likelihood of at least one significant coronary artery narrowing.       Assessment and Plan:     We discussed the results with patient.  We discussed following items:    Medication Changes: None     Follow Up:   -Labs when able  -Ultrasounds when able - US Doppler/[place] and  segmental at both lowerextremities  -Follow up based on results     Follow the American Heart Association Diet and Lifestyle Recommendations:  -Limit saturated fat, trans fat, sodium, red meat, sweets and sugar-sweetened beverages. If you choose to eat red meat, compare labels and select the leanest cuts available.      Alexandro Ferreira MD, PhD  Professor of Medicine  Division of Cardiology     CC  Patient Care Team:  Jocelin Hu MD as PCP - General  Cheri Couch MD as MD (INTERNAL MEDICINE - ENDOCRINOLOGY, DIABETES & METABOLISM)  Gilles Sheikh MD as MD (Ophthalmology)  Cassandra Garcia MD as MD (Dermatology)  Cheri Couch MD as Assigned Endocrinology Provider  Valente Tapia Chi, OD as MD (Optometry)  Valente Tapia Chi, OD as MD (Optometry)  Gallo Clayton DO as MD (Neurology)  Alexey Romano MD as MD (Gastroenterology)  Chris Bro MD as MD (Gastroenterology)  Marcin Justice MD as Assigned Surgical Provider  Jovanna Singleton MD as MD (Gastroenterology)  Gallo Clayton DO as Assigned Neuroscience Provider  Lavelle Rolle LMFT as Therapist (Marriage & Family Therapist)  Lavelle Rolle LMFT as Assigned Behavioral Health Provider  Ayde Lima RD, LD as Registered Dietitian  Katalina Ramon PA-C as Physician Assistant (Dermatology)  Alexandro Ferreira MD as MD (Cardiovascular Disease)  Aneta Bill, PhD as Psychologist (Neuropsychology)  CHERI COUCH      Please do not hesitate to contact me if you have any questions/concerns.     Sincerely,     Alexandro Ferreira MD

## 2024-10-28 NOTE — NURSING NOTE
Chief Complaint   Patient presents with    New Patient     Jameson Ferreira pt, referred d/t HLD       Vitals were take, medications reconciled and EKG performed.    Matthew Howard, EMT    2:47 PM

## 2024-10-29 LAB
ATRIAL RATE - MUSE: 85 BPM
DIASTOLIC BLOOD PRESSURE - MUSE: NORMAL MMHG
INTERPRETATION ECG - MUSE: NORMAL
P AXIS - MUSE: 66 DEGREES
PR INTERVAL - MUSE: 164 MS
QRS DURATION - MUSE: 130 MS
QT - MUSE: 388 MS
QTC - MUSE: 461 MS
R AXIS - MUSE: -25 DEGREES
SYSTOLIC BLOOD PRESSURE - MUSE: NORMAL MMHG
T AXIS - MUSE: 31 DEGREES
VENTRICULAR RATE- MUSE: 85 BPM

## 2024-10-30 ENCOUNTER — TELEPHONE (OUTPATIENT)
Dept: CARDIOLOGY | Facility: CLINIC | Age: 64
End: 2024-10-30
Payer: COMMERCIAL

## 2024-10-30 NOTE — TELEPHONE ENCOUNTER
[facility] Call Center    Phone Message    May a detailed message be left on voicemail: yes     Reason for Call: Medication Question or concern regarding medication   Prescription Clarification  Name of Medication: Nitroglycerin tablets  Prescribing Provider:    Pharmacy: [place] [id number]     What on the order needs clarification? Wife calling regarding medication that was talked about with the nurse. Patient would like a prescription for medication. Please review and call or send script in. Thank you       Action Taken: Other: cardiology     Travel Screening: Not Applicable    Thank you!  [facility]       Date of Service:

## 2024-10-30 NOTE — TELEPHONE ENCOUNTER
Sounds like maybe you discussed a potential script for nitrotabs ?  I would have sent RX based on Jhon written order but the note is currently incomplete.

## 2024-10-31 NOTE — TELEPHONE ENCOUNTER
Writer called and talked with pt's wife. Informed her that Dr. Ferreira did not think pt needs nitroglycerin prescription. Pt's wife verbalized understanding.

## 2024-11-04 ENCOUNTER — TELEPHONE (OUTPATIENT)
Dept: ENDOCRINOLOGY | Facility: CLINIC | Age: 64
End: 2024-11-04
Payer: COMMERCIAL

## 2024-11-04 DIAGNOSIS — E11.9 TYPE 2 DIABETES MELLITUS WITHOUT COMPLICATION, WITHOUT LONG-TERM CURRENT USE OF INSULIN (H): Primary | ICD-10-CM

## 2024-11-04 DIAGNOSIS — M81.0 OSTEOPOROSIS, UNSPECIFIED OSTEOPOROSIS TYPE, UNSPECIFIED PATHOLOGICAL FRACTURE PRESENCE: ICD-10-CM

## 2024-11-05 ENCOUNTER — TELEPHONE (OUTPATIENT)
Dept: ENDOCRINOLOGY | Facility: CLINIC | Age: 64
End: 2024-11-05
Payer: COMMERCIAL

## 2024-11-05 DIAGNOSIS — S72.009S CLOSED FRACTURE OF HIP, UNSPECIFIED LATERALITY, SEQUELA: Primary | ICD-10-CM

## 2024-11-05 NOTE — TELEPHONE ENCOUNTER
DXA ordered  He will decide where he wants to get this.  ----- Message from Ethel TAMAYO sent at 11/5/2024  1:23 PM CST -----    ----- Message -----  From: Lucila Daugherty  Sent: 11/5/2024   1:07 PM CST  To: [facility]    Hello,    Patient currently has a DEXA scan scheduled through [facility] but he was asking if the order can be put into the [facility] system as well so that he can try and get his scan prior to his visit with Dr. Watson in December. Please let us know when the order is in and we will call to schedule.    Thank you,  Lucila Daugherty on 11/5/2024 at 1:06 PM

## 2024-11-05 NOTE — TELEPHONE ENCOUNTER
Patient confirmed scheduled appointment:  Date: 12/10  Time: 4:00  Visit type: return diabetes  Provider: Walter  Location: virtual  Testing/imagin/09 Labs  Additional notes: Spoke with patient and scheduled labs and follow up. Patient wants to see if he can get in sooner with [facility] for DEXA. Needs order for DEXA and then we will call and see if he can be seen sooner. Patient confirmed he should be in [place] for the follow up.    Cheri Watson MD  [id number] add pt to see me on 12/10/24 at 4 pm    Lucila Daugherty on 2024 at 1:11 PM

## 2024-11-06 ENCOUNTER — ANCILLARY PROCEDURE (OUTPATIENT)
Dept: ULTRASOUND IMAGING | Facility: CLINIC | Age: 64
End: 2024-11-06
Attending: INTERNAL MEDICINE
Payer: COMMERCIAL

## 2024-11-06 DIAGNOSIS — E78.5 HYPERLIPIDEMIA LDL GOAL <70: ICD-10-CM

## 2024-11-06 DIAGNOSIS — I10 ESSENTIAL HYPERTENSION: ICD-10-CM

## 2024-11-06 PROCEDURE — 93923 UPR/LXTR ART STDY 3+ LVLS: CPT | Performed by: STUDENT IN AN ORGANIZED HEALTH CARE EDUCATION/TRAINING PROGRAM

## 2024-11-09 ENCOUNTER — LAB (OUTPATIENT)
Dept: LAB | Facility: CLINIC | Age: 64
End: 2024-11-09
Payer: COMMERCIAL

## 2024-11-09 ENCOUNTER — HEALTH MAINTENANCE LETTER (OUTPATIENT)
Age: 64
End: 2024-11-09

## 2024-11-09 DIAGNOSIS — E11.9 TYPE 2 DIABETES MELLITUS WITHOUT COMPLICATION, WITHOUT LONG-TERM CURRENT USE OF INSULIN (H): ICD-10-CM

## 2024-11-09 DIAGNOSIS — M81.0 OSTEOPOROSIS, UNSPECIFIED OSTEOPOROSIS TYPE, UNSPECIFIED PATHOLOGICAL FRACTURE PRESENCE: ICD-10-CM

## 2024-11-09 LAB
ANION GAP SERPL CALCULATED.3IONS-SCNC: 10 MMOL/L (ref 7–15)
BUN SERPL-MCNC: 16.5 MG/DL (ref 8–23)
CALCIUM SERPL-MCNC: 10.4 MG/DL (ref 8.8–10.4)
CHLORIDE SERPL-SCNC: 102 MMOL/L (ref 98–107)
CHOLEST SERPL-MCNC: 134 MG/DL
CREAT SERPL-MCNC: 0.74 MG/DL (ref 0.67–1.17)
CREAT UR-MCNC: 64.1 MG/DL
EGFRCR SERPLBLD CKD-EPI 2021: >90 ML/MIN/1.73M2
ERYTHROCYTE [DISTWIDTH] IN BLOOD BY AUTOMATED COUNT: 11.9 % (ref 10–15)
EST. AVERAGE GLUCOSE BLD GHB EST-MCNC: 151 MG/DL
FASTING STATUS PATIENT QL REPORTED: YES
FASTING STATUS PATIENT QL REPORTED: YES
GLUCOSE SERPL-MCNC: 158 MG/DL (ref 70–99)
HBA1C MFR BLD: 6.9 %
HCO3 SERPL-SCNC: 27 MMOL/L (ref 22–29)
HCT VFR BLD AUTO: 41 % (ref 40–53)
HDLC SERPL-MCNC: 47 MG/DL
HGB BLD-MCNC: 14.3 G/DL (ref 13.3–17.7)
LDLC SERPL CALC-MCNC: 69 MG/DL
MCH RBC QN AUTO: 31.2 PG (ref 26.5–33)
MCHC RBC AUTO-ENTMCNC: 34.9 G/DL (ref 31.5–36.5)
MCV RBC AUTO: 90 FL (ref 78–100)
MICROALBUMIN UR-MCNC: <12 MG/L
MICROALBUMIN/CREAT UR: NORMAL MG/G{CREAT}
NONHDLC SERPL-MCNC: 87 MG/DL
PLATELET # BLD AUTO: 198 10E3/UL (ref 150–450)
POTASSIUM SERPL-SCNC: 4.3 MMOL/L (ref 3.4–5.3)
RBC # BLD AUTO: 4.58 10E6/UL (ref 4.4–5.9)
SHBG SERPL-SCNC: 42 NMOL/L (ref 11–80)
SODIUM SERPL-SCNC: 139 MMOL/L (ref 135–145)
TOTAL PROTEIN SERUM FOR ELP: 7.3 G/DL (ref 6.4–8.3)
TRIGL SERPL-MCNC: 88 MG/DL
TSH SERPL DL<=0.005 MIU/L-ACNC: 0.82 UIU/ML (ref 0.3–4.2)
WBC # BLD AUTO: 4.7 10E3/UL (ref 4–11)

## 2024-11-09 PROCEDURE — 86334 IMMUNOFIX E-PHORESIS SERUM: CPT | Mod: 26 | Performed by: STUDENT IN AN ORGANIZED HEALTH CARE EDUCATION/TRAINING PROGRAM

## 2024-11-09 PROCEDURE — 80061 LIPID PANEL: CPT | Performed by: PATHOLOGY

## 2024-11-09 PROCEDURE — 84155 ASSAY OF PROTEIN SERUM: CPT | Performed by: PATHOLOGY

## 2024-11-09 PROCEDURE — 84165 PROTEIN E-PHORESIS SERUM: CPT | Mod: 26 | Performed by: STUDENT IN AN ORGANIZED HEALTH CARE EDUCATION/TRAINING PROGRAM

## 2024-11-09 PROCEDURE — 36415 COLL VENOUS BLD VENIPUNCTURE: CPT | Performed by: PATHOLOGY

## 2024-11-09 PROCEDURE — 80048 BASIC METABOLIC PNL TOTAL CA: CPT | Performed by: PATHOLOGY

## 2024-11-09 PROCEDURE — 84165 PROTEIN E-PHORESIS SERUM: CPT | Mod: TC | Performed by: STUDENT IN AN ORGANIZED HEALTH CARE EDUCATION/TRAINING PROGRAM

## 2024-11-09 PROCEDURE — 82306 VITAMIN D 25 HYDROXY: CPT | Performed by: INTERNAL MEDICINE

## 2024-11-09 PROCEDURE — 84443 ASSAY THYROID STIM HORMONE: CPT | Performed by: PATHOLOGY

## 2024-11-09 PROCEDURE — 83036 HEMOGLOBIN GLYCOSYLATED A1C: CPT | Performed by: INTERNAL MEDICINE

## 2024-11-09 PROCEDURE — 99000 SPECIMEN HANDLING OFFICE-LAB: CPT | Performed by: PATHOLOGY

## 2024-11-09 PROCEDURE — 84270 ASSAY OF SEX HORMONE GLOBUL: CPT | Performed by: INTERNAL MEDICINE

## 2024-11-09 PROCEDURE — 82043 UR ALBUMIN QUANTITATIVE: CPT | Performed by: INTERNAL MEDICINE

## 2024-11-09 PROCEDURE — 84403 ASSAY OF TOTAL TESTOSTERONE: CPT | Performed by: INTERNAL MEDICINE

## 2024-11-09 PROCEDURE — 85027 COMPLETE CBC AUTOMATED: CPT | Performed by: PATHOLOGY

## 2024-11-09 PROCEDURE — 86334 IMMUNOFIX E-PHORESIS SERUM: CPT | Performed by: STUDENT IN AN ORGANIZED HEALTH CARE EDUCATION/TRAINING PROGRAM

## 2024-11-11 LAB
ALBUMIN SERPL ELPH-MCNC: 4.6 G/DL (ref 3.7–5.1)
ALPHA1 GLOB SERPL ELPH-MCNC: 0.3 G/DL (ref 0.2–0.4)
ALPHA2 GLOB SERPL ELPH-MCNC: 0.7 G/DL (ref 0.5–0.9)
B-GLOBULIN SERPL ELPH-MCNC: 0.8 G/DL (ref 0.6–1)
GAMMA GLOB SERPL ELPH-MCNC: 0.9 G/DL (ref 0.7–1.6)
LOCATION OF TASK: NORMAL
LOCATION OF TASK: NORMAL
M PROTEIN SERPL ELPH-MCNC: 0 G/DL
PROT PATTERN SERPL ELPH-IMP: NORMAL
PROT PATTERN SERPL IFE-IMP: NORMAL

## 2024-11-12 LAB
TESTOST FREE SERPL-MCNC: 4.18 NG/DL
TESTOST SERPL-MCNC: 251 NG/DL (ref 240–950)

## 2024-11-13 ENCOUNTER — TELEPHONE (OUTPATIENT)
Dept: ENDOCRINOLOGY | Facility: CLINIC | Age: 64
End: 2024-11-13
Payer: COMMERCIAL

## 2024-11-13 LAB
DEPRECATED CALCIDIOL+CALCIFEROL SERPL-MC: 42 UG/L (ref 20–75)
VITAMIN D2 SERPL-MCNC: 29 UG/L
VITAMIN D3 SERPL-MCNC: 13 UG/L

## 2024-11-13 NOTE — TELEPHONE ENCOUNTER
-  Dear Chema    I will defer on the LipA to Dr. Ferreira    Your vitamin D is normal, although is on the lower end of normal.  I would recommend that you take vitamin D3 at 2000 international units daily.  This will also help build up your bones.  You can buy this over-the-counter.    -[facility]  -    -    Lab on 11/09/2024   Component Date Value Ref Range Status    Sodium 11/09/2024 139  135 - 145 mmol/L Final    Potassium 11/09/2024 4.3  3.4 - 5.3 mmol/L Final    Chloride 11/09/2024 102  98 - 107 mmol/L Final    Carbon Dioxide (CO2) 11/09/2024 27  22 - 29 mmol/L Final    Anion Gap 11/09/2024 10  7 - 15 mmol/L Final    Urea Nitrogen 11/09/2024 16.5  8.0 - 23.0 mg/dL Final    Creatinine 11/09/2024 0.74  0.67 - 1.17 mg/dL Final    GFR Estimate 11/09/2024 >90  >60 mL/min/1.73m2 Final    eGFR calculated using 2021 CKD-EPI equation.    Calcium 11/09/2024 10.4  8.8 - 10.4 mg/dL Final    Reference intervals for this test were updated on 7/16/2024 to reflect our healthy population more accurately. There may be differences in the flagging of prior results with similar values performed with this method. Those prior results can be interpreted in the context of the updated reference intervals.    Glucose 11/09/2024 158 (H)  70 - 99 mg/dL Final    Patient Fasting > 8hrs? 11/09/2024 Yes   Final    Estimated Average Glucose 11/09/2024 151 (H)  <117 mg/dL Final    Hemoglobin A1C 11/09/2024 6.9 (H)  <5.7 % Final    Normal <5.7%   Prediabetes 5.7-6.4%    Diabetes 6.5% or higher     Note: Adopted from ADA consensus guidelines.    WBC Count 11/09/2024 4.7  4.0 - 11.0 10e3/uL Final    RBC Count 11/09/2024 4.58  4.40 - 5.90 10e6/uL Final    Hemoglobin 11/09/2024 14.3  13.3 - 17.7 g/dL Final    Hematocrit 11/09/2024 41.0  40.0 - 53.0 % Final    MCV 11/09/2024 90  78 - 100 fL Final    MCH 11/09/2024 31.2  26.5 - 33.0 pg Final    MCHC 11/09/2024 34.9  31.5 - 36.5 g/dL Final    RDW 11/09/2024 11.9  10.0 - 15.0 % Final    Platelet Count  11/09/2024 198  150 - 450 10e3/uL Final    Immunofixation ELP 11/09/2024 No monoclonal protein seen on immunofixation. Pathologic significance requires clinical correlation.  Camila Dia M.D., Ph.D.   Final    Signout Location if Remote 11/09/2024 [id number]   Final    TSH 11/09/2024 0.82  0.30 - 4.20 uIU/mL Final    Cholesterol 11/09/2024 134  <200 mg/dL Final    Triglycerides 11/09/2024 88  <150 mg/dL Final    Direct Measure HDL 11/09/2024 47  >=40 mg/dL Final    LDL Cholesterol Calculated 11/09/2024 69  <100 mg/dL Final    Non HDL Cholesterol 11/09/2024 87  <130 mg/dL Final    Patient Fasting > 8hrs? 11/09/2024 Yes   Final    Creatinine Urine mg/dL 11/09/2024 64.1  mg/dL Final    The reference ranges have not been established in urine creatinine. The results should be integrated into the clinical context for interpretation.    Albumin Urine mg/L 11/09/2024 <12.0  mg/L Final    The reference ranges have not been established in urine albumin. The results should be integrated into the clinical context for interpretation.    Albumin Urine mg/g Cr 11/09/2024    Final    Unable to calculate, urine albumin and/or urine creatinine is outside detectable limits.  Microalbuminuria is defined as an albumin:creatinine ratio of 17 to 299 for males and 25 to 299 for females. A ratio of albumin:creatinine of 300 or higher is indicative of overt proteinuria.  Due to biologic variability, positive results should be confirmed by a second, first-morning random or 24-hour timed urine specimen. If there is discrepancy, a third specimen is recommended. When 2 out of 3 results are in the microalbuminuria range, this is evidence for incipient nephropathy and warrants increased efforts at glucose control, blood pressure control, and institution of therapy with an angiotensin-converting-enzyme (ACE) inhibitor (if the patient can tolerate it).      Sex Hormone Binding Globulin 11/09/2024 42  11 - 80 nmol/L Final    Free Testosterone Calculated  11/09/2024 4.18  ng/dL Final    Male Aba Ranges:  Aba Stage I: Less than or equal to 0.37 ng/dL  Aba Stage II: 0.03-2.1 ng/dL  Aba Stage III: 0.10-9.8 ng/dL  Aba Stage IV: 3.5-16.9 ng/dL  Aba Stage V: 4.1-23.9 ng/dL    Testosterone Total 11/09/2024 251  240 - 950 ng/dL Final    Total Protein Serum for ELP 11/09/2024 7.3  6.4 - 8.3 g/dL Final    Albumin 11/09/2024 4.6  3.7 - 5.1 g/dL Final    Alpha 1 11/09/2024 0.3  0.2 - 0.4 g/dL Final    Alpha 2 11/09/2024 0.7  0.5 - 0.9 g/dL Final    Beta Globulin 11/09/2024 0.8  0.6 - 1.0 g/dL Final    Gamma Globulin 11/09/2024 0.9  0.7 - 1.6 g/dL Final    Monoclonal Peak 11/09/2024 0.0  <=0.0 g/dL Final    ELP Interpretation 11/09/2024 Essentially normal electrophoretic pattern. No obvious monoclonal proteins seen. Pathologic significance requires clinical correlation. Camila Dia M.D., Ph.D.   Final    Signout Location if Remote 11/09/2024 [email or web address]   Final

## 2024-11-14 ENCOUNTER — TELEPHONE (OUTPATIENT)
Dept: ENDOCRINOLOGY | Facility: CLINIC | Age: 64
End: 2024-11-14
Payer: COMMERCIAL

## 2024-11-14 NOTE — TELEPHONE ENCOUNTER
Pt has upcoming visit.  We could consider 24-hour urine for calcium and creatinine.  However the patient has recently fractured and so I would like to wait till he is a little bit more mobile before we do this.    -  Dear Chema    Here are your labs which actually look pretty good.  We would discuss more about options to treat your bones at your upcoming visit.    If you have any questions, please feel free to contact my nurse at [phone] select option #3 for triage nurse  or  option #1 for scheduling related questions.    Regards    Cheri Watson MD     Lab on 11/09/2024   Component Date Value Ref Range Status    25 OH Vitamin D2 11/09/2024 29  ug/L Final    25 OH Vitamin D3 11/09/2024 13  ug/L Final    25 OH Vit D Total 11/09/2024 42  20 - 75 ug/L Final    Season, race, dietary intake, and treatment affect the concentration of 25-hydroxy-Vitamin D. Values may decrease during winter months and increase during summer months. Values 20-29 ug/L may indicate Vitamin D insufficiency and values <20 ug/L may indicate Vitamin D deficiency.    Sodium 11/09/2024 139  135 - 145 mmol/L Final    Potassium 11/09/2024 4.3  3.4 - 5.3 mmol/L Final    Chloride 11/09/2024 102  98 - 107 mmol/L Final    Carbon Dioxide (CO2) 11/09/2024 27  22 - 29 mmol/L Final    Anion Gap 11/09/2024 10  7 - 15 mmol/L Final    Urea Nitrogen 11/09/2024 16.5  8.0 - 23.0 mg/dL Final    Creatinine 11/09/2024 0.74  0.67 - 1.17 mg/dL Final    GFR Estimate 11/09/2024 >90  >60 mL/min/1.73m2 Final    eGFR calculated using 2021 CKD-EPI equation.    Calcium 11/09/2024 10.4  8.8 - 10.4 mg/dL Final    Reference intervals for this test were updated on 7/16/2024 to reflect our healthy population more accurately. There may be differences in the flagging of prior results with similar values performed with this method. Those prior results can be interpreted in the context of the updated reference intervals.    Glucose 11/09/2024 158 (H)  70 - 99 mg/dL Final    Patient  Fasting > 8hrs? 11/09/2024 Yes   Final    Estimated Average Glucose 11/09/2024 151 (H)  <117 mg/dL Final    Hemoglobin A1C 11/09/2024 6.9 (H)  <5.7 % Final    Normal <5.7%   Prediabetes 5.7-6.4%    Diabetes 6.5% or higher     Note: Adopted from ADA consensus guidelines.    WBC Count 11/09/2024 4.7  4.0 - 11.0 10e3/uL Final    RBC Count 11/09/2024 4.58  4.40 - 5.90 10e6/uL Final    Hemoglobin 11/09/2024 14.3  13.3 - 17.7 g/dL Final    Hematocrit 11/09/2024 41.0  40.0 - 53.0 % Final    MCV 11/09/2024 90  78 - 100 fL Final    MCH 11/09/2024 31.2  26.5 - 33.0 pg Final    MCHC 11/09/2024 34.9  31.5 - 36.5 g/dL Final    RDW 11/09/2024 11.9  10.0 - 15.0 % Final    Platelet Count 11/09/2024 198  150 - 450 10e3/uL Final    Immunofixation ELP 11/09/2024 No monoclonal protein seen on immunofixation. Pathologic significance requires clinical correlation.  Camila Dia M.D., Ph.D.   Final    Signout Location if Remote 11/09/2024 [email or web address]   Final    TSH 11/09/2024 0.82  0.30 - 4.20 uIU/mL Final    Cholesterol 11/09/2024 134  <200 mg/dL Final    Triglycerides 11/09/2024 88  <150 mg/dL Final    Direct Measure HDL 11/09/2024 47  >=40 mg/dL Final    LDL Cholesterol Calculated 11/09/2024 69  <100 mg/dL Final    Non HDL Cholesterol 11/09/2024 87  <130 mg/dL Final    Patient Fasting > 8hrs? 11/09/2024 Yes   Final    Creatinine Urine mg/dL 11/09/2024 64.1  mg/dL Final    The reference ranges have not been established in urine creatinine. The results should be integrated into the clinical context for interpretation.    Albumin Urine mg/L 11/09/2024 <12.0  mg/L Final    The reference ranges have not been established in urine albumin. The results should be integrated into the clinical context for interpretation.    Albumin Urine mg/g Cr 11/09/2024    Final    Unable to calculate, urine albumin and/or urine creatinine is outside detectable limits.  Microalbuminuria is defined as an albumin:creatinine ratio of 17 to 299 for males and 25 to 299 for  females. A ratio of albumin:creatinine of 300 or higher is indicative of overt proteinuria.  Due to biologic variability, positive results should be confirmed by a second, first-morning random or 24-hour timed urine specimen. If there is discrepancy, a third specimen is recommended. When 2 out of 3 results are in the microalbuminuria range, this is evidence for incipient nephropathy and warrants increased efforts at glucose control, blood pressure control, and institution of therapy with an angiotensin-converting-enzyme (ACE) inhibitor (if the patient can tolerate it).      Sex Hormone Binding Globulin 11/09/2024 42  11 - 80 nmol/L Final    Free Testosterone Calculated 11/09/2024 4.18  ng/dL Final    Male Aba Ranges:  Aba Stage I: Less than or equal to 0.37 ng/dL  Aba Stage II: 0.03-2.1 ng/dL  Aba Stage III: 0.10-9.8 ng/dL  Aba Stage IV: 3.5-16.9 ng/dL  Aba Stage V: 4.1-23.9 ng/dL    Testosterone Total 11/09/2024 251  240 - 950 ng/dL Final    Total Protein Serum for ELP 11/09/2024 7.3  6.4 - 8.3 g/dL Final    Albumin 11/09/2024 4.6  3.7 - 5.1 g/dL Final    Alpha 1 11/09/2024 0.3  0.2 - 0.4 g/dL Final    Alpha 2 11/09/2024 0.7  0.5 - 0.9 g/dL Final    Beta Globulin 11/09/2024 0.8  0.6 - 1.0 g/dL Final    Gamma Globulin 11/09/2024 0.9  0.7 - 1.6 g/dL Final    Monoclonal Peak 11/09/2024 0.0  <=0.0 g/dL Final    ELP Interpretation 11/09/2024 Essentially normal electrophoretic pattern. No obvious monoclonal proteins seen. Pathologic significance requires clinical correlation. Camila Dia M.D., Ph.D.   Final    Signout Location if Remote 11/09/2024 [email or web address]   Final

## 2024-11-25 NOTE — PROGRESS NOTES
Outcome for 11/25/24 2:39 PM: Data uploaded on Dexcom  Bobbilynn Grossaint, VF  Outcome for 12/06/24 10:50 AM: Patient is not checking blood sugars -Insurance doesn't cover and he is not checking manually. Pt states had labs done 11/9/24.  Bobbilynn Grossaint, DEBBIE    Patient is showing 5/5 MNCM met.   Bobbi Grossaint, VF

## 2024-12-09 ENCOUNTER — ANCILLARY PROCEDURE (OUTPATIENT)
Dept: BONE DENSITY | Facility: CLINIC | Age: 64
End: 2024-12-09
Attending: INTERNAL MEDICINE
Payer: COMMERCIAL

## 2024-12-09 DIAGNOSIS — S72.009S CLOSED FRACTURE OF HIP, UNSPECIFIED LATERALITY, SEQUELA: ICD-10-CM

## 2024-12-09 PROCEDURE — 77080 DXA BONE DENSITY AXIAL: CPT | Performed by: INTERNAL MEDICINE

## 2024-12-09 NOTE — LETTER
December 16, 2024      Chema Mccullough  [address] [place] [address]      Dear Chema    As discussed, your bone density is normal.  However the fact that you fracture, means that you have osteoporosis even in the setting of normal bone density.  I still think you would benefit from the alendronate as per our discussion.    If you have any questions, please feel free to contact my nurse at [phone] select option #3 for triage nurse  or  option #1 for scheduling related questions.    Regards    Cheri Watson MD     Resulted Orders   DX Bone Density    [address] [address]  Phone: [phone]   Fax: [phone]      Patient name:   Chema Mccullough  Patient demographics:  64 year old White Male   History:  Diabetes, left hip hardware  Current treatments:  Calcium, Cholesterol Lowering Drugs, Diabetes   Medications, Vitamin D  Scan:    [employer]        Impression  Based on BMD diagnosis is consistent with normal bone density based on WHO   criteria 1,2,5.        Results   Lumbar Spine  T-score -0.6 (L1-4), BMD is 1.146 g/cm2.       Right femoral neck  T-score 0.2       Right total hip  T-score -0.2, BMD is 1.074 g/cm2.      Interval change 3  No prior study available for comparison.    Fracture risk 4  Bone density is normal, fracture risk calculation is not indicated      Principal result :  Ousmane Romeo MD, MD [facility]   Division of Endocrinology and Diabetes    Department of Medicine          Technical quality  Satisfactory.   Abnormal vertebrae may be excluded from analysis if there is more than a   1.0 T-score difference between the vertebrae in question and adjacent   vertebrae. Note the wide range in BMD values and T-scores within the   lumbar spine. In the circumstances, the lumbar spine is represented by   L1-L4      References:  1. WHO categories:          T-score > -1.0   = normal             .                                T-score -1.0 to -2.5 = low bone density  T-score < -2.5 = osteoporosis        .     2. 2015 ISCD official position statements:  www.[email or web address].org.    Z-SCORE CRITERIA  -Within the expected range for age: Z-score above -2.0.  -Below the expected range for age: Z-score of -2.0 or lower.     3.  Today's examination is compared to the technically similar prior study   of the total hip and femur if available. Only changes deemed likely to be   significant based on historical data are reported.  According to the ISCD position statements, total hip rather than femoral   neck regions are to be compared because larger areas give better   precision.  LSC = least significant changes at the [facility] (historical   data)   AP spine =  0.032 g/cm2  (6/26/2007)   Left hip = 0.029 g/cm2  (6/15/2007)   Right hip = 0.018 g/cm2  (6/15/2007)   Left mid radius = 0.043 g/cm2  (6/26/2007)      4 Fracture risk is calculated in patients aged 40 to 90 years old with low   bone density not on osteoporosis treatment. A 10 year fracture risk of 3%   and higher for hip fracture and 20% and higher for major osteoporotic   fracture is considered higher than acceptable risk and might be an   indication for medical treatment.     5.  By definition, osteoporosis may be diagnosed in the presence or with   the history of a low trauma or fragility fracture.  Fragility and low   trauma fracture is defined as a fracture resulting from the force of a   fall from a standing height or less or a bone that breaks under conditions   that would not cause a normal bone to break.       6. NOF Physician's Guideline Website address:  www.nof.org.        If you have any questions or concerns, please call the clinic at the number listed above.       Sincerely,      Cheri Watson MD

## 2024-12-10 ENCOUNTER — VIRTUAL VISIT (OUTPATIENT)
Dept: ENDOCRINOLOGY | Facility: CLINIC | Age: 64
End: 2024-12-10
Payer: COMMERCIAL

## 2024-12-10 DIAGNOSIS — S72.009S CLOSED FRACTURE OF HIP, UNSPECIFIED LATERALITY, SEQUELA: Primary | ICD-10-CM

## 2024-12-10 DIAGNOSIS — E27.9 ADRENAL NODULE (H): ICD-10-CM

## 2024-12-10 PROCEDURE — 99215 OFFICE O/P EST HI 40 MIN: CPT | Mod: 95 | Performed by: INTERNAL MEDICINE

## 2024-12-10 RX ORDER — ALENDRONATE SODIUM 70 MG/1
70 TABLET ORAL
Qty: 4 TABLET | Refills: 1 | Status: SHIPPED | OUTPATIENT
Start: 2024-12-10

## 2024-12-10 RX ORDER — FAMOTIDINE 20 MG
1 TABLET ORAL DAILY
Qty: 90 CAPSULE | Refills: 4 | Status: SHIPPED | OUTPATIENT
Start: 2024-12-10

## 2024-12-10 RX ORDER — DEXAMETHASONE 1 MG
1 TABLET ORAL ONCE
Qty: 1 TABLET | Refills: 0 | Status: SHIPPED | OUTPATIENT
Start: 2024-12-10 | End: 2024-12-10

## 2024-12-10 NOTE — NURSING NOTE
Current patient location:  [facility] - work     Is the patient currently in the state of [place]? YES    Visit mode:VIDEO    If the visit is dropped, the patient can be reconnected by:VIDEO VISIT: Send to e-mail at: [email or web address]    Will anyone else be joining the visit? NO  (If patient encounters technical issues they should call [phone]    Are changes needed to the allergy or medication list? Yes      Are refills needed on medications prescribed by this physician? Discuss with provider    Rooming Documentation:  Not applicable    Reason for visit: ELOISA BARBOUR

## 2024-12-10 NOTE — PROGRESS NOTES
Video-Visit Details    Type of service:  Video Visit    Virtual visit conducted by Parish.      Originating Location (pt. Location): OFFICE    Distant Location (provider location):  Off site    Mode of Communication:  Video Conference via [employer]    Physician has received verbal consent for a Video Visit from the patient? YES    12/10/24    Start time 410, stop time 440, chart review, documentation time, coordination of care, 10 minutes.  Total time spent day of encounter 40 minutes.    [facility]  Endocrinology  Cheri Watson MD    Chief Complaint:   Diabetes    History of Present Illness:   Chema Mccullough is a 64  year old male with a history of type 2 diabetes and goiter who presents for follow up evaluation    #1 Diabetes mellitus type 2  The patient was diagnosed with prediabetes in 2004 and then in 2009 found to have fasting blood sugar of 184 with A1c of 7.5%.  He was started on Metformin XR in February 2009, dose increased to 500 mg twice daily in March 2013.  A1c from 2013 - 2016 was consistently in the 8% range.  November 2015 evaluation significant for detectable C-peptide of 3.2.  June 2016 his Metformin was increased to 2000 mg daily.  December 2017 he was started on Amaryl 1 mg daily as he was experiencing postprandial hyperglycemia. A1c between April 2018-October 2018 consistently in 6% range. August 2018 PSA was 0.8, creatinine was 0.75, hemoglobin A1c of 6.6, urine for microalbumin was negative, and LDL was 78.  August 2019 hemoglobin A1c was 6.9.  During his previous visit on 8/16/19 SGLT-2 inhibitors and GLP-1 agonists were discussed, but the patient was hesitant to start them as they were new.  August 2019 hemoglobin A1c of 6.9.  February 2020 hemoglobin A1c is 7.1.    In February 2020, I had the patient try Jardiance at 10 mg daily and stop his glimepiride.   July 2020 hemoglobin A1c is 6.8, LDL 68, TSH 0.85.December 2020 urine for protein was negative, creatinine was 0.82,  hemoglobin A1c of 6.9, LDL was 80.   August 2021 LDL was 73, August 2021 when hemoglobin A1c 6.7, TSH 0.73  December 2021 hemoglobin A1c at 7.4, March 2022 hemoglobin A1c of 7.1, July 2022 hemoglobin A1c at 7.0.     February 2023 hemoglobin A1c at 7.6.  Potassium 4.4, creatinine 0.79, LDL was 101, HDL was 41, triglycerides 98 n May 2023, hemoglobin A1c is 7.8.    Interval history: Patient now on Jardiance 25 mg daily, metformin 2000 mg daily, Crestor 20 mg daily.  He actually reports hypotension.  November 2024 LDL 69.  November 2024 hemoglobin A1c at 6.9.  Diabetes monitoring and complications:  CAD: On Crestor 20 mg daily.  On Jardiance  25 mg daily.  November 2024 LDL 69.  Last eye exam results: October 2022 eye exam unremarkable  Microalbuminuria: Negative in November 2024  Neuropathy: No  HTN: No  On Statin: Yes  On Aspirin: Yes  Depression: No  Erectile dysfunction: No    #2 Hyperlipidemia with significant coronary artery calcification noted on CT scan in July 2024  Stress test in 2016 was unremarkable. December 2017 LDL was 44. At that time he was taking Lipitor 40 mg daily. August 2019 LDL was 78.  August 2021 LDL was 73. Feb 2023 LDL at 101.n March 2023, EGD showed stomach ulceration attributed to aspirin use.    Interval History  Now on Crestor 20 mg daily.  CT scan in July 2024 shows significant coronary artery calcification.    #3 Goiter - March 2023 US showed bilateral subcentimeter nodules  June 2016, he was noted to have a multinodular goiter with several nodules roughly 1 cm in size. October 2016 formal neck ultrasound showed multinodular goiter, with  largest nodules on the right side at 1.1 cm in size (two nodules) and the largest nodule on the left side at 1.2 cm in size.  The patient had declined ultrasound-guided FNA in October 2016. Eval with floor ultrasound in April 2017 showed the following: Right anterior nodule 1.0 x 0.6 x 0.6 cm in size, right posterior/inferior nodule at 1.0 x 0.5 x 1.0  cm in size, left inferior nodule at 0.6 x 0.5 x 0.7 cm in size. April 2018 US showed no interval change in thyroid nodules. Neck ultrasound performed in August 2020 continues to show small nodules bilaterally which are unchanged in size. Repeat neck ultrasound in April 2021 showed multiple nodules bilaterally, the largest about 1.1 cm in size-none met criteria for biopsy with no change compared with the previous exam in 2020. February 2023 TSH was 0.55,  Repeat neck ultrasound in March 2023 showed bilateral multiple subcentimeter nodules.      Interval History  Repeat thyroid ultrasound in March 2023 showed multiple subcentimeter nodules    #4 iron deficiency anemia - ulcer see on March 2023 EGD and colonscopy  Noted on blood draw in February 2023 with noted low iron at 50, Ferritin low at 8, hemoglobin 12.5.  Of note, the patient reports a history of colonoscopy in 2019 which showed some diverticulosis. March 2023 EGD and colonoscopy significant for stomach ulcer - h. Pylori negative-this was attributed to aspirin use.  The patient has now held his baby aspirin    Interval history: The patient reports that no evidence of source for his iron deficiency was identified.  However recent hemoglobin in November 2024 was normal.    #5 Word finding difficulty-possible mild cognitive impairment-observe for now  The patient's wife has noted patient has difficulty finding words to explain his thoughts.  The patient reports a history of cognitive decline in his mother when she was in her 90s.    Interval history: The patient has been seen at [facility] as well as the [facility].  Extensive evaluation completed.  General recommendation is observation at this time, with reassessment in 1 year.  Currently on Aricept.  October 2024 showed mild volume loss with minimal presumed chronic small vessel ischemic changes.    #6 status post left hip fracture status post fall  The patient is status post fall in October 2024.   "This resulted in a left hip fracture for which he required \"pins\" to be placed.  DEXA scan done in December 2024 (preliminary read) actually shows normal bone density.  Evaluation for secondary causes of low bone density testosterone, vitamin D, serum calcium, serum protein electrophoresis, serum immunofixation, were all normal.  However TTG and 24-hour urine was not done.    #7 incidental left adrenal nodule roughly 2.8 cm noted on September 2024 CT scan  This was incidentally noted at 2.8 cm in size.    Review of Systems:   Pertinent items are noted in HPI.  All other systems are negative.    Active Medications:     Current Outpatient Medications:     acetaminophen (TYLENOL) 500 MG tablet, Take 1,000 mg by mouth., Disp: , Rfl:     apixaban ANTICOAGULANT (ELIQUIS) 2.5 MG tablet, Take 2.5 mg by mouth., Disp: , Rfl:     donepezil (ARICEPT) 10 MG tablet, Take one half tablet daily for 2 weeks. If symptoms have not improved, increase to 1 tablet daily., Disp: , Rfl:     empagliflozin (JARDIANCE) 25 MG TABS tablet, Take 1 tablet (25 mg) by mouth daily, Disp: 90 tablet, Rfl: 4    fish oil-omega-3 fatty acids 500 MG capsule, Take 500 mg by mouth daily, Disp: , Rfl:     magnesium oxide (MAG-OX) 400 MG tablet, Take 400 mg by mouth., Disp: , Rfl:     metFORMIN (GLUCOPHAGE) 1000 MG tablet, Take 1 tablet (1,000 mg) by mouth 2 times daily (with meals), Disp: 180 tablet, Rfl: 3    pantoprazole (PROTONIX) 40 MG EC tablet, Take 40 mg by mouth., Disp: , Rfl:     psyllium (REGULOID) 0.52 g capsule, Take 1 capsule by mouth daily., Disp: , Rfl:     rosuvastatin (CRESTOR) 10 MG tablet, Take 2 tablets (20 mg) by mouth daily, Disp: 180 tablet, Rfl: 3    senna-docusate (SENOKOT-S/PERICOLACE) 8.6-50 MG tablet, Take 1 tablet by mouth., Disp: , Rfl:     Current Facility-Administered Medications:     lidocaine 1 % injection 2 mL, 2 mL, INTRA-ARTICULAR, Once, Camila Baeza MD      Allergies:   Patient has no known allergies.      Past " "Medical History:  Type 2 diabetes mellitus  Goiter  Coronary arteriosclerosis  Hyperlipidemia  Obesity  Colon adenoma     Past Surgical History:  No past surgical history on file.    Family History:   Diabetes: Mother, father      Social History:   Social History     Tobacco Use    Smoking status: Former     Current packs/day: 0.00     Average packs/day: 1 pack/day for 32.2 years (32.2 ttl pk-yrs)     Types: Cigarettes, Cigars     Start date: 10/10/1978     Quit date: 2011     Years since quittin.9    Smokeless tobacco: Former   Substance Use Topics    Alcohol use: No    Drug use: No        Physical Exam:   GENERAL: Healthy, alert and no distress\",\"EYES: Eyes grossly normal to inspection.  No discharge or erythema, or obvious scleral/conjunctival abnormalities.\",\"RESP: No audible wheeze, cough, or visible cyanosis.  No visible retractions or increased work of breathing.  \",\"SKIN: Visible skin clear. No significant rash, abnormal pigmentation or lesions.\",\"NEURO: Cranial nerves grossly intact.  Mentation and speech appropriate for age, although the patient does seem to have difficulty noting the precise words to explain his thoughts..\",\"PSYCH: Mentation appears normal, affect normal/bright, judgement and insight intact, normal speech and appearance well-groomed.    No visits with results within 1 Month(s) from this visit.   Latest known visit with results is:   Lab on 2024   Component Date Value Ref Range Status    25 OH Vitamin D2 2024 29  ug/L Final    25 OH Vitamin D3 2024 13  ug/L Final    25 OH Vit D Total 2024 42  20 - 75 ug/L Final    Season, race, dietary intake, and treatment affect the concentration of 25-hydroxy-Vitamin D. Values may decrease during winter months and increase during summer months. Values 20-29 ug/L may indicate Vitamin D insufficiency and values <20 ug/L may indicate Vitamin D deficiency.    Sodium 2024 139  135 - 145 mmol/L Final    Potassium " 11/09/2024 4.3  3.4 - 5.3 mmol/L Final    Chloride 11/09/2024 102  98 - 107 mmol/L Final    Carbon Dioxide (CO2) 11/09/2024 27  22 - 29 mmol/L Final    Anion Gap 11/09/2024 10  7 - 15 mmol/L Final    Urea Nitrogen 11/09/2024 16.5  8.0 - 23.0 mg/dL Final    Creatinine 11/09/2024 0.74  0.67 - 1.17 mg/dL Final    GFR Estimate 11/09/2024 >90  >60 mL/min/1.73m2 Final    eGFR calculated using 2021 CKD-EPI equation.    Calcium 11/09/2024 10.4  8.8 - 10.4 mg/dL Final    Reference intervals for this test were updated on 7/16/2024 to reflect our healthy population more accurately. There may be differences in the flagging of prior results with similar values performed with this method. Those prior results can be interpreted in the context of the updated reference intervals.    Glucose 11/09/2024 158 (H)  70 - 99 mg/dL Final    Patient Fasting > 8hrs? 11/09/2024 Yes   Final    Estimated Average Glucose 11/09/2024 151 (H)  <117 mg/dL Final    Hemoglobin A1C 11/09/2024 6.9 (H)  <5.7 % Final    Normal <5.7%   Prediabetes 5.7-6.4%    Diabetes 6.5% or higher     Note: Adopted from ADA consensus guidelines.    WBC Count 11/09/2024 4.7  4.0 - 11.0 10e3/uL Final    RBC Count 11/09/2024 4.58  4.40 - 5.90 10e6/uL Final    Hemoglobin 11/09/2024 14.3  13.3 - 17.7 g/dL Final    Hematocrit 11/09/2024 41.0  40.0 - 53.0 % Final    MCV 11/09/2024 90  78 - 100 fL Final    MCH 11/09/2024 31.2  26.5 - 33.0 pg Final    MCHC 11/09/2024 34.9  31.5 - 36.5 g/dL Final    RDW 11/09/2024 11.9  10.0 - 15.0 % Final    Platelet Count 11/09/2024 198  150 - 450 10e3/uL Final    Immunofixation ELP 11/09/2024 No monoclonal protein seen on immunofixation. Pathologic significance requires clinical correlation.  Camila Dia M.D., Ph.D.   Final    Signout Location if Remote 11/09/2024 [id number]   Final    TSH 11/09/2024 0.82  0.30 - 4.20 uIU/mL Final    Cholesterol 11/09/2024 134  <200 mg/dL Final    Triglycerides 11/09/2024 88  <150 mg/dL Final    Direct Measure HDL  11/09/2024 47  >=40 mg/dL Final    LDL Cholesterol Calculated 11/09/2024 69  <100 mg/dL Final    Non HDL Cholesterol 11/09/2024 87  <130 mg/dL Final    Patient Fasting > 8hrs? 11/09/2024 Yes   Final    Creatinine Urine mg/dL 11/09/2024 64.1  mg/dL Final    The reference ranges have not been established in urine creatinine. The results should be integrated into the clinical context for interpretation.    Albumin Urine mg/L 11/09/2024 <12.0  mg/L Final    The reference ranges have not been established in urine albumin. The results should be integrated into the clinical context for interpretation.    Albumin Urine mg/g Cr 11/09/2024    Final    Unable to calculate, urine albumin and/or urine creatinine is outside detectable limits.  Microalbuminuria is defined as an albumin:creatinine ratio of 17 to 299 for males and 25 to 299 for females. A ratio of albumin:creatinine of 300 or higher is indicative of overt proteinuria.  Due to biologic variability, positive results should be confirmed by a second, first-morning random or 24-hour timed urine specimen. If there is discrepancy, a third specimen is recommended. When 2 out of 3 results are in the microalbuminuria range, this is evidence for incipient nephropathy and warrants increased efforts at glucose control, blood pressure control, and institution of therapy with an angiotensin-converting-enzyme (ACE) inhibitor (if the patient can tolerate it).      Sex Hormone Binding Globulin 11/09/2024 42  11 - 80 nmol/L Final    Free Testosterone Calculated 11/09/2024 4.18  ng/dL Final    Male Aba Ranges:  Aba Stage I: Less than or equal to 0.37 ng/dL  Aba Stage II: 0.03-2.1 ng/dL  Aba Stage III: 0.10-9.8 ng/dL  Aba Stage IV: 3.5-16.9 ng/dL  Aba Stage V: 4.1-23.9 ng/dL    Testosterone Total 11/09/2024 251  240 - 950 ng/dL Final    Total Protein Serum for ELP 11/09/2024 7.3  6.4 - 8.3 g/dL Final    Albumin 11/09/2024 4.6  3.7 - 5.1 g/dL Final    Alpha 1 11/09/2024  0.3  0.2 - 0.4 g/dL Final    Alpha 2 11/09/2024 0.7  0.5 - 0.9 g/dL Final    Beta Globulin 11/09/2024 0.8  0.6 - 1.0 g/dL Final    Gamma Globulin 11/09/2024 0.9  0.7 - 1.6 g/dL Final    Monoclonal Peak 11/09/2024 0.0  <=0.0 g/dL Final    ELP Interpretation 11/09/2024 Essentially normal electrophoretic pattern. No obvious monoclonal proteins seen. Pathologic significance requires clinical correlation. Camila Dia M.D., Ph.D.   Final    Signout Location if Remote 11/09/2024 [email or web address]   Final         Assessment and Plan:  #1 Diabetes mellitus type 2  Extensive discussion with patient.  Continue with metformin 1000 mg twice daily and Jardiance 25 mg daily.  I think his recent elevation of his hemoglobin A1c was due to his recent fall, fracture, and subsequent immobility.  I do think he is on a good program at this time.    I did talk with the patient the possibility of a CGM.  I am not sure if this is covered by insurance.  We will ask.  If this is not covered by his insurance, stelo could be an option for him.  I think this will help manage his dietary intake and glycemic measures.  Continue with dietary lifestyle changes.    #2 Hyperlipidemia with noted severe coronary artery calcification on July 2024 CT scan  Doing well on Crestor 20 mg daily.  He has seen cardiology.    #3 Goiter - March 2023 US showed bilateral subcentimeter nodules  Repeat thyroid ultrasound in March 2023 showed multiple subcentimeter nodules will consider repeating in 2026.    #4 iron deficiency anemia - ulcer (h pylori negative)seen on March 2023 EGD and colonscopy  Not discussed today.    #5 Word finding difficulty-possible mild cognitive impairment-observe for now  Currently on Aricept.    #6 status post left hip fracture status post fall  Official DEXA report is negative.  However the bone density is normal with both T and Z-scores within the normal range.  Although we have not yet done a TTG or 24-hour urine, given his normal DEXA scan, I do not  think we necessarily need to do this at this time.  That being said, we will have the patient take Fosamax 70 mg weekly starting in January 2025 (when he returns) and if he tolerates this program, we can have a conversation of whether we should continue with the Fosamax or perhaps change over to Reclast.    #7 incidental left adrenal nodule roughly 2.8 cm noted on September 2024 CT scan  Patient will need repeat CT scan in 2025.  However we will have patient do labs when he returns from his trip (roughly January 2025) for overnight dexamethasone suppression test, and adrenal labs as noted below.  However he clinically does not have any evidence for adrenal over secretion.    Will plan for return visit in 6 months and again in 1 year.  Labs below in January 2025.    Orders Placed This Encounter   Procedures    Renin activity    Aldosterone    DHEA sulfate    Metanephrines Plasma Free    Cortisol

## 2024-12-10 NOTE — PATIENT INSTRUCTIONS
Vitamin d3 at 1000 international units    Stelo sensor    Consider sockwell compression socks while flying  -  Start the fosamax in January 2025  -  Consider pilates/yoga  -  Pt to do at 1 mg  dexamethasone at 11 pm on 1/19/25 and do labs on 1/20/25, return visit in 6-7 months and again in 1 year    -  EXAM: CT CHEST WO IV CONT  LOCATION:  [facility]  DATE: 9/13/2024 cardiac CT coronary    INDICATION: Abnormal finding of a lung nodule on CT calcium score Ct, Solitary pulmonary nodule  COMPARISON: Cardiac CT 7/17/2024 reviewed.  TECHNIQUE: CT chest without IV contrast. Multiplanar reformats were obtained. Dose reduction techniques were used.  CONTRAST: None.    FINDINGS:  LUNGS AND PLEURA: A 5 mm nodule in the anterolateral left lower lobe is unchanged in size since 7/17/2024 and demonstrates some in situ calcification more conspicuous than prior, consistent with benign pulmonary granuloma. Lungs otherwise clear. No pleural effusion.    MEDIASTINUM/AXILLAE: No adenopathy. No pericardial effusion.    CORONARY ARTERY CALCIFICATION: Severe.    UPPER ABDOMEN: Benign left adrenal adenoma measuring 1.8 x 2.8 cm.    MUSCULOSKELETAL: Mild scattered degenerative changes in the spine.    IMPRESSION:  Benign calcified granuloma left lower lobe. No further follow-up needed.

## 2024-12-10 NOTE — LETTER
12/10/2024       RE: Chema Mccullough  [address] [place] [address]     Dear Colleague,    Thank you for referring your patient, Chema Mccullough, to the [facility] ENDOCRINOLOGY CLINIC [place] [facility]. Please see a copy of my visit note below.    Outcome for 11/25/24 2:39 PM: Data uploaded on Dexcom  Bobbilynn Grossaint, VF  Outcome for 12/06/24 10:50 AM: Patient is not checking blood sugars -Insurance doesn't cover and he is not checking manually. Pt states had labs done 11/9/24.  Bobbilynn Grossaint, VF    Patient is showing 5/5 MNCM met.   Bobbi Grossaint, VF                      Video-Visit Details    Type of service:  Video Visit    Virtual visit conducted by Parish.      Originating Location (pt. Location): OFFICE    Distant Location (provider location):  Off site    Mode of Communication:  Video Conference via "[employer]    Physician has received verbal consent for a Video Visit from the patient? YES    12/10/24    Start time 410, stop time 440, chart review, documentation time, coordination of care, 10 minutes.  Total time spent day of encounter 40 minutes.    [facility]  Endocrinology  Cheri Watson MD    Chief Complaint:   Diabetes    History of Present Illness:   Chema Mccullough is a 64  year old male with a history of type 2 diabetes and goiter who presents for follow up evaluation    #1 Diabetes mellitus type 2  The patient was diagnosed with prediabetes in 2004 and then in 2009 found to have fasting blood sugar of 184 with A1c of 7.5%.  He was started on Metformin XR in February 2009, dose increased to 500 mg twice daily in March 2013.  A1c from 2013 - 2016 was consistently in the 8% range.  November 2015 evaluation significant for detectable C-peptide of 3.2.  June 2016 his Metformin was increased to 2000 mg daily.  December 2017 he was started on Amaryl 1 mg daily as he was experiencing postprandial  hyperglycemia. A1c between April 2018-October 2018 consistently in 6% range. August 2018 PSA was 0.8, creatinine was 0.75, hemoglobin A1c of 6.6, urine for microalbumin was negative, and LDL was 78.  August 2019 hemoglobin A1c was 6.9.  During his previous visit on 8/16/19 SGLT-2 inhibitors and GLP-1 agonists were discussed, but the patient was hesitant to start them as they were new.  August 2019 hemoglobin A1c of 6.9.  February 2020 hemoglobin A1c is 7.1.    In February 2020, I had the patient try Jardiance at 10 mg daily and stop his glimepiride.   July 2020 hemoglobin A1c is 6.8, LDL 68, TSH 0.85.December 2020 urine for protein was negative, creatinine was 0.82, hemoglobin A1c of 6.9, LDL was 80.   August 2021 LDL was 73, August 2021 when hemoglobin A1c 6.7, TSH 0.73  December 2021 hemoglobin A1c at 7.4, March 2022 hemoglobin A1c of 7.1, July 2022 hemoglobin A1c at 7.0.     February 2023 hemoglobin A1c at 7.6.  Potassium 4.4, creatinine 0.79, LDL was 101, HDL was 41, triglycerides 98 n May 2023, hemoglobin A1c is 7.8.    Interval history: Patient now on Jardiance 25 mg daily, metformin 2000 mg daily, Crestor 20 mg daily.  He actually reports hypotension.  November 2024 LDL 69.  November 2024 hemoglobin A1c at 6.9.  Diabetes monitoring and complications:  CAD: On Crestor 20 mg daily.  On Jardiance  25 mg daily.  November 2024 LDL 69.  Last eye exam results: October 2022 eye exam unremarkable  Microalbuminuria: Negative in November 2024  Neuropathy: No  HTN: No  On Statin: Yes  On Aspirin: Yes  Depression: No  Erectile dysfunction: No    #2 Hyperlipidemia with significant coronary artery calcification noted on CT scan in July 2024  Stress test in 2016 was unremarkable. December 2017 LDL was 44. At that time he was taking Lipitor 40 mg daily. August 2019 LDL was 78.  August 2021 LDL was 73. Feb 2023 LDL at 101.n March 2023, EGD showed stomach ulceration attributed to aspirin use.    Interval History  Now on Crestor  20 mg daily.  CT scan in July 2024 shows significant coronary artery calcification.    #3 Goiter - March 2023 US showed bilateral subcentimeter nodules  June 2016, he was noted to have a multinodular goiter with several nodules roughly 1 cm in size. October 2016 formal neck ultrasound showed multinodular goiter, with  largest nodules on the right side at 1.1 cm in size (two nodules) and the largest nodule on the left side at 1.2 cm in size.  The patient had declined ultrasound-guided FNA in October 2016. Eval with floor ultrasound in April 2017 showed the following: Right anterior nodule 1.0 x 0.6 x 0.6 cm in size, right posterior/inferior nodule at 1.0 x 0.5 x 1.0 cm in size, left inferior nodule at 0.6 x 0.5 x 0.7 cm in size. April 2018 US showed no interval change in thyroid nodules. Neck ultrasound performed in August 2020 continues to show small nodules bilaterally which are unchanged in size. Repeat neck ultrasound in April 2021 showed multiple nodules bilaterally, the largest about 1.1 cm in size-none met criteria for biopsy with no change compared with the previous exam in 2020. February 2023 TSH was 0.55,  Repeat neck ultrasound in March 2023 showed bilateral multiple subcentimeter nodules.      Interval History  Repeat thyroid ultrasound in March 2023 showed multiple subcentimeter nodules    #4 iron deficiency anemia - ulcer see on March 2023 EGD and colonscopy  Noted on blood draw in February 2023 with noted low iron at 50, Ferritin low at 8, hemoglobin 12.5.  Of note, the patient reports a history of colonoscopy in 2019 which showed some diverticulosis. March 2023 EGD and colonoscopy significant for stomach ulcer - h. Pylori negative-this was attributed to aspirin use.  The patient has now held his baby aspirin    Interval history: The patient reports that no evidence of source for his iron deficiency was identified.  However recent hemoglobin in November 2024 was normal.    #5 Word finding  "difficulty-possible mild cognitive impairment-observe for now  The patient's wife has noted patient has difficulty finding words to explain his thoughts.  The patient reports a history of cognitive decline in his mother when she was in her 90s.    Interval history: The patient has been seen at [facility] as well as the [facility].  Extensive evaluation completed.  General recommendation is observation at this time, with reassessment in 1 year.  Currently on Aricept.  October 2024 showed mild volume loss with minimal presumed chronic small vessel ischemic changes.    #6 status post left hip fracture status post fall  The patient is status post fall in October 2024.  This resulted in a left hip fracture for which he required \"pins\" to be placed.  DEXA scan done in December 2024 (preliminary read) actually shows normal bone density.  Evaluation for secondary causes of low bone density testosterone, vitamin D, serum calcium, serum protein electrophoresis, serum immunofixation, were all normal.  However TTG and 24-hour urine was not done.    #7 incidental left adrenal nodule roughly 2.8 cm noted on September 2024 CT scan  This was incidentally noted at 2.8 cm in size.    Review of Systems:   Pertinent items are noted in HPI.  All other systems are negative.    Active Medications:     Current Outpatient Medications:      acetaminophen (TYLENOL) 500 MG tablet, Take 1,000 mg by mouth., Disp: , Rfl:      apixaban ANTICOAGULANT (ELIQUIS) 2.5 MG tablet, Take 2.5 mg by mouth., Disp: , Rfl:      donepezil (ARICEPT) 10 MG tablet, Take one half tablet daily for 2 weeks. If symptoms have not improved, increase to 1 tablet daily., Disp: , Rfl:      empagliflozin (JARDIANCE) 25 MG TABS tablet, Take 1 tablet (25 mg) by mouth daily, Disp: 90 tablet, Rfl: 4     fish oil-omega-3 fatty acids 500 MG capsule, Take 500 mg by mouth daily, Disp: , Rfl:      magnesium oxide (MAG-OX) 400 MG tablet, Take 400 mg by mouth., Disp: , " "Rfl:      metFORMIN (GLUCOPHAGE) 1000 MG tablet, Take 1 tablet (1,000 mg) by mouth 2 times daily (with meals), Disp: 180 tablet, Rfl: 3     pantoprazole (PROTONIX) 40 MG EC tablet, Take 40 mg by mouth., Disp: , Rfl:      psyllium (REGULOID) 0.52 g capsule, Take 1 capsule by mouth daily., Disp: , Rfl:      rosuvastatin (CRESTOR) 10 MG tablet, Take 2 tablets (20 mg) by mouth daily, Disp: 180 tablet, Rfl: 3     senna-docusate (SENOKOT-S/PERICOLACE) 8.6-50 MG tablet, Take 1 tablet by mouth., Disp: , Rfl:     Current Facility-Administered Medications:      lidocaine 1 % injection 2 mL, 2 mL, INTRA-ARTICULAR, Once, Camila Baeza MD      Allergies:   Patient has no known allergies.      Past Medical History:  Type 2 diabetes mellitus  Goiter  Coronary arteriosclerosis  Hyperlipidemia  Obesity  Colon adenoma     Past Surgical History:  No past surgical history on file.    Family History:   Diabetes: Mother, father      Social History:   Social History     Tobacco Use     Smoking status: Former     Current packs/day: 0.00     Average packs/day: 1 pack/day for 32.2 years (32.2 ttl pk-yrs)     Types: Cigarettes, Cigars     Start date: 10/10/1978     Quit date: 2011     Years since quittin.9     Smokeless tobacco: Former   Substance Use Topics     Alcohol use: No     Drug use: No        Physical Exam:   GENERAL: Healthy, alert and no distress\",\"EYES: Eyes grossly normal to inspection.  No discharge or erythema, or obvious scleral/conjunctival abnormalities.\",\"RESP: No audible wheeze, cough, or visible cyanosis.  No visible retractions or increased work of breathing.  \",\"SKIN: Visible skin clear. No significant rash, abnormal pigmentation or lesions.\",\"NEURO: Cranial nerves grossly intact.  Mentation and speech appropriate for age, although the patient does seem to have difficulty noting the precise words to explain his thoughts..\",\"PSYCH: Mentation appears normal, affect normal/bright, judgement and insight " intact, normal speech and appearance well-groomed.    No visits with results within 1 Month(s) from this visit.   Latest known visit with results is:   Lab on 11/09/2024   Component Date Value Ref Range Status     25 OH Vitamin D2 11/09/2024 29  ug/L Final     25 OH Vitamin D3 11/09/2024 13  ug/L Final     25 OH Vit D Total 11/09/2024 42  20 - 75 ug/L Final    Season, race, dietary intake, and treatment affect the concentration of 25-hydroxy-Vitamin D. Values may decrease during winter months and increase during summer months. Values 20-29 ug/L may indicate Vitamin D insufficiency and values <20 ug/L may indicate Vitamin D deficiency.     Sodium 11/09/2024 139  135 - 145 mmol/L Final     Potassium 11/09/2024 4.3  3.4 - 5.3 mmol/L Final     Chloride 11/09/2024 102  98 - 107 mmol/L Final     Carbon Dioxide (CO2) 11/09/2024 27  22 - 29 mmol/L Final     Anion Gap 11/09/2024 10  7 - 15 mmol/L Final     Urea Nitrogen 11/09/2024 16.5  8.0 - 23.0 mg/dL Final     Creatinine 11/09/2024 0.74  0.67 - 1.17 mg/dL Final     GFR Estimate 11/09/2024 >90  >60 mL/min/1.73m2 Final    eGFR calculated using 2021 CKD-EPI equation.     Calcium 11/09/2024 10.4  8.8 - 10.4 mg/dL Final    Reference intervals for this test were updated on 7/16/2024 to reflect our healthy population more accurately. There may be differences in the flagging of prior results with similar values performed with this method. Those prior results can be interpreted in the context of the updated reference intervals.     Glucose 11/09/2024 158 (H)  70 - 99 mg/dL Final     Patient Fasting > 8hrs? 11/09/2024 Yes   Final     Estimated Average Glucose 11/09/2024 151 (H)  <117 mg/dL Final     Hemoglobin A1C 11/09/2024 6.9 (H)  <5.7 % Final    Normal <5.7%   Prediabetes 5.7-6.4%    Diabetes 6.5% or higher     Note: Adopted from ADA consensus guidelines.     WBC Count 11/09/2024 4.7  4.0 - 11.0 10e3/uL Final     RBC Count 11/09/2024 4.58  4.40 - 5.90 10e6/uL Final      Hemoglobin 11/09/2024 14.3  13.3 - 17.7 g/dL Final     Hematocrit 11/09/2024 41.0  40.0 - 53.0 % Final     MCV 11/09/2024 90  78 - 100 fL Final     MCH 11/09/2024 31.2  26.5 - 33.0 pg Final     MCHC 11/09/2024 34.9  31.5 - 36.5 g/dL Final     RDW 11/09/2024 11.9  10.0 - 15.0 % Final     Platelet Count 11/09/2024 198  150 - 450 10e3/uL Final     Immunofixation ELP 11/09/2024 No monoclonal protein seen on immunofixation. Pathologic significance requires clinical correlation.  Camila Dia M.D., Ph.D.   Final     Signout Location if Remote 11/09/2024 ABK1   Final     TSH 11/09/2024 0.82  0.30 - 4.20 uIU/mL Final     Cholesterol 11/09/2024 134  <200 mg/dL Final     Triglycerides 11/09/2024 88  <150 mg/dL Final     Direct Measure HDL 11/09/2024 47  >=40 mg/dL Final     LDL Cholesterol Calculated 11/09/2024 69  <100 mg/dL Final     Non HDL Cholesterol 11/09/2024 87  <130 mg/dL Final     Patient Fasting > 8hrs? 11/09/2024 Yes   Final     Creatinine Urine mg/dL 11/09/2024 64.1  mg/dL Final    The reference ranges have not been established in urine creatinine. The results should be integrated into the clinical context for interpretation.     Albumin Urine mg/L 11/09/2024 <12.0  mg/L Final    The reference ranges have not been established in urine albumin. The results should be integrated into the clinical context for interpretation.     Albumin Urine mg/g Cr 11/09/2024    Final    Unable to calculate, urine albumin and/or urine creatinine is outside detectable limits.  Microalbuminuria is defined as an albumin:creatinine ratio of 17 to 299 for males and 25 to 299 for females. A ratio of albumin:creatinine of 300 or higher is indicative of overt proteinuria.  Due to biologic variability, positive results should be confirmed by a second, first-morning random or 24-hour timed urine specimen. If there is discrepancy, a third specimen is recommended. When 2 out of 3 results are in the microalbuminuria range, this is evidence for  incipient nephropathy and warrants increased efforts at glucose control, blood pressure control, and institution of therapy with an angiotensin-converting-enzyme (ACE) inhibitor (if the patient can tolerate it).       Sex Hormone Binding Globulin 11/09/2024 42  11 - 80 nmol/L Final     Free Testosterone Calculated 11/09/2024 4.18  ng/dL Final    Male Aba Ranges:  Aba Stage I: Less than or equal to 0.37 ng/dL  Aba Stage II: 0.03-2.1 ng/dL  Aba Stage III: 0.10-9.8 ng/dL  Aba Stage IV: 3.5-16.9 ng/dL  Aba Stage V: 4.1-23.9 ng/dL     Testosterone Total 11/09/2024 251  240 - 950 ng/dL Final     Total Protein Serum for ELP 11/09/2024 7.3  6.4 - 8.3 g/dL Final     Albumin 11/09/2024 4.6  3.7 - 5.1 g/dL Final     Alpha 1 11/09/2024 0.3  0.2 - 0.4 g/dL Final     Alpha 2 11/09/2024 0.7  0.5 - 0.9 g/dL Final     Beta Globulin 11/09/2024 0.8  0.6 - 1.0 g/dL Final     Gamma Globulin 11/09/2024 0.9  0.7 - 1.6 g/dL Final     Monoclonal Peak 11/09/2024 0.0  <=0.0 g/dL Final     ELP Interpretation 11/09/2024 Essentially normal electrophoretic pattern. No obvious monoclonal proteins seen. Pathologic significance requires clinical correlation. Camila Dia M.D., Ph.D.   Final     Signout Location if Remote 11/09/2024 ABK1   Final         Assessment and Plan:  #1 Diabetes mellitus type 2  Extensive discussion with patient.  Continue with metformin 1000 mg twice daily and Jardiance 25 mg daily.  I think his recent elevation of his hemoglobin A1c was due to his recent fall, fracture, and subsequent immobility.  I do think he is on a good program at this time.    I did talk with the patient the possibility of a CGM.  I am not sure if this is covered by insurance.  We will ask.  If this is not covered by his insurance, fredylo could be an option for him.  I think this will help manage his dietary intake and glycemic measures.  Continue with dietary lifestyle changes.    #2 Hyperlipidemia with noted severe coronary artery  calcification on July 2024 CT scan  Doing well on Crestor 20 mg daily.  He has seen cardiology.    #3 Goiter - March 2023 US showed bilateral subcentimeter nodules  Repeat thyroid ultrasound in March 2023 showed multiple subcentimeter nodules will consider repeating in 2026.    #4 iron deficiency anemia - ulcer (h pylori negative)seen on March 2023 EGD and colonscopy  Not discussed today.    #5 Word finding difficulty-possible mild cognitive impairment-observe for now  Currently on Aricept.    #6 status post left hip fracture status post fall  Official DEXA report is negative.  However the bone density is normal with both T and Z-scores within the normal range.  Although we have not yet done a TTG or 24-hour urine, given his normal DEXA scan, I do not think we necessarily need to do this at this time.  That being said, we will have the patient take Fosamax 70 mg weekly starting in January 2025 (when he returns) and if he tolerates this program, we can have a conversation of whether we should continue with the Fosamax or perhaps change over to Reclast.    #7 incidental left adrenal nodule roughly 2.8 cm noted on September 2024 CT scan  Patient will need repeat CT scan in 2025.  However we will have patient do labs when he returns from his trip (roughly January 2025) for overnight dexamethasone suppression test, and adrenal labs as noted below.  However he clinically does not have any evidence for adrenal over secretion.    Will plan for return visit in 6 months and again in 1 year.  Labs below in January 2025.    Orders Placed This Encounter   Procedures     Renin activity     Aldosterone     DHEA sulfate     Metanephrines Plasma Free     Cortisol          Again, thank you for allowing me to participate in the care of your patient.      Sincerely,    Cheri Watson MD

## 2024-12-11 ENCOUNTER — TELEPHONE (OUTPATIENT)
Dept: ENDOCRINOLOGY | Facility: CLINIC | Age: 64
End: 2024-12-11

## 2024-12-11 NOTE — TELEPHONE ENCOUNTER
PA Initiation    Medication: DEXCOM G7 SENSOR MISC  Insurance Company: [employer] - Phone [phone] Fax [phone]  Pharmacy Filling the Rx:    Filling Pharmacy Phone:    Filling Pharmacy Fax:    Start Date: 12/11/2024     Key [id number]

## 2024-12-11 NOTE — TELEPHONE ENCOUNTER
PA Initiation    Medication: FREESTYLE MAXIM 3 PLUS SENSOR MISC  Insurance Company: [employer] - Phone [phone] Fax [phone]  Pharmacy Filling the Rx:    Filling Pharmacy Phone:    Filling Pharmacy Fax:    Start Date: 12/11/2024     Key [id number]

## 2024-12-12 NOTE — TELEPHONE ENCOUNTER
PRIOR AUTHORIZATION DENIED    Medication: DEXCOM G7 SENSOR MISC  Insurance Company: [place] [employer] - Phone [phone] Fax [phone]  Denial Date: 12/11/2024  Denial Reason(s):     Appeal Information:       Patient Notified: no

## 2024-12-12 NOTE — TELEPHONE ENCOUNTER
PRIOR AUTHORIZATION DENIED    Medication: FREESTYLE MAXIM 3 PLUS SENSOR MISC  Insurance Company: [employer] - Phone [phone] Fax [phone]  Denial Date: 12/11/2024  Denial Reason(s):     Appeal Information:       Patient Notified: no

## 2024-12-16 ENCOUNTER — TELEPHONE (OUTPATIENT)
Dept: ENDOCRINOLOGY | Facility: CLINIC | Age: 64
End: 2024-12-16
Payer: COMMERCIAL

## 2024-12-16 NOTE — PROGRESS NOTES
Dear Chema    As discussed, your bone density is normal.  However the fact that you fracture, means that you have osteoporosis even in the setting of normal bone density.  I still think you would benefit from the alendronate as per our discussion.    If you have any questions, please feel free to contact my nurse at [phone] select option #3 for triage nurse  or  option #1 for scheduling related questions.    Regards    Cheri Watson MD

## 2024-12-30 DIAGNOSIS — S72.009S CLOSED FRACTURE OF HIP, UNSPECIFIED LATERALITY, SEQUELA: ICD-10-CM

## 2025-01-04 RX ORDER — ALENDRONATE SODIUM 70 MG/1
70 TABLET ORAL
Qty: 12 TABLET | Refills: 1 | OUTPATIENT
Start: 2025-01-04

## 2025-01-20 ENCOUNTER — LAB (OUTPATIENT)
Dept: LAB | Facility: CLINIC | Age: 65
End: 2025-01-20
Payer: COMMERCIAL

## 2025-01-20 DIAGNOSIS — E27.9 ADRENAL NODULE: ICD-10-CM

## 2025-01-20 DIAGNOSIS — S72.009S CLOSED FRACTURE OF HIP, UNSPECIFIED LATERALITY, SEQUELA: ICD-10-CM

## 2025-01-20 LAB — CORTIS SERPL-MCNC: 9.9 UG/DL

## 2025-01-20 PROCEDURE — 84244 ASSAY OF RENIN: CPT | Mod: 90 | Performed by: PATHOLOGY

## 2025-01-20 PROCEDURE — 36415 COLL VENOUS BLD VENIPUNCTURE: CPT | Performed by: PATHOLOGY

## 2025-01-20 PROCEDURE — 99000 SPECIMEN HANDLING OFFICE-LAB: CPT | Performed by: PATHOLOGY

## 2025-01-20 PROCEDURE — 82627 DEHYDROEPIANDROSTERONE: CPT | Performed by: INTERNAL MEDICINE

## 2025-01-20 PROCEDURE — 82533 TOTAL CORTISOL: CPT | Performed by: INTERNAL MEDICINE

## 2025-01-20 PROCEDURE — 82088 ASSAY OF ALDOSTERONE: CPT | Performed by: INTERNAL MEDICINE

## 2025-01-20 PROCEDURE — 83835 ASSAY OF METANEPHRINES: CPT | Mod: 90 | Performed by: PATHOLOGY

## 2025-01-21 LAB
ALDOST SERPL-MCNC: 6.9 NG/DL (ref 0–31)
DHEA-S SERPL-MCNC: 87 UG/DL (ref 80–560)

## 2025-01-23 LAB
ANNOTATION COMMENT IMP: NORMAL
METANEPHS SERPL-SCNC: 0.13 NMOL/L
NORMETANEPHRINE SERPL-SCNC: 0.29 NMOL/L

## 2025-01-25 LAB — RENIN PLAS-CCNC: 2.3 NG/ML/HR

## 2025-01-27 ENCOUNTER — TELEPHONE (OUTPATIENT)
Dept: ENDOCRINOLOGY | Facility: CLINIC | Age: 65
End: 2025-01-27
Payer: COMMERCIAL

## 2025-01-28 ENCOUNTER — TELEPHONE (OUTPATIENT)
Dept: ENDOCRINOLOGY | Facility: CLINIC | Age: 65
End: 2025-01-28
Payer: COMMERCIAL

## 2025-01-28 NOTE — TELEPHONE ENCOUNTER
Will need to contact pt  -  Dear Chema    Here are your labs which look pretty normal.  The main question is whether you took dexamethasone at 11 PM the night before the lab draw.  This will help me interpret the cortisol level.    If you have any questions, please feel free to contact my nurse at [phone] select option #3 for triage nurse  or  option #1 for scheduling related questions.    Regards    Cheri Watson MD     No visits with results within 1 Week(s) from this visit.   Latest known visit with results is:   Lab on 2025   Component Date Value Ref Range Status    Renin Activity 2025 2.3  ng/mL/hr Final    Comment: INTERPRETIVE INFORMATION: Renin Activity    Adult, Normal sodium diet:    Supine ................. 0.2-1.6 ng/mL/hr    Upright ................ 0.5-4.0 ng/mL/hr    Children, Normal sodium diet, Supine:    [place] (1-7 days) ..... 2.0-35.0 ng/mL/hr    Cord blood ............. 4.0-32.0 ng/mL/hr    1-12 mos ............... 2.4-37.0 ng/mL/hr    13 mos-3 yrs ........... 1.7-11.2 ng/mL/hr    4-5 yrs ................ 1.0- 6.5 ng/mL/hr    6-10 yrs ............... 0.5- 5.9 ng/mL/hr    11-15 yrs .............. 0.5- 3.3 ng/mL/hr    Children, normal sodium diet, Upright:    0-3 yrs ................ Not Available    4-5 yrs ................ Less than or equal to 15 ng/mL/hr    6-10 yrs ............... Less than or equal to 17 ng/mL/hr    11-15 yrs .............. Less than or equal to 16 ng/mL/hr    Plasma renin activity measures enzyme ability to convert   angiotensinogen to angiotensin I and is limited by the   availability of angiotensinogen. Plasma renin activity is   not an accurate indicator                            of enzyme activity when   angiotensinogen is decreased.    This test was developed and its performance characteristics   determined by [email or web address]. It has not been cleared or   approved by the US Food and Drug Administration. This test   was performed in a CLIA certified  laboratory and is   intended for clinical purposes.  Performed By: [facility]  [address] [place], [place] [id number]  : Philip Lebron MD, PhD  CLIA Number: [id number]    Aldosterone 01/20/2025 6.9  0.0 - 31.0 ng/dL Final    For screening of primary aldosteronism a positive test result is defined by most experts as: Plasma renin activity (PRA) <1.0 ng/mL/hour AND plasma aldosterone concentration (PAC) >=10 ng/dL OR aldosterone to renin ratio >=20 with PAC >=10 ng/dL    DHEA Sulfate 01/20/2025 87  80 - 560 ug/dL Final    Metanephrine 01/20/2025 0.13  0.00 - 0.49 nmol/L Final    Normetanephrine 01/20/2025 0.29  0.00 - 0.89 nmol/L Final    Metanephrines Interpretation 01/20/2025 See Note   Final    Comment: INTERPRETIVE INFORMATION: Metanephrines, Plasma (Free)    This test is useful in the detection of pheochromocytoma, a   rare neuroendocrine tumor. The majority of patients with   pheochromocytoma have a plasma normetanephrine   concentration in excess of 2.2 nmol/L and/or a metanephrine   concentration in excess of 1.1 nmol/L. Increased   concentrations of these analytes serve as confirmation for   diagnosis. Patients with essential hypertension and plasma   concentrations of normetanephrine below 0.9 nmol/L and a   metanephrine concentration below 0.5 nmol/L, can be   excluded from further testing. If clinical suspicion   remains, repeat testing or testing for metanephrines in a   24-hr. urine specimen should be considered.    This test was developed and its performance characteristics   determined by [facility]. It has not been cleared or   approved by the US Food and Drug Administration. This test   was performed in a CLIA certified laboratory and is   intended for clinical                            purposes.  Performed By: [facility]  [address] [place], [place] [id number]  : Philip Lebron MD, PhD  CLIA Number: [id number]    Cortisol  01/20/2025 9.9    ug/dL Final    6 months and older:  6 to 10 AM Cortisol Reference Range:  4-22 ug/dL   4 to 8 PM Cortisol Reference Range:  3-17 ug/dL

## 2025-02-06 DIAGNOSIS — H35.362 MACULAR DRUSEN, LEFT: Primary | ICD-10-CM

## 2025-03-05 DIAGNOSIS — H35.362 MACULAR DRUSEN, LEFT: Primary | ICD-10-CM

## 2025-03-12 ENCOUNTER — OFFICE VISIT (OUTPATIENT)
Dept: OPHTHALMOLOGY | Facility: CLINIC | Age: 65
End: 2025-03-12
Attending: OPHTHALMOLOGY
Payer: COMMERCIAL

## 2025-03-12 DIAGNOSIS — E11.9 DM TYPE 2 WITHOUT RETINOPATHY (H): ICD-10-CM

## 2025-03-12 DIAGNOSIS — H43.813 PVD (POSTERIOR VITREOUS DETACHMENT), BILATERAL: ICD-10-CM

## 2025-03-12 DIAGNOSIS — H25.13 NUCLEAR SENILE CATARACT OF BOTH EYES: ICD-10-CM

## 2025-03-12 DIAGNOSIS — H35.362 MACULAR DRUSEN, LEFT: ICD-10-CM

## 2025-03-12 DIAGNOSIS — H35.371 EPIRETINAL MEMBRANE (ERM) OF RIGHT EYE: Primary | ICD-10-CM

## 2025-03-12 PROCEDURE — 99213 OFFICE O/P EST LOW 20 MIN: CPT | Performed by: OPHTHALMOLOGY

## 2025-03-12 PROCEDURE — 92134 CPTRZ OPH DX IMG PST SGM RTA: CPT | Performed by: OPHTHALMOLOGY

## 2025-03-12 PROCEDURE — 92250 FUNDUS PHOTOGRAPHY W/I&R: CPT | Performed by: OPHTHALMOLOGY

## 2025-03-12 ASSESSMENT — REFRACTION_WEARINGRX
OD_CYLINDER: +1.00
OS_SPHERE: -8.50
OD_ADD: +2.75
OS_CYLINDER: +1.50
OD_SPHERE: -4.25
OD_AXIS: 105
OS_ADD: +2.75
OS_AXIS: 058

## 2025-03-12 ASSESSMENT — SLIT LAMP EXAM - LIDS
COMMENTS: DERMATOCHALASIS UPPER LID
COMMENTS: DERMATOCHALASIS UPPER LID

## 2025-03-12 ASSESSMENT — VISUAL ACUITY
CORRECTION_TYPE: GLASSES
OS_CC+: -1
METHOD: SNELLEN - LINEAR
OD_CC: 20/30
OS_CC: 20/25
OD_CC+: -2

## 2025-03-12 ASSESSMENT — TONOMETRY
OS_IOP_MMHG: 17
OD_IOP_MMHG: 19
OD_IOP_MMHG: 23
IOP_METHOD: TONOPEN
IOP_METHOD: TONOPEN

## 2025-03-12 ASSESSMENT — EXTERNAL EXAM - RIGHT EYE: OD_EXAM: NORMAL

## 2025-03-12 ASSESSMENT — CUP TO DISC RATIO
OD_RATIO: 0.4
OS_RATIO: 0.3

## 2025-03-12 ASSESSMENT — EXTERNAL EXAM - LEFT EYE: OS_EXAM: NORMAL

## 2025-03-12 NOTE — NURSING NOTE
Chief Complaints and History of Present Illnesses   Patient presents with    Retinal Evaluation     Chief Complaint(s) and History of Present Illness(es)       Retinal Evaluation               Comments    Patient states no noticeable changes in vision. No pain BE. No dryness or irritation. No flashes of light or floaters.    Ocular Meds: None    DM2   Lab Results       Component                Value               Date                       A1C                      6.9                 11/09/2024                 A1C                      7.6                 09/26/2023                 A1C                      7.8                 05/22/2023                 A1C                      7.0                 07/18/2022                 A1C                      7.1                 03/02/2022                 A1C                      6.8                 07/07/2020                 A1C                      8.3                 06/07/2013                 A1C                      7.5                 12/23/2011                Angelita Boss OA 12:33 PM March 12, 2025

## 2025-03-12 NOTE — PROGRESS NOTES
CC -   Diabetic follow-up, referred    INTERVAL HISTORY - Initial visit    PMH -   Chema Mccullough is a  65 year old year-old patient with history of diabetes (Type 2 - 14 years). Last A1c 6.9% 11/2024. Currently taking metformin and jardiance. Doing well. No flashes or floaters.       PAST OCULAR SURGERY  none    RETINAL IMAGING:  OCT 10/24/22   OD - normal, PHF attached, thin choroid; PHF detached; new ERM with blunting of foveal depression  OS - normal, PHF attached, thin choroid      ASSESSMENT & PLAN    # ERM right eye  # PVD right eye  New since last visit in 2023; mild distortion of foveal anatomy but not symptomatic  Discussed the nature of the condition and tx options  Recommended to come back in 6-9 months but wants to wait until 1 year follow-up      # diabetes without retinopathy  - recommend A1c < 7.2%  - Continue good BS control    #  cataracts each eye   - mild and not significant: monitor    #  dermatochalasis each eye   - monitor     # high myopia  - monitor  - discussed risk of RD/RT     return to clinic: 9-12 months for VTD [employer] OCT        Complete documentation of historical and exam elements from today's encounter can be found in the full encounter summary report (not reduplicated in this progress note). I personally obtained the chief complaint(s) and history of present illness.  I confirmed and edited as necessary the review of systems, past medical/surgical history, family history, social history, and examination findings as documented by others; and I examined the patient myself. I personally reviewed the relevant tests, images, and reports as documented above. I formulated and edited as necessary the assessment and plan and discussed the findings and management plan with the patient and family.    Juan M Guillermo MD, PhD

## 2025-03-26 ENCOUNTER — TELEPHONE (OUTPATIENT)
Dept: ENDOCRINOLOGY | Facility: CLINIC | Age: 65
End: 2025-03-26
Payer: COMMERCIAL

## 2025-04-19 ENCOUNTER — LAB (OUTPATIENT)
Dept: LAB | Facility: CLINIC | Age: 65
End: 2025-04-19
Payer: COMMERCIAL

## 2025-04-19 DIAGNOSIS — E27.9 ADRENAL NODULE: ICD-10-CM

## 2025-04-19 DIAGNOSIS — R47.89 WORD FINDING DIFFICULTY: ICD-10-CM

## 2025-04-19 LAB
CORTIS SERPL-MCNC: 1 UG/DL
FOLATE SERPL-MCNC: 28 NG/ML (ref 4.6–34.8)
VIT B12 SERPL-MCNC: 683 PG/ML (ref 232–1245)

## 2025-04-19 PROCEDURE — 99000 SPECIMEN HANDLING OFFICE-LAB: CPT | Performed by: PATHOLOGY

## 2025-04-19 PROCEDURE — 82607 VITAMIN B-12: CPT | Performed by: INTERNAL MEDICINE

## 2025-04-19 PROCEDURE — 82746 ASSAY OF FOLIC ACID SERUM: CPT | Performed by: INTERNAL MEDICINE

## 2025-04-19 PROCEDURE — 82533 TOTAL CORTISOL: CPT | Performed by: INTERNAL MEDICINE

## 2025-04-19 PROCEDURE — 36415 COLL VENOUS BLD VENIPUNCTURE: CPT | Performed by: PATHOLOGY

## 2025-04-22 ENCOUNTER — TELEPHONE (OUTPATIENT)
Dept: ENDOCRINOLOGY | Facility: CLINIC | Age: 65
End: 2025-04-22
Payer: COMMERCIAL

## 2025-04-22 NOTE — TELEPHONE ENCOUNTER
Labs noted below  - called pt - labs reviewed    -  April 22, 2025      Chema Mccullough  [address] [place] [address]        Dear Chema    Here are your labs which from my standpoint, look really good.  I measured the B12 and folate, which were normal, to see if this might be affecting your memory.  These labs were normal.  You did suppress the cortisol.  This is normal.  In short, everything looks normal.    If you have any questions, please feel free to contact my nurse at [phone] select option #3 for triage nurse  or  option #1 for scheduling related questions.    Regards    Cheri Watson MD     Resulted Orders   Cortisol   Result Value Ref Range    Cortisol 1.0   ug/dL      Comment:      6 to 10 AM Cortisol Reference Range:  4-22 ug/dL   4 to 8 PM Cortisol Reference Range:  3-17 ug/dL   Folate   Result Value Ref Range    Folic Acid 28.0 4.6 - 34.8 ng/mL   Vitamin B12   Result Value Ref Range    Vitamin B12 683 232 - 1,245 pg/mL

## 2025-05-10 ENCOUNTER — HEALTH MAINTENANCE LETTER (OUTPATIENT)
Age: 65
End: 2025-05-10

## 2025-07-12 ENCOUNTER — HEALTH MAINTENANCE LETTER (OUTPATIENT)
Age: 65
End: 2025-07-12

## (undated) RX ORDER — FENTANYL CITRATE 50 UG/ML
INJECTION, SOLUTION INTRAMUSCULAR; INTRAVENOUS
Status: DISPENSED
Start: 2023-05-03

## (undated) RX ORDER — METHYLPREDNISOLONE ACETATE 40 MG/ML
INJECTION, SUSPENSION INTRA-ARTICULAR; INTRALESIONAL; INTRAMUSCULAR; SOFT TISSUE
Status: DISPENSED
Start: 2017-05-09

## (undated) RX ORDER — SIMETHICONE 40MG/0.6ML
SUSPENSION, DROPS(FINAL DOSAGE FORM)(ML) ORAL
Status: DISPENSED
Start: 2023-03-08

## (undated) RX ORDER — FENTANYL CITRATE 50 UG/ML
INJECTION, SOLUTION INTRAMUSCULAR; INTRAVENOUS
Status: DISPENSED
Start: 2023-03-08

## (undated) RX ORDER — SIMETHICONE 40MG/0.6ML
SUSPENSION, DROPS(FINAL DOSAGE FORM)(ML) ORAL
Status: DISPENSED
Start: 2023-05-15

## (undated) RX ORDER — SIMETHICONE 40MG/0.6ML
SUSPENSION, DROPS(FINAL DOSAGE FORM)(ML) ORAL
Status: DISPENSED
Start: 2023-03-13

## (undated) RX ORDER — LIDOCAINE HYDROCHLORIDE 10 MG/ML
INJECTION, SOLUTION INFILTRATION; PERINEURAL
Status: DISPENSED
Start: 2017-05-09